# Patient Record
Sex: FEMALE | Race: BLACK OR AFRICAN AMERICAN | NOT HISPANIC OR LATINO | Employment: OTHER | URBAN - METROPOLITAN AREA
[De-identification: names, ages, dates, MRNs, and addresses within clinical notes are randomized per-mention and may not be internally consistent; named-entity substitution may affect disease eponyms.]

---

## 2017-02-07 ENCOUNTER — APPOINTMENT (EMERGENCY)
Dept: RADIOLOGY | Facility: HOSPITAL | Age: 22
End: 2017-02-07
Payer: COMMERCIAL

## 2017-02-07 ENCOUNTER — HOSPITAL ENCOUNTER (EMERGENCY)
Facility: HOSPITAL | Age: 22
Discharge: HOME | End: 2017-02-07
Admitting: EMERGENCY MEDICINE
Payer: COMMERCIAL

## 2017-02-07 VITALS
OXYGEN SATURATION: 98 % | BODY MASS INDEX: 24.11 KG/M2 | HEIGHT: 66 IN | HEART RATE: 96 BPM | WEIGHT: 150 LBS | SYSTOLIC BLOOD PRESSURE: 120 MMHG | RESPIRATION RATE: 18 BRPM | DIASTOLIC BLOOD PRESSURE: 64 MMHG | TEMPERATURE: 98 F

## 2017-02-07 DIAGNOSIS — S63.612A SPRAIN OF RIGHT MIDDLE FINGER, INITIAL ENCOUNTER: ICD-10-CM

## 2017-02-07 DIAGNOSIS — S09.90XA MINOR HEAD INJURY WITHOUT LOSS OF CONSCIOUSNESS, INITIAL ENCOUNTER: ICD-10-CM

## 2017-02-07 DIAGNOSIS — S00.93XA CONTUSION OF HEAD: Primary | ICD-10-CM

## 2017-02-07 LAB — HCG UR QL: NEGATIVE

## 2017-02-07 PROCEDURE — 70486 CT MAXILLOFACIAL W/O DYE: CPT

## 2017-02-07 PROCEDURE — 70450 CT HEAD/BRAIN W/O DYE: CPT

## 2017-02-07 PROCEDURE — 81025 URINE PREGNANCY TEST: CPT | Performed by: PHYSICIAN ASSISTANT

## 2017-02-07 PROCEDURE — 73140 X-RAY EXAM OF FINGER(S): CPT

## 2017-02-07 PROCEDURE — 99284 EMERGENCY DEPT VISIT MOD MDM: CPT

## 2017-02-07 RX ORDER — ACETAMINOPHEN 325 MG/1
325 TABLET ORAL EVERY 6 HOURS PRN
Qty: 30 TABLET | Refills: 0 | Status: SHIPPED | OUTPATIENT
Start: 2017-02-07 | End: 2017-03-09

## 2018-08-10 ENCOUNTER — OFFICE VISIT (OUTPATIENT)
Dept: FAMILY MEDICINE CLINIC | Facility: CLINIC | Age: 23
End: 2018-08-10
Payer: COMMERCIAL

## 2018-08-10 ENCOUNTER — APPOINTMENT (OUTPATIENT)
Dept: LAB | Facility: HOSPITAL | Age: 23
End: 2018-08-10
Payer: COMMERCIAL

## 2018-08-10 ENCOUNTER — TRANSCRIBE ORDERS (OUTPATIENT)
Dept: ADMINISTRATIVE | Facility: HOSPITAL | Age: 23
End: 2018-08-10

## 2018-08-10 VITALS
WEIGHT: 170 LBS | BODY MASS INDEX: 27.32 KG/M2 | HEART RATE: 70 BPM | SYSTOLIC BLOOD PRESSURE: 110 MMHG | RESPIRATION RATE: 16 BRPM | HEIGHT: 66 IN | TEMPERATURE: 98 F | DIASTOLIC BLOOD PRESSURE: 68 MMHG | OXYGEN SATURATION: 99 %

## 2018-08-10 DIAGNOSIS — R55 SYNCOPE AND COLLAPSE: ICD-10-CM

## 2018-08-10 DIAGNOSIS — Z09 ENCOUNTER FOR EXAMINATION FOLLOWING TREATMENT AT HOSPITAL: Primary | ICD-10-CM

## 2018-08-10 LAB
ALBUMIN SERPL BCP-MCNC: 4.4 G/DL (ref 3–5.2)
ALP SERPL-CCNC: 48 U/L (ref 43–122)
ALT SERPL W P-5'-P-CCNC: 20 U/L (ref 9–52)
ANION GAP SERPL CALCULATED.3IONS-SCNC: 11 MMOL/L (ref 5–14)
AST SERPL W P-5'-P-CCNC: 22 U/L (ref 14–36)
BASOPHILS # BLD AUTO: 0 THOUSANDS/ΜL (ref 0–0.1)
BASOPHILS NFR BLD AUTO: 0 % (ref 0–1)
BILIRUB SERPL-MCNC: 0.4 MG/DL
BILIRUB UR QL STRIP: NEGATIVE
BUN SERPL-MCNC: 12 MG/DL (ref 5–25)
CALCIUM SERPL-MCNC: 9.8 MG/DL (ref 8.4–10.2)
CHLORIDE SERPL-SCNC: 104 MMOL/L (ref 97–108)
CLARITY UR: CLEAR
CO2 SERPL-SCNC: 25 MMOL/L (ref 22–30)
COLOR UR: YELLOW
CREAT SERPL-MCNC: 0.76 MG/DL (ref 0.6–1.2)
EOSINOPHIL # BLD AUTO: 0.1 THOUSAND/ΜL (ref 0–0.4)
EOSINOPHIL NFR BLD AUTO: 1 % (ref 0–6)
ERYTHROCYTE [DISTWIDTH] IN BLOOD BY AUTOMATED COUNT: 13.4 %
GFR SERPL CREATININE-BSD FRML MDRD: 126 ML/MIN/1.73SQ M
GLUCOSE P FAST SERPL-MCNC: 78 MG/DL (ref 70–99)
GLUCOSE UR STRIP-MCNC: NEGATIVE MG/DL
HCT VFR BLD AUTO: 41.8 % (ref 36–46)
HGB BLD-MCNC: 13.8 G/DL (ref 12–16)
HGB UR QL STRIP.AUTO: NEGATIVE
KETONES UR STRIP-MCNC: NEGATIVE MG/DL
LEUKOCYTE ESTERASE UR QL STRIP: NEGATIVE
LYMPHOCYTES # BLD AUTO: 1.9 THOUSANDS/ΜL (ref 0.5–4)
LYMPHOCYTES NFR BLD AUTO: 37 % (ref 20–50)
MAGNESIUM SERPL-MCNC: 2.2 MG/DL (ref 1.6–2.3)
MCH RBC QN AUTO: 31 PG (ref 26–34)
MCHC RBC AUTO-ENTMCNC: 33 G/DL (ref 31–36)
MCV RBC AUTO: 94 FL (ref 80–100)
MONOCYTES # BLD AUTO: 0.4 THOUSAND/ΜL (ref 0.2–0.9)
MONOCYTES NFR BLD AUTO: 8 % (ref 1–10)
NEUTROPHILS # BLD AUTO: 2.7 THOUSANDS/ΜL (ref 1.8–7.8)
NEUTS SEG NFR BLD AUTO: 53 % (ref 45–65)
NITRITE UR QL STRIP: NEGATIVE
PH UR STRIP.AUTO: 8 [PH] (ref 4.5–8)
PLATELET # BLD AUTO: 221 THOUSANDS/UL (ref 150–450)
PMV BLD AUTO: 10.2 FL (ref 8.9–12.7)
POTASSIUM SERPL-SCNC: 4 MMOL/L (ref 3.6–5)
PROT SERPL-MCNC: 8.7 G/DL (ref 5.9–8.4)
PROT UR STRIP-MCNC: NEGATIVE MG/DL
RBC # BLD AUTO: 4.45 MILLION/UL (ref 4–5.2)
SL AMB  POCT GLUCOSE, UA: NORMAL
SL AMB LEUKOCYTE ESTERASE,UA: NEGATIVE
SL AMB POCT BILIRUBIN,UA: NEGATIVE
SL AMB POCT BLOOD,UA: NORMAL
SL AMB POCT CLARITY,UA: CLEAR
SL AMB POCT COLOR,UA: YELLOW
SL AMB POCT KETONES,UA: NEGATIVE
SL AMB POCT NITRITE,UA: NEGATIVE
SL AMB POCT PH,UA: 8
SL AMB POCT SPECIFIC GRAVITY,UA: 1
SL AMB POCT URINE PROTEIN: NEGATIVE
SL AMB POCT UROBILINOGEN: NORMAL
SODIUM SERPL-SCNC: 140 MMOL/L (ref 137–147)
SP GR UR STRIP.AUTO: 1.02 (ref 1–1.03)
UROBILINOGEN UR QL STRIP.AUTO: 1 E.U./DL
WBC # BLD AUTO: 5.1 THOUSAND/UL (ref 4.5–11)

## 2018-08-10 PROCEDURE — 3725F SCREEN DEPRESSION PERFORMED: CPT | Performed by: FAMILY MEDICINE

## 2018-08-10 PROCEDURE — 80053 COMPREHEN METABOLIC PANEL: CPT

## 2018-08-10 PROCEDURE — 36415 COLL VENOUS BLD VENIPUNCTURE: CPT

## 2018-08-10 PROCEDURE — 99204 OFFICE O/P NEW MOD 45 MIN: CPT | Performed by: FAMILY MEDICINE

## 2018-08-10 PROCEDURE — 83735 ASSAY OF MAGNESIUM: CPT

## 2018-08-10 PROCEDURE — 93000 ELECTROCARDIOGRAM COMPLETE: CPT | Performed by: FAMILY MEDICINE

## 2018-08-10 PROCEDURE — 81002 URINALYSIS NONAUTO W/O SCOPE: CPT | Performed by: FAMILY MEDICINE

## 2018-08-10 PROCEDURE — 85025 COMPLETE CBC W/AUTO DIFF WBC: CPT

## 2018-08-10 PROCEDURE — 81003 URINALYSIS AUTO W/O SCOPE: CPT | Performed by: STUDENT IN AN ORGANIZED HEALTH CARE EDUCATION/TRAINING PROGRAM

## 2018-08-10 RX ORDER — DESOGESTREL AND ETHINYL ESTRADIOL 0.15-0.03
KIT ORAL EVERY 24 HOURS
COMMUNITY
End: 2019-06-20

## 2018-08-10 NOTE — PATIENT INSTRUCTIONS
You are not allowed to drive at this risk few others  The DMV will be contacted  No using heavy machinery  Syncope   AMBULATORY CARE:   Syncope  is also called fainting or passing out  Syncope is a sudden, temporary loss of consciousness, followed by a fall from a standing or sitting position  Syncope ranges from not serious to a sign of a more serious condition that needs to be treated  You can control some health conditions that cause syncope  Your healthcare providers can help you create a plan to manage syncope and prevent episodes  Signs and symptoms that may occur before syncope include the following:   · Cold, clammy, and sweaty skin     · Fast breathing and a racing, pounding heartbeat     · Feeling more tired than usual     · Nausea, a warm feeling, and sweating    · A headache, or feeling lightheaded or dizzy    · Tingling sensation or numbness     · Spots in front of your eyes, blurred vision, or double vision  Seek care immediately if:   · You are bleeding because you hit your head when you fainted  · You suddenly have double vision, difficulty speaking, numbness, and cannot move your arms or legs  · You have chest pain and trouble breathing  · You vomit blood or material that looks like coffee grounds  · You see blood in your bowel movement  Contact your healthcare provider if:   · You have new or worsening symptoms  · You have another syncope episode  · You have a headache, fast heartbeat, or feel too dizzy to stand up  · You have questions or concerns about your condition or care  Treatment for syncope  depends on the cause of your syncope  To prevent syncope from happening again, you may  need any of the following:  · Medicines  may be needed to treat any medical conditions that are causing your syncope  These may include medicines to help your heart pump strongly and regularly  Your healthcare provider may also make changes to any medicines that are causing syncope  · Tilt training  involves training yourself to stand for 10 to 30 minutes each day against a wall  This helps your body decrease the effects of posture changes and reduces the number of fainting spells  Manage syncope:   · Keep a record of your syncope episodes  Include your symptoms and your activity before and after the episode  The record can help your healthcare provider find the cause of your syncope and help you manage episodes  · Sit or lie down when needed  This includes when you feel dizzy, your throat is getting tight, and your vision changes  Raise your legs above the level of your heart  · Take slow, deep breaths if you start to breathe faster with anxiety or fear  This can help decrease dizziness and the feeling that you might faint  · Check your blood pressure often  This is important if you take medicine to lower your blood pressure  Check your blood pressure when you are lying down and when you are standing  Ask how often to check during the day  Keep a record of your blood pressure numbers  Your healthcare provider may use the record to help plan your treatment  Prevent a syncope episode:   · Move slowly and let yourself get used to one position before you move to another position  This is very important when you change from a lying or sitting position to a standing position  Take some deep breaths before you stand up from a lying position  Stand up slowly  Sudden movements may cause a fainting spell  Sit on the side of the bed or couch for a few minutes before you stand up  If you are on bedrest, try to be upright for about 2 hours each day, or as directed  Do not lock your legs if you are standing for a long period of time  Move your legs and bend your knees to keep blood flowing  · Follow your healthcare provider's recommendations  Your provider may  recommend that you drink more liquids to prevent dehydration   You may also need to have more salt to keep your blood pressure from dropping too low and causing syncope  Your provider will tell you how much liquid and sodium to have each day  · Watch for signs of low blood sugar  These include hunger, nervousness, sweating, and fast or fluttery heartbeats  Talk with your healthcare provider about ways to keep your blood sugar level steady  · Do not strain if you are constipated  You may faint if you strain to have a bowel movement  Walking is the best way to get your bowels moving  Eat foods high in fiber to make it easier to have a bowel movement  Good examples are high-fiber cereals, beans, vegetables, and whole-grain breads  Prune juice may help make bowel movements softer  · Be careful in hot weather  Heat can cause a syncope episode  Limit activity done outside on hot days  Physical activity in hot weather can lead to dehydration  This can cause an episode  Follow up with your healthcare provider as directed:  Write down your questions so you remember to ask them during your visits  © 2017 [facility] Information is for End User's use only and may not be sold, redistributed or otherwise used for commercial purposes  All illustrations and images included in CareNotes® are the copyrighted property of [employer]  or Wilbur [employer]  The above information is an  only  It is not intended as medical advice for individual conditions or treatments  Talk to your doctor, nurse or pharmacist before following any medical regimen to see if it is safe and effective for you

## 2018-08-10 NOTE — PROGRESS NOTES
Assessment/Plan:    Syncope and collapse  Recurrent syncope  Chronic, filled to be worked up in the past   Unknown etiology at this time  Sudden onset, no pre or post prodrome of symptoms  No focal neurological deficit  On physical exam no murmurs appreciated or carotid bruits  Less likely vasovagal as no prodromal symptoms  Could be cardiac or neurologic neurologic in origin  EKG shows normal sinus normal axis with normal intervals there is a nonspecific in at anterior T wave changes could be an incomplete right bundle branch block  Will do echo to evaluate valvular disease  CMP to look at electrolyte  CBC as the patient has history of anemia  Will consider sending patient to Neurology  Discussed with the patient is in significant detail not to operate machinery or drive  Patient realized her understanding and reported she has a good support system and she will be able to get right from family members  Will call DMV to notify  Neurology referral     Encounter for examination following treatment at hospital  Plan as above       Diagnoses and all orders for this visit:    Encounter for examination following treatment at hospital  -     POCT ECG  -     POCT urine dip    Syncope and collapse  -     Echo complete with contrast if indicated; Future  -     CBC and differential; Future  -     Comprehensive metabolic panel; Future  -     Magnesium; Future  -     Ambulatory referral to Neurology; Future    Other orders  -     desogestrel-ethinyl estradiol (APRI) 0 15-30 MG-MCG per tablet; every 24 hours  -     pentosan polysulfate (ELMIRON) 100 mg capsule; 3 (three) times a day  -     HIV 1/2 AG-AB combo; Future          Subjective:      Patient ID: Radhika Terry is a 20 y o  female  Syncope  Patient complains of witnessed loss of consciousness  Onset was 8 years ago  Last episode was August 5, 2018 in [place]  Symptoms have gradually worsened since that time   Patient describes the episode as a sudden loss of consciousness without warning  Associated symptoms: none  The patient denies focal neurologic symptoms  Medications putting patient at risk for syncope: none  No certain or activity positions that are associated with syncope  No pre post syncopal prodrome  No his postictal state  No shaking, losing control bowel or urine, no tongue biting  Patient lost consciousness, and slid down and placed on the floor with someone support,  but did not hit her head  Patient was in [facility] and was in the ER but was not admitted  Patient reports she has never really worked up for syncope although she went to the hospital several times in the past several years  She denied ever having echo done  She was referred to a neurologist for years going [facility] but the day of the appointment she gave birth to a son  Works as customer service over the telephone,  she reports being on phone with customer and but does not remember the conversation, when asked manager to listen back to the records phone call, she is noted to have a normal conversation but she does not remember conversation  The only thing she felt different in the morning when she had a migraine headache of that day  Patient reports having daytime headaches for the past 3 months, increasing frequency, 3 time a week,  or she is unable to do her daily activities  The following portions of the patient's history were reviewed and updated as appropriate: She  has a past medical history of Anemia and Heart murmur  Patient Active Problem List    Diagnosis Date Noted    Syncope and collapse 08/10/2018    Encounter for examination following treatment at hospital 08/10/2018     She  has no past surgical history on file  Her family history is not on file  She  reports that she has never smoked  She has never used smokeless tobacco  She reports that she drinks alcohol  She reports that she does not use drugs    Current Outpatient Prescriptions   Medication Sig Dispense Refill    desogestrel-ethinyl estradiol (APRI) 0 15-30 MG-MCG per tablet every 24 hours      pentosan polysulfate (ELMIRON) 100 mg capsule 3 (three) times a day       No current facility-administered medications for this visit  No current outpatient prescriptions on file prior to visit  No current facility-administered medications on file prior to visit  She is allergic to eggs or egg-derived products; peanut oil; baking soda-fluoride [sodium fluoride]; benzocaine; and lidocaine       Review of Systems   Constitutional: Negative for chills, diaphoresis, fatigue and fever  HENT: Negative for ear pain, facial swelling, hearing loss, mouth sores, rhinorrhea, sinus pain and sinus pressure  Eyes: Negative for photophobia, pain and visual disturbance  Respiratory: Negative for cough, choking and chest tightness  Cardiovascular: Negative for chest pain and leg swelling  Gastrointestinal: Negative for blood in stool, constipation, diarrhea and nausea  Endocrine: Negative for cold intolerance and heat intolerance  Genitourinary: Negative for difficulty urinating, dysuria, enuresis and frequency  Neurological: Positive for headaches  Negative for dizziness, seizures, weakness and light-headedness  Psychiatric/Behavioral: Negative for agitation and confusion  Objective:      /68 (BP Location: Left arm, Patient Position: Sitting, Cuff Size: Adult)   Pulse 70   Temp 98 °F (36 7 °C) (Tympanic)   Resp 16   Ht 5' 6" (1 676 m)   Wt 77 1 kg (170 lb)   LMP  (Within Weeks) Comment: around 7/7/18  SpO2 99%   Breastfeeding? No   BMI 27 44 kg/m²          Physical Exam   Constitutional: She is oriented to person, place, and time  She appears well-developed and well-nourished  No distress  HENT:   Head: Normocephalic and atraumatic  Mouth/Throat: No oropharyngeal exudate     Eyes: Conjunctivae are normal  Pupils are equal, round, and reactive to light  Right eye exhibits no discharge  Left eye exhibits no discharge  No scleral icterus  Neck: Normal range of motion  No JVD present  No tracheal deviation present  No thyromegaly present  Cardiovascular: Normal rate, regular rhythm, normal heart sounds and intact distal pulses  Exam reveals no gallop and no friction rub  No murmur heard  Pulmonary/Chest: Effort normal  No stridor  No respiratory distress  She has no wheezes  She exhibits no tenderness  Abdominal: She exhibits no distension  There is no tenderness  There is no rebound and no guarding  Musculoskeletal: Normal range of motion  She exhibits no edema, tenderness or deformity  Neurological: She is alert and oriented to person, place, and time  She has normal reflexes  She displays no atrophy, no tremor and normal reflexes  No cranial nerve deficit or sensory deficit  She exhibits normal muscle tone  She displays no seizure activity  Coordination and gait normal  GCS eye subscore is 4  GCS verbal subscore is 5  GCS motor subscore is 6  She displays no Babinski's sign on the right side  She displays no Babinski's sign on the left side  Skin: Skin is warm  No rash noted  She is not diaphoretic  No erythema  No pallor  Psychiatric: She has a normal mood and affect   Her behavior is normal  Judgment and thought content normal

## 2018-08-10 NOTE — ASSESSMENT & PLAN NOTE
Recurrent syncope  Chronic, filled to be worked up in the past   Unknown etiology at this time  Sudden onset, no pre or post prodrome of symptoms  No focal neurological deficit  On physical exam no murmurs appreciated or carotid bruits  Less likely vasovagal as no prodromal symptoms  Could be cardiac or neurologic neurologic in origin  EKG shows normal sinus normal axis with normal intervals there is a nonspecific in at anterior T wave changes could be an incomplete right bundle branch block  Will do echo to evaluate valvular disease  CMP to look at electrolyte  CBC as the patient has history of anemia  Will consider sending patient to Neurology  Discussed with the patient is in significant detail not to operate machinery or drive  Patient realized her understanding and reported she has a good support system and she will be able to get right from family members  Will call DMV to notify     Neurology referral

## 2018-08-11 ENCOUNTER — TELEPHONE (OUTPATIENT)
Dept: FAMILY MEDICINE CLINIC | Facility: CLINIC | Age: 23
End: 2018-08-11

## 2018-08-11 NOTE — TELEPHONE ENCOUNTER
Called patient and discussed with her normal lab values  Patient received a phone call from the referring service and is waiting for an appointment for Neurology and Cardiology should her back by Monday  Patient has not gone echo done as she has not called I encouraged her to call office and get an appointment for echocardiogram if she does not hear from them by Monday  Patient otherwise feels well no current complaints at this time  Discussed again the plan and what to expect

## 2018-08-14 ENCOUNTER — HOSPITAL ENCOUNTER (EMERGENCY)
Facility: HOSPITAL | Age: 23
Discharge: HOME | End: 2018-08-14
Attending: EMERGENCY MEDICINE | Admitting: EMERGENCY MEDICINE
Payer: COMMERCIAL

## 2018-08-14 VITALS
OXYGEN SATURATION: 100 % | DIASTOLIC BLOOD PRESSURE: 58 MMHG | WEIGHT: 170 LBS | RESPIRATION RATE: 18 BRPM | BODY MASS INDEX: 27.44 KG/M2 | HEART RATE: 86 BPM | SYSTOLIC BLOOD PRESSURE: 113 MMHG | TEMPERATURE: 98.2 F

## 2018-08-14 DIAGNOSIS — L02.91 ABSCESS: Primary | ICD-10-CM

## 2018-08-14 PROCEDURE — 99283 EMERGENCY DEPT VISIT LOW MDM: CPT

## 2018-08-14 RX ORDER — SULFAMETHOXAZOLE AND TRIMETHOPRIM 800; 160 MG/1; MG/1
1 TABLET ORAL ONCE
Status: COMPLETED | OUTPATIENT
Start: 2018-08-14 | End: 2018-08-14

## 2018-08-14 RX ORDER — LORAZEPAM 0.5 MG/1
0.5 TABLET ORAL ONCE
Status: COMPLETED | OUTPATIENT
Start: 2018-08-14 | End: 2018-08-14

## 2018-08-14 RX ORDER — CEPHALEXIN 250 MG/1
500 CAPSULE ORAL ONCE
Status: COMPLETED | OUTPATIENT
Start: 2018-08-14 | End: 2018-08-14

## 2018-08-14 RX ORDER — OXYCODONE HYDROCHLORIDE AND ACETAMINOPHEN 5; 325 MG/1; MG/1
1 TABLET ORAL EVERY 4 HOURS PRN
Qty: 3 TABLET | Refills: 0 | Status: SHIPPED | OUTPATIENT
Start: 2018-08-14 | End: 2018-08-24

## 2018-08-14 RX ORDER — OXYCODONE HYDROCHLORIDE AND ACETAMINOPHEN 5; 325 MG/1; MG/1
1 TABLET ORAL ONCE
Status: COMPLETED | OUTPATIENT
Start: 2018-08-14 | End: 2018-08-14

## 2018-08-14 RX ORDER — SULFAMETHOXAZOLE AND TRIMETHOPRIM 800; 160 MG/1; MG/1
1 TABLET ORAL 2 TIMES DAILY
Qty: 14 TABLET | Refills: 0 | Status: SHIPPED | OUTPATIENT
Start: 2018-08-14 | End: 2018-08-21

## 2018-08-14 RX ORDER — CEPHALEXIN 250 MG/1
500 CAPSULE ORAL EVERY 8 HOURS SCHEDULED
Qty: 42 CAPSULE | Refills: 0 | Status: SHIPPED | OUTPATIENT
Start: 2018-08-14 | End: 2018-08-21

## 2018-08-14 RX ORDER — LIDOCAINE HYDROCHLORIDE AND EPINEPHRINE BITARTRATE 20; .01 MG/ML; MG/ML
10 INJECTION, SOLUTION SUBCUTANEOUS ONCE
Status: COMPLETED | OUTPATIENT
Start: 2018-08-14 | End: 2018-08-14

## 2018-08-14 RX ADMIN — OXYCODONE AND ACETAMINOPHEN 1 TABLET: 5; 325 TABLET ORAL at 20:37

## 2018-08-14 RX ADMIN — CEPHALEXIN 500 MG: 250 CAPSULE ORAL at 21:58

## 2018-08-14 RX ADMIN — LIDOCAINE HYDROCHLORIDE,EPINEPHRINE BITARTRATE 10 ML: 20; .01 INJECTION, SOLUTION INFILTRATION; PERINEURAL at 20:23

## 2018-08-14 RX ADMIN — LORAZEPAM 0.5 MG: 0.5 TABLET ORAL at 20:23

## 2018-08-14 RX ADMIN — SULFAMETHOXAZOLE AND TRIMETHOPRIM 1 TABLET: 800; 160 TABLET ORAL at 21:58

## 2018-08-15 ENCOUNTER — TELEPHONE (OUTPATIENT)
Dept: FAMILY MEDICINE CLINIC | Facility: CLINIC | Age: 23
End: 2018-08-15

## 2018-08-15 DIAGNOSIS — R55 SYNCOPE, UNSPECIFIED SYNCOPE TYPE: Primary | ICD-10-CM

## 2018-08-15 NOTE — ED PROVIDER NOTES
History  Chief Complaint   Patient presents with    Abscess     large abscess on RLQ abdomen for a few days, area opened in parking lot and draining pus at this time, chills at home , no reported fever     23-year-old female with previous medical history of syncope presenting with a right lower quadrant abscess  Patient noted that she had an ingrown hair approximately 2 weeks ago on her right lower quadrant  Patient plucked hair  Patient noticed induration and pain at the site approximately 1 week later  The site has became increasingly tender and enlarged over the course of the last week  slightly before arrival tonight the patient noted that the abscess popped and a small amount of purulent drainage was expressed from the abscess  Patient denies fevers, chills, nausea, vomiting, chest pain, palpitations, dizziness, weakness, numbness, tingling  Patient notes that she has been in contact with another individual in her house has had a abscess similar to this  Abscess   Location:  Torso  Torso abscess location:  Abd RLQ  Size:  3 cm  Abscess quality: draining, induration and painful    Red streaking: no    Progression:  Worsening  Pain details:     Quality:  Sharp  Associated symptoms: no fever, no nausea and no vomiting        Prior to Admission Medications   Prescriptions Last Dose Informant Patient Reported? Taking?   desogestrel-ethinyl estradiol (APRI) 0 15-30 MG-MCG per tablet   Yes No   Sig: every 24 hours   pentosan polysulfate (ELMIRON) 100 mg capsule   Yes No   Sig: 3 (three) times a day      Facility-Administered Medications: None       Past Medical History:   Diagnosis Date    Anemia     Heart murmur        History reviewed  No pertinent surgical history  History reviewed  No pertinent family history  I have reviewed and agree with the history as documented      Social History   Substance Use Topics    Smoking status: Never Smoker    Smokeless tobacco: Never Used    Alcohol use Yes Comment: rarely; about 3x a year        Review of Systems   Constitutional: Negative for chills and fever  HENT: Negative for ear pain, rhinorrhea and sore throat  Eyes: Negative for photophobia and pain  Respiratory: Negative for cough, chest tightness and shortness of breath  Cardiovascular: Negative for chest pain, palpitations and leg swelling  Gastrointestinal: Negative for abdominal pain, blood in stool, constipation, diarrhea, nausea and vomiting  Endocrine: Negative for polyuria  Genitourinary: Negative for dysuria, frequency, hematuria and urgency  Musculoskeletal: Negative for arthralgias  Skin: Negative for rash  Neurological: Negative for dizziness, weakness and numbness  Psychiatric/Behavioral: The patient is not nervous/anxious  All other systems reviewed and are negative  Physical Exam  ED Triage Vitals [08/14/18 1917]   Temperature Pulse Respirations Blood Pressure SpO2   98 2 °F (36 8 °C) 86 18 113/58 100 %      Temp Source Heart Rate Source Patient Position - Orthostatic VS BP Location FiO2 (%)   Tympanic Monitor Sitting Left arm --      Pain Score       9           Orthostatic Vital Signs  Vitals:    08/14/18 1917   BP: 113/58   Pulse: 86   Patient Position - Orthostatic VS: Sitting       Physical Exam   Constitutional: She appears well-developed and well-nourished  No distress  HENT:   Head: Normocephalic and atraumatic  Right Ear: External ear normal    Left Ear: External ear normal    Eyes: Conjunctivae and EOM are normal    Neck: No JVD present  No tracheal deviation present  Cardiovascular: Normal rate, regular rhythm, normal heart sounds and intact distal pulses  No murmur heard  Pulmonary/Chest: Breath sounds normal  No stridor  No respiratory distress  She has no wheezes  She has no rales  Abdominal: Soft  She exhibits no distension and no mass  There is tenderness  There is no rebound and no guarding     Approximately 2 cm wide indurated area with purulent drainage drainage at a punctate site on the right lower quadrant  Genitourinary:   Genitourinary Comments: Deferred   Musculoskeletal: She exhibits no edema, tenderness or deformity  Neurological: She exhibits normal muscle tone  Coordination normal    Skin: Skin is warm and dry  Capillary refill takes less than 2 seconds  No rash noted  She is not diaphoretic  No erythema  No pallor  Psychiatric: She has a normal mood and affect  Her behavior is normal  Judgment and thought content normal              ED Medications  Medications   LORazepam (ATIVAN) tablet 0 5 mg (0 5 mg Oral Given 8/14/18 2023)   lidocaine-epinephrine (XYLOCAINE/EPINEPHRINE) 2 %-1:100,000 injection 10 mL (10 mL Infiltration Given 8/14/18 2023)   oxyCODONE-acetaminophen (PERCOCET) 5-325 mg per tablet 1 tablet (1 tablet Oral Given 8/14/18 2037)   cephalexin (KEFLEX) capsule 500 mg (500 mg Oral Given 8/14/18 2158)   sulfamethoxazole-trimethoprim (BACTRIM DS) 800-160 mg per tablet 1 tablet (1 tablet Oral Given 8/14/18 2158)       Diagnostic Studies  Results Reviewed     None                 No orders to display         Procedures  Incision/Drainage  Date/Time: 8/14/2018 10:00 PM  Performed by: Tej Molina by: Jovan LISA     Consent:     Consent obtained:  Verbal    Consent given by:  Patient    Risks discussed:  Incomplete drainage and pain    Alternatives discussed:  No treatment  Universal protocol:     Procedure explained and questions answered to patient or proxy's satisfaction: yes      Relevant documents present and verified: yes      Test results available and properly labeled: no      Radiology Images displayed and confirmed    If images not available, report reviewed: no      Required blood products, implants, devices, and special equipment available: no      Site/side marked: yes      Immediately prior to procedure a time out was called: yes      Patient identity confirmed:  Verbally with patient  Location:     Type:  Abscess    Size:  3 cm    Location:  Trunk    Trunk location:  Abdomen  Pre-procedure details:     Skin preparation:  Chloraprep  Sedation:     Sedation type: Anxiolysis  Anesthesia (see MAR for exact dosages): Anesthesia method:  Local infiltration    Local anesthetic:  Lidocaine 2% WITH epi  Procedure details:     Complexity:  Simple    Needle aspiration: no      Incision types:  Stab incision    Scalpel blade:  11    Approach:  Open    Incision depth:  Subcutaneous    Wound management:  Probed and deloculated    Drainage:  Purulent    Drainage amount:  Copious    Wound treatment:  Wound left open  Post-procedure details:     Patient tolerance of procedure: Tolerated well, no immediate complications          Phone Consults  ED Phone Contact    ED Course                               MDM  Number of Diagnoses or Management Options  Abscess: new and does not require workup  Diagnosis management comments: Patient with a right lower quadrant abdominal abscess for the last week  Patient has not been febrile  I performed incision and drainage of the right lower quadrant abscess  Abscess was first visualized with ultrasound  The abscess extended approximately 1 cm deep  Incision was done after the area was anesthetized with 2% lidocaine with epinephrine  Significant purulent drainage was expressed from the incised abscess which was then deloculated  The abscess was left open  Patient was told to follow up with primary care provider in 3 days to make sure that the abscess is resolving  Patient will be discharged on 7 days of cephalexin and Bactrim  Patient also given 3 doses of Percocet for the pain in her right lower quadrant  Patient come back to the ED in case of fever         Amount and/or Complexity of Data Reviewed  Decide to obtain previous medical records or to obtain history from someone other than the patient: yes  Review and summarize past medical records: yes  Independent visualization of images, tracings, or specimens: yes    Risk of Complications, Morbidity, and/or Mortality  Presenting problems: low  Diagnostic procedures: low  Management options: low    Patient Progress  Patient progress: stable    CritCare Time    Disposition  Final diagnoses:   Abscess     Time reflects when diagnosis was documented in both MDM as applicable and the Disposition within this note     Time User Action Codes Description Comment    8/14/2018  9:28 PM Betsy Tran Add [L02 91] Abscess       ED Disposition     ED Disposition Condition Comment    Discharge  Maria M Davila discharge to home/self care  Condition at discharge: Good        Follow-up Information     Follow up With Specialties Details Why [address] Aaron Murphy, MD Family Medicine In 3 days  [address] [facility]  [phone]            Discharge Medication List as of 8/14/2018  9:35 PM      START taking these medications    Details   cephalexin (KEFLEX) 250 mg capsule Take 2 capsules (500 mg total) by mouth every 8 (eight) hours for 7 days, Starting Tue 8/14/2018, Until Tue 8/21/2018, Print      sulfamethoxazole-trimethoprim (BACTRIM DS) 800-160 mg per tablet Take 1 tablet by mouth 2 (two) times a day for 7 days smx-tmp DS (BACTRIM) 800-160 mg tabs (1tab q12 D10), Starting Tue 8/14/2018, Until Tue 8/21/2018, Print         CONTINUE these medications which have NOT CHANGED    Details   desogestrel-ethinyl estradiol (APRI) 0 15-30 MG-MCG per tablet every 24 hours, Historical Med      pentosan polysulfate (ELMIRON) 100 mg capsule 3 (three) times a day, Historical Med           No discharge procedures on file  ED Provider  Attending physically available and evaluated Anafshan Davila  MARIAM managed the patient along with the ED Attending      Electronically Signed by         Cherelle Lomas DO  08/14/18 4250

## 2018-08-15 NOTE — ED ATTENDING ATTESTATION
Juan Babb DO, saw and evaluated the patient  I have discussed the patient with the resident/non-physician practitioner and agree with the resident's/non-physician practitioner's findings, Plan of Care, and MDM as documented in the resident's/non-physician practitioner's note, except where noted  All available labs and Radiology studies were reviewed  At this point I agree with the current assessment done in the Emergency Department  I have conducted an independent evaluation of this patient including a focused history and a physical exam     ED Note - Cristian Horne 20 y o  female MRN: [id number]  Unit/Bed#: [facility] Encounter: [id number]    History of Present Illness   HPI  Cristian Horne is a 20 y o  female who presents for evaluation of an abdominal abscess localized to the right lower quadrant  Patient has had pain at site for approximately 1 week and while entering the emergency department tonight, the abscess began to drain purulent fluid  No fever or chills  No other complaints  REVIEW OF SYSTEMS  See HPI for further details  12 systems reviewed and otherwise negative except as noted  Historical Information     PAST MEDICAL HISTORY  Past Medical History:   Diagnosis Date    Anemia     Heart murmur        FAMILY HISTORY  History reviewed  No pertinent family history  SOCIAL HISTORY  Social History     Social History    Marital status: Not Applicable     Spouse name: N/A    Number of children: N/A    Years of education: N/A     Social History Main Topics    Smoking status: Never Smoker    Smokeless tobacco: Never Used    Alcohol use Yes      Comment: rarely; about 3x a year    Drug use: No    Sexual activity: Not Asked     Other Topics Concern    None     Social History Narrative    None       SURGICAL HISTORY  History reviewed  No pertinent surgical history  Meds/Allergies     CURRENT MEDICATIONS  No current facility-administered medications for this encounter  Current Outpatient Prescriptions:     desogestrel-ethinyl estradiol (APRI) 0 15-30 MG-MCG per tablet, every 24 hours, Disp: , Rfl:     pentosan polysulfate (ELMIRON) 100 mg capsule, 3 (three) times a day, Disp: , Rfl:     (Not in a hospital admission)    ALLERGIES  Allergies   Allergen Reactions    Eggs Or Egg-Derived Products Anaphylaxis    Peanut Oil Anaphylaxis    Baking Soda-Fluoride [Sodium Fluoride] Swelling    Benzocaine Swelling     Objective     PHYSICAL EXAM    VITAL SIGNS: Blood pressure 113/58, pulse 86, temperature 98 2 °F (36 8 °C), temperature source Tympanic, resp  rate 18, weight 77 1 kg (170 lb), SpO2 100 %, not currently breastfeeding  Constitutional:  Appears well developed and well nourished, no acute distress, non-toxic appearance   GI:  Large approximately 3 cm indurated tender fluctuance noted to the right lower quadrant with surrounding erythema, Soft, non-distended, normal bowel sounds, no organomegaly, no mass, no rebound, no guarding         ED COURSE and MDM:    Assessment/Plan   Assessment:  Zoë Burnett is a 20 y o  female presents for evaluation of RLQ abscess    Plan:   symptom management, ID, antibiotics, ED or PCP follow-up in 2 days  CRITICAL CARE TIME: 0 minutes      Portions of the record may have been created with voice recognition software  Occasional wrong word or "sound a like" substitutions may have occurred due to the inherent limitations of voice recognition software       ED Provider  Electronically Signed by

## 2018-08-15 NOTE — DISCHARGE INSTRUCTIONS
Follow up with your primary care doctor in three days to insure the abscess is getting smaller  Come back to the ED if you start to have fevers  Abscess   WHAT YOU NEED TO KNOW:   A warm compress may help your abscess drain  Your healthcare provider may make a cut in the abscess so it can drain  You may need surgery to remove an abscess that is on your hands or buttocks  DISCHARGE INSTRUCTIONS:   Return to the emergency department if:   · The area around your abscess becomes very painful, warm, or has red streaks  · You have a fever and chills  · Your heart is beating faster than usual      · You feel faint or confused  Contact your healthcare provider if:   · Your abscess gets bigger or does not get better  · Your abscess returns  · You have questions or concerns about your condition or care  Medicines: You may  need any of the following:  · Antibiotics  help treat a bacterial infection  · Acetaminophen  decreases pain and fever  It is available without a doctor's order  Ask how much to take and how often to take it  Follow directions  Acetaminophen can cause liver damage if not taken correctly  · NSAIDs , such as ibuprofen, help decrease swelling, pain, and fever  This medicine is available with or without a doctor's order  NSAIDs can cause stomach bleeding or kidney problems in certain people  If you take blood thinner medicine, always ask your healthcare provider if NSAIDs are safe for you  Always read the medicine label and follow directions  · Take your medicine as directed  Contact your healthcare provider if you think your medicine is not helping or if you have side effects  Tell him or her if you are allergic to any medicine  Keep a list of the medicines, vitamins, and herbs you take  Include the amounts, and when and why you take them  Bring the list or the pill bottles to follow-up visits  Carry your medicine list with you in case of an emergency    Self-care:   · Apply a warm compress to your abscess  This will help it open and drain  Wet a washcloth in warm, but not hot, water  Apply the compress for 10 minutes  Repeat this 4 times each day  Do not  press on an abscess or try to open it with a needle  You may push the bacteria deeper or into your blood  · Do not share your clothes, towels, or sheets with anyone  This can spread the infection to others  · Wash your hands often  This can help prevent the spread of germs  Use soap and water or an alcohol-based hand rub  Care for your wound after it is drained:   · Care for your wound as directed  If your healthcare provider says it is okay, carefully remove the bandage and gauze packing  You may need to soak the gauze to get it out of your wound  Clean your wound and the area around it as directed  Dry the area and put on new, clean bandages  Change your bandages when they get wet or dirty  · Ask your healthcare provider how to change the gauze in your wound  Keep track of how many pieces of gauze are placed inside the wound  Do not put too much packing in the wound  Do not pack the gauze too tightly in your wound  Follow up with your healthcare provider in 1 to 3 days: You may need to have your packing removed or your bandage changed  Write down your questions so you remember to ask them during your visits  © 2017 [employer] Information is for End User's use only and may not be sold, redistributed or otherwise used for commercial purposes  All illustrations and images included in CareNotes® are the copyrighted property of [employer]  or [employer]  The above information is an  only  It is not intended as medical advice for individual conditions or treatments  Talk to your doctor, nurse or pharmacist before following any medical regimen to see if it is safe and effective for you

## 2018-08-17 ENCOUNTER — OFFICE VISIT (OUTPATIENT)
Dept: FAMILY MEDICINE CLINIC | Facility: CLINIC | Age: 23
End: 2018-08-17
Payer: COMMERCIAL

## 2018-08-17 VITALS
WEIGHT: 169 LBS | SYSTOLIC BLOOD PRESSURE: 118 MMHG | OXYGEN SATURATION: 99 % | RESPIRATION RATE: 16 BRPM | HEIGHT: 66 IN | HEART RATE: 75 BPM | TEMPERATURE: 97.2 F | BODY MASS INDEX: 27.16 KG/M2 | DIASTOLIC BLOOD PRESSURE: 70 MMHG

## 2018-08-17 DIAGNOSIS — L02.211 ABDOMINAL WALL ABSCESS: Primary | ICD-10-CM

## 2018-08-17 PROBLEM — R53.83 FATIGUE: Status: ACTIVE | Noted: 2018-02-27

## 2018-08-17 PROBLEM — D50.9 IRON DEFICIENCY ANEMIA: Status: ACTIVE | Noted: 2018-02-27

## 2018-08-17 PROCEDURE — 99213 OFFICE O/P EST LOW 20 MIN: CPT | Performed by: FAMILY MEDICINE

## 2018-08-17 PROCEDURE — 87070 CULTURE OTHR SPECIMN AEROBIC: CPT | Performed by: FAMILY MEDICINE

## 2018-08-17 PROCEDURE — 87147 CULTURE TYPE IMMUNOLOGIC: CPT | Performed by: FAMILY MEDICINE

## 2018-08-17 PROCEDURE — 87186 SC STD MICRODIL/AGAR DIL: CPT | Performed by: FAMILY MEDICINE

## 2018-08-17 PROCEDURE — 87205 SMEAR GRAM STAIN: CPT | Performed by: FAMILY MEDICINE

## 2018-08-17 RX ORDER — IBUPROFEN 600 MG/1
600 TABLET ORAL EVERY 6 HOURS PRN
Qty: 60 TABLET | Refills: 0 | Status: SHIPPED | OUTPATIENT
Start: 2018-08-17 | End: 2018-10-18

## 2018-08-17 NOTE — ASSESSMENT & PLAN NOTE
Status post I and D at the emergency department about to be days ago   Patient is on Keflex and Bactrim course for 7 days, advised the patient to continue the course until completed   Will start the patient on Motrin 600 mg Q 6 hours p r n   For pain and inflammation  Will sent cultures of the area  Advised the patient to keep the area clean and change dressing as needed based on the amount of discharge  Provided the patient with 4x4s  Keep the appointment for next week with PCP

## 2018-08-17 NOTE — PROGRESS NOTES
Assessment/Plan:    Abdominal wall abscess  Status post I and D at the emergency department about to be days ago   Patient is on Keflex and Bactrim course for 7 days, advised the patient to continue the course until completed   Will start the patient on Motrin 600 mg Q 6 hours p r n  For pain and inflammation  Will sent cultures of the area  Advised the patient to keep the area clean and change dressing as needed based on the amount of discharge  Provided the patient with 4x4s  Keep the appointment for next week with PCP     Diagnoses and all orders for this visit:    Abdominal wall abscess  -     ibuprofen (MOTRIN) 600 mg tablet; Take 1 tablet (600 mg total) by mouth every 6 (six) hours as needed for mild pain  -     Culture, Aerobic and Anaerobic w/Gram Stain        Subjective:     Liz Conway is a 20 y o  female who is otherwise healthy who presented to the office today for an emergency department follow-up after a small abscess I&D  HPI   patient mentioned that about 4-5 days ago she started noticing a small bump on the right lower quadrant of her abdomen  Has gotten progressively worse which prompted her visit to the emergency department  At the emergency department there was an I and D that was performed to drain the area  No cultures were sent but the patient was started on Keflex 500 mg q 8 hours for 7 days and Bactrim DS b i d  For 7 days as well  Patient has been taking her antibiotics since then  She has not noticed any fever, increasing  Of size of the area or worsening of discharge  She did report that her discharge and swelling has significantly improved since emergency department visit  Review of Systems   Constitutional: Negative for appetite change, chills, diaphoresis, fatigue and fever  Cardiovascular: Negative for chest pain  Gastrointestinal: Positive for abdominal pain  Negative for abdominal distention, constipation, nausea and vomiting          Abdominal wall pain   Skin: Positive for color change, rash and wound  Negative for pallor  Objective:    /70 (BP Location: Left arm, Patient Position: Sitting, Cuff Size: Adult)   Pulse 75   Temp (!) 97 2 °F (36 2 °C) (Temporal)   Resp 16   Ht 5' 6" (1 676 m)   Wt 76 7 kg (169 lb)   SpO2 99%   BMI 27 28 kg/m²      Physical Exam   Constitutional: She is oriented to person, place, and time  She appears well-developed and well-nourished  No distress  HENT:   Head: Normocephalic and atraumatic  Eyes: Conjunctivae are normal  Pupils are equal, round, and reactive to light  Neck: Normal range of motion  Neck supple  Cardiovascular: Normal rate, regular rhythm and normal heart sounds  Exam reveals no gallop and no friction rub  No murmur heard  Pulmonary/Chest: Effort normal and breath sounds normal  No respiratory distress  She has no wheezes  She has no rales  Abdominal: Soft  Bowel sounds are normal  She exhibits no distension  There is tenderness  There is no rebound  Abdominal wall tenderness on the in the right lower quadrant above the abdominal wall abscess area  A small incision measuring about 1 cm in diameter, no noted discharge or surrounding erythema but tender to touch  A small nodular area measuring about 2 cm right below the incisional site the seems to be nonfluctuant   Musculoskeletal: Normal range of motion  Neurological: She is alert and oriented to person, place, and time  Skin: Skin is warm and dry  She is not diaphoretic         Delmy Castellon MD  08/17/18  9:22 AM

## 2018-08-21 LAB
BACTERIA WND AEROBE CULT: ABNORMAL
GRAM STN SPEC: ABNORMAL

## 2018-08-24 ENCOUNTER — OFFICE VISIT (OUTPATIENT)
Dept: FAMILY MEDICINE CLINIC | Facility: CLINIC | Age: 23
End: 2018-08-24
Payer: COMMERCIAL

## 2018-08-24 VITALS
TEMPERATURE: 97.3 F | HEART RATE: 90 BPM | HEIGHT: 66 IN | RESPIRATION RATE: 16 BRPM | SYSTOLIC BLOOD PRESSURE: 118 MMHG | WEIGHT: 170 LBS | OXYGEN SATURATION: 97 % | BODY MASS INDEX: 27.32 KG/M2 | DIASTOLIC BLOOD PRESSURE: 66 MMHG

## 2018-08-24 DIAGNOSIS — R55 SYNCOPE AND COLLAPSE: ICD-10-CM

## 2018-08-24 DIAGNOSIS — Z09 FOLLOW UP: Primary | ICD-10-CM

## 2018-08-24 DIAGNOSIS — L02.211 ABDOMINAL WALL ABSCESS: ICD-10-CM

## 2018-08-24 DIAGNOSIS — Z09 ENCOUNTER FOR EXAMINATION FOLLOWING TREATMENT AT HOSPITAL: ICD-10-CM

## 2018-08-24 PROBLEM — E66.3 OVERWEIGHT (BMI 25.0-29.9): Status: ACTIVE | Noted: 2018-08-24

## 2018-08-24 PROCEDURE — 99214 OFFICE O/P EST MOD 30 MIN: CPT | Performed by: FAMILY MEDICINE

## 2018-08-24 NOTE — PATIENT INSTRUCTIONS
Seizures   AMBULATORY CARE:   A nonepileptic seizure (LISANDRO)  is a short period of changes in how you move, think, or feel  It is sometimes called a nonepileptic event or episode  A LISANDRO looks like an epileptic seizure, but there are no electrical changes in the brain  Epilepsy medicine will not stop or prevent a LISANDRO  A LISANDRO is a serious condition  Early diagnosis and treatment are needed to prevent more problems  Common signs and symptoms include the following:   · Twitching in your arms or legs that lasts more than 2 minutes    · Crying, screaming, or weeping    · Head, neck, and spine bent backwards    · Side to side head movements    · Strong or powerful pushing of the hips    · Thrashing or violent movements, such as striking at walls or breaking pieces of furniture    · Tongue biting  Call 911 for any of the following:   · You have chest pain, tightness, or pressure that may spread to your shoulders, arms, jaw, neck, or back  · You are having breathing problems and your lips, fingernails, or face turn blue  · You had a seizure that continued for more than 5 minutes  Seek care immediately if:   · You feel like fainting or are lightheaded or too dizzy to stand up  · You were injured during or after a seizure  · You think about hurting or killing yourself or someone else  Contact your healthcare provider if:   · You are depressed and feel you cannot cope with your illness  · You are confused or cannot think clearly  · You have new symptoms that you did not have at your last healthcare provider visit  · You have questions or concerns about your condition or care  Treatment  will depend on the cause of your symptoms:  · Treatment  may be needed for physical causes of LISANDRO  For example, medicines may be given for blood sugar or blood pressure problems  Treatment of the cause will prevent a LISANDRO from happening      · Cognitive behavioral therapy  helps you learn to face a feared object or situation slowly and carefully  You will also learn to control your mental and physical reactions of fear  · Psychotherapy  is therapy that includes your family or people who are close to you  You will be able to talk about LISANDRO and anything that may have caused you to develop the condition  · Anxiety medicine  helps keep you calm and relaxed  · Antidepressants  help decrease the symptoms of depression  What you can do to manage LISANDRO:   · Ask what safety precautions you should take  Talk with your healthcare provider about driving  You may not be able to drive until you are seizure-free for a period of time  You will need to check the law where you live  Also talk to your healthcare provider about swimming and bathing  You may drown or develop life-threatening heart or lung damage if you have a seizure in water  · Tell your friends, family members, and coworkers that you have had a seizure  Give them written instructions to follow if you have another seizure  Your healthcare provider can help you create a list that is specific to your signs and symptoms  · Keep a seizure diary  This can help you find your triggers and avoid them  Write down the dates of your seizures, where you were, and what you were doing  Include how you felt before and after  Possible triggers include illness, lack of sleep, hormonal changes, alcohol, drugs, lights, or stress  What you can do to prevent a LISANDRO:  You may not be able to prevent every seizure  The following can help you manage triggers that may make a seizure start:  · Set a regular sleep schedule  Try to go to sleep and wake up at the same times each day  Sleep problems can trigger a LISANDRO  Talk to your healthcare provider if you have trouble sleeping  · Exercise as often as possible  Exercise can relieve stress and help you sleep better  You may feel better with 30 minutes of exercise most days of the week   Your healthcare provider can help you create a safe exercise plan  · Limit or do not drink alcohol  Alcohol can trigger a LISANDRO  Ask your healthcare provider how much alcohol is safe for you to drink  A drink of alcohol is 12 ounces of beer, 1½ ounces of liquor, or 5 ounces of wine  · Do not use illegal drugs  Drugs can trigger a LISANDRO  Talk to your healthcare provider if you use illegal drugs and need help to quit  · Manage stress  Breathe deeply and slowly when you feel stressed or anxious  Relax each part of your body one at a time  Try activities that you find relaxing, such as yoga or a short walk  Music can help you relax  You may also want to join a support group so you can talk with others who have LISANDRO  Talk to someone you trust about your feelings  · Do not smoke  Nicotine and other chemicals in cigarettes and cigars can make stress and anxiety worse  Ask your healthcare provider for information if you currently smoke and need help to quit  E-cigarettes or smokeless tobacco still contain nicotine  Talk to your healthcare provider before you use these products  Follow up with your healthcare provider as directed: Your healthcare provider may refer you to a therapist or psychologist  Write down your questions so you remember to ask them during your visits  © 2017 [employer] Information is for End User's use only and may not be sold, redistributed or otherwise used for commercial purposes  All illustrations and images included in CareNotes® are the copyrighted property of [employer]  or [employer]  The above information is an  only  It is not intended as medical advice for individual conditions or treatments  Talk to your doctor, nurse or pharmacist before following any medical regimen to see if it is safe and effective for you

## 2018-08-24 NOTE — PROGRESS NOTES
Assessment/Plan:    Syncope and collapse  Scheduled for September 13 echo, September 18th Cardiology  Will be seen by Neurology after Cardiology assessment per patient and scheduling Center  Patient is keeping a diary of events when she was having seizure she will continue to do keep a log  Last seizure was August 11, 2018 in [place]  Patient is not currently driving she is not using heavy equipment  She is being driven by her son's father, patient has good support system  Patient agreed not to drive we verbalized her understanding  DMV was contacted, document was filled and faxed over to [facility]  Overweight (BMI 25 0-29  9)  Counseled patient on the importance of working to achieve weight reduction goal   Discussed benefits of weight loss including prevention or control of comorbidities  Discussed role that balanced diet and daily activity play in weight reduction  Set up small attainable weight loss goals  Involve family, friends, and co-workers for support  Encounter for examination following treatment at hospital  For syncope and collapse versus seizure  Plan as above    Abdominal wall abscess  Status post I and D  No sign of infection at this time  Incision clean and intact       Diagnoses and all orders for this visit:    Follow up    Syncope and collapse    Encounter for examination following treatment at hospital    Abdominal wall abscess          Subjective:      Patient ID: Zoë Burnett is a 20 y o  female  This is a 57-year-old female significant history of syncope and collapse patient here for follow-up on her syncope  Last time patient was here he recently has syncopal episode [place] outside in the park when her son found her  Syncopal episode/memory loss with no clear etiology as the patient would perform daily activities speak but not remember certain time periods  No pre post seizure prodrome   Since last seen 3 weeks ago she has not had an episode that she noticed  Patient was referred to Neurology and Cardiology to be evaluated  Patient does suffer from migraines frequently but has not had one in  Sometime  To know patient was recently in the ED after she had lower abdominal wall superficial skin abscess, s/p I and D  no systemic symptoms  Patient denies any fevers chills nausea vomiting diarrhea chest pain palpitations current headaches shortness of breath numbness or weakness or any focal neurological deficit  The following portions of the patient's history were reviewed and updated as appropriate: She  has a past medical history of Anemia and Heart murmur  Patient Active Problem List    Diagnosis Date Noted    Overweight (BMI 25 0-29 9) 08/24/2018    Abdominal wall abscess 08/17/2018    Syncope and collapse 08/10/2018    Encounter for examination following treatment at hospital 08/10/2018    Fatigue 02/27/2018    Iron deficiency anemia 02/27/2018     She  has no past surgical history on file  Her family history is not on file  She  reports that she has never smoked  She has never used smokeless tobacco  She reports that she drinks alcohol  She reports that she does not use drugs  Current Outpatient Prescriptions   Medication Sig Dispense Refill    desogestrel-ethinyl estradiol (APRI) 0 15-30 MG-MCG per tablet every 24 hours      ibuprofen (MOTRIN) 600 mg tablet Take 1 tablet (600 mg total) by mouth every 6 (six) hours as needed for mild pain 60 tablet 0    oxyCODONE-acetaminophen (PERCOCET) 5-325 mg per tablet Take 1 tablet by mouth every 4 (four) hours as needed for moderate pain for up to 10 days Max Daily Amount: 3 tablets 3 tablet 0    pentosan polysulfate (ELMIRON) 100 mg capsule 3 (three) times a day       No current facility-administered medications for this visit        Current Outpatient Prescriptions on File Prior to Visit   Medication Sig    desogestrel-ethinyl estradiol (APRI) 0 15-30 MG-MCG per tablet every 24 hours    ibuprofen (MOTRIN) 600 mg tablet Take 1 tablet (600 mg total) by mouth every 6 (six) hours as needed for mild pain    oxyCODONE-acetaminophen (PERCOCET) 5-325 mg per tablet Take 1 tablet by mouth every 4 (four) hours as needed for moderate pain for up to 10 days Max Daily Amount: 3 tablets    pentosan polysulfate (ELMIRON) 100 mg capsule 3 (three) times a day     No current facility-administered medications on file prior to visit  She is allergic to eggs or egg-derived products; peanut oil; baking soda-fluoride [sodium fluoride]; and benzocaine       Review of Systems   Constitutional: Negative for chills, diaphoresis, fatigue and fever  HENT: Negative for drooling, mouth sores, postnasal drip, rhinorrhea, sinus pain and sore throat  Respiratory: Negative for cough, choking, shortness of breath, wheezing and stridor  Cardiovascular: Negative for chest pain, palpitations and leg swelling  Gastrointestinal: Negative for abdominal distention, abdominal pain, anal bleeding, blood in stool, constipation, diarrhea, nausea and vomiting  Endocrine: Negative for cold intolerance and heat intolerance  Genitourinary: Negative for dysuria, flank pain, frequency, genital sores, hematuria, pelvic pain and urgency  Musculoskeletal: Negative for back pain, gait problem, joint swelling, neck pain and neck stiffness  Skin: Negative for pallor and rash  Neurological: Negative for seizures, light-headedness, numbness and headaches  Psychiatric/Behavioral: Negative for agitation, confusion, hallucinations, sleep disturbance and suicidal ideas           Objective:      /66 (BP Location: Left arm, Patient Position: Sitting, Cuff Size: Adult)   Pulse 90   Temp (!) 97 3 °F (36 3 °C) (Tympanic)   Resp 16   Ht 5' 6" (1 676 m)   Wt 77 1 kg (170 lb)   LMP  (Within Weeks) Comment: sometime in July 2018  SpO2 97%   BMI 27 44 kg/m²          Physical Exam   Constitutional: She is oriented to person, place, and time  She appears well-developed and well-nourished  No distress  HENT:   Head: Normocephalic and atraumatic  Right Ear: External ear normal    Left Ear: External ear normal    Mouth/Throat: No oropharyngeal exudate  Eyes: Conjunctivae and EOM are normal  Pupils are equal, round, and reactive to light  Right eye exhibits no discharge  Left eye exhibits no discharge  No scleral icterus  Neck: Normal range of motion  No JVD present  No tracheal deviation present  No thyromegaly present  Cardiovascular: Normal rate, regular rhythm, normal heart sounds and intact distal pulses  Exam reveals no gallop and no friction rub  No murmur heard  Pulmonary/Chest: Effort normal  No stridor  No respiratory distress  She has no wheezes  She exhibits no tenderness  Abdominal: She exhibits no distension  There is no tenderness  There is no rebound and no guarding  Musculoskeletal: Normal range of motion  She exhibits no edema, tenderness or deformity  Lymphadenopathy:     She has no cervical adenopathy  Neurological: She is alert and oriented to person, place, and time  She displays no atrophy and no tremor  No cranial nerve deficit or sensory deficit  She exhibits normal muscle tone  Coordination and gait normal  GCS eye subscore is 4  GCS verbal subscore is 5  GCS motor subscore is 6  Reflex Scores:       Patellar reflexes are 1+ on the right side and 1+ on the left side  Skin: Skin is warm  Lesion noted  No abrasion, no bruising, no purpura and no rash noted  Rash is not nodular, not pustular and not urticarial  She is not diaphoretic  No erythema  No pallor  Psychiatric: She has a normal mood and affect   Her behavior is normal  Judgment and thought content normal

## 2018-08-24 NOTE — ASSESSMENT & PLAN NOTE
Scheduled for September 13 echo, September 18th Cardiology  Will be seen by Neurology after Cardiology assessment per patient and scheduling Center  Patient is keeping a diary of events when she was having seizure she will continue to do keep a log  Last seizure was August 11, 2018 in [place]  Patient is not currently driving she is not using heavy equipment  She is being driven by her son's father, patient has good support system  Patient agreed not to drive we verbalized her understanding  DMV was contacted, document was filled and faxed over to [facility]

## 2018-09-13 ENCOUNTER — HOSPITAL ENCOUNTER (OUTPATIENT)
Dept: NON INVASIVE DIAGNOSTICS | Facility: CLINIC | Age: 23
Discharge: HOME | End: 2018-09-13
Payer: COMMERCIAL

## 2018-09-13 DIAGNOSIS — R55 SYNCOPE AND COLLAPSE: ICD-10-CM

## 2018-09-13 PROCEDURE — 93306 TTE W/DOPPLER COMPLETE: CPT

## 2018-09-14 PROCEDURE — 93306 TTE W/DOPPLER COMPLETE: CPT | Performed by: INTERNAL MEDICINE

## 2018-09-18 ENCOUNTER — OFFICE VISIT (OUTPATIENT)
Dept: CARDIOLOGY CLINIC | Facility: CLINIC | Age: 23
End: 2018-09-18
Payer: COMMERCIAL

## 2018-09-18 VITALS
DIASTOLIC BLOOD PRESSURE: 62 MMHG | SYSTOLIC BLOOD PRESSURE: 110 MMHG | BODY MASS INDEX: 26.39 KG/M2 | OXYGEN SATURATION: 100 % | WEIGHT: 164.2 LBS | HEART RATE: 75 BPM | HEIGHT: 66 IN

## 2018-09-18 DIAGNOSIS — R55 NEUROCARDIOGENIC SYNCOPE: ICD-10-CM

## 2018-09-18 DIAGNOSIS — R55 SYNCOPE, UNSPECIFIED SYNCOPE TYPE: Primary | ICD-10-CM

## 2018-09-18 PROCEDURE — 93000 ELECTROCARDIOGRAM COMPLETE: CPT | Performed by: INTERNAL MEDICINE

## 2018-09-18 PROCEDURE — 99215 OFFICE O/P EST HI 40 MIN: CPT | Performed by: INTERNAL MEDICINE

## 2018-09-18 NOTE — ASSESSMENT & PLAN NOTE
Ms Larisa Solorzano description of symptoms are consistent with vasovagal syncope  She has no signs and symptoms that are suggestive of high risk syncope  She gives no history concerning for seizure disorder though she does have a family history  Her  Episodes are very infrequent  She has had no symptoms suggestive of migraine with aura though she does get occasional headaches  Her physical examination is unremarkable from cardiac perspective  Her ECG is overall unremarkable except for mild sinus arrhythmia  Recent echocardiogram shows normal left ventricular function and no significant valvular heart disease  At this time I recommend no further workup  We are recommending that she should stay well hydrated at all times and she should avoid prolonged standing without moving  I have also educated her about  Physical counter pressure maneuvers  She can perform if she is feeling any aura  I do not see any indication to restrict her 's license at this time  However if she has any recurrence of this episodes or any other concerning symptoms then we will proceed with further workup

## 2018-09-18 NOTE — PROGRESS NOTES
[address] [address]    [address], [address]   [address]  [address]  Phone#  [phone]  Fax#  [phone]  *-*-*-*-*-*-*-*-*-*-*-*-*-*-*-*-*-*-*-*-*-*-*-*-*-*-*-*-*-*-*-*-*-*-*-*-*-*-*-*-*-*-*-*-*-*-*-*-*-*-*-*-*-*    Donovan Deal DATE: 09/18/18 5:09 PM    PATIENT NAME: Yudith Mackay   1995    [id number]  Age: 20 y o  Sex: female    AUTHOR: Monique Mcneal MD  PRIMARYCARE PHYSICIAN: Lindsey Packer MD    No specialty comments available  CURRENT ECG:  Results for orders placed or performed in visit on 09/18/18   POCT ECG    Narrative    Sinus rhythm with sinus arrhythmia  HR 75 bpm, normal axis and intervals, no significant ST T wave abnormalities  CARDIOLOGY ASSESSMENT & PLAN:  Neurocardiogenic syncope    Ms Alcantara description of symptoms are consistent with vasovagal syncope  She has no signs and symptoms that are suggestive of high risk syncope  She gives no history concerning for seizure disorder though she does have a family history  Her  Episodes are very infrequent  She has had no symptoms suggestive of migraine with aura though she does get occasional headaches  Her physical examination is unremarkable from cardiac perspective  Her ECG is overall unremarkable except for mild sinus arrhythmia  Recent echocardiogram shows normal left ventricular function and no significant valvular heart disease  At this time I recommend no further workup  We are recommending that she should stay well hydrated at all times and she should avoid prolonged standing without moving  I have also educated her about  Physical counter pressure maneuvers  She can perform if she is feeling any aura  I do not see any indication to restrict her 's license at this time  However if she has any recurrence of this episodes or any other concerning symptoms then we will proceed with further workup        HISTORY OF PRESENT ILLNESS:  Yudith Mackay   Has been referred to us for evaluation of syncopal episode which occurred in August 2018  She has no significant past medical history except for rare occurrences of syncopal episodes and recent evaluation in the emergency room for suspected abdominal wall abscess  In addition she is on medication for bladder irritation and in test isolated  she reports that she had a syncopal event in August 2018  Apparently she was outdoors with her  Son who was at a  [employer]  Apparently she felt a hot feeling going through her face and head and lost consciousness  She did not fall down as she was held by her family who took her to emergency room  This was in [facility]  She regained consciousness immediately after the episode and had no significant symptoms  She she denies having experienced any palpitation, vision change, seizure-like activity, nausea or vomiting with the event  She reports that she had a similar event approximately 2 and half years back and another event several years before that  Previous events usually occurred when she was pregnant  All events tend to occur when she has been standing for a long time and she is usually out does  She denies other cardiac specific symptoms such as exertional chest pain, shortness of breath palpitations, orthopnea, PND or pedal edema  She is dizzy and physically very active without any significant symptoms  She has had no recent  Decline in her exercise tolerance  She does report occasional anxiety attacks  her family history significant for her mother having seizure disorder  There is no family history of premature CAD or sudden cardiac death  She has no history of smoking and drinks rarely  She has no history of illicit drug use  She currently works at [employer]  REVIEW OF SYMPTOMS:    Positive for:    Syncopal event in August 2018  Negative for: All remaining as reviewed below and in HPI      SYSTEM SYMPTOMS REVIEWED:  General--weight change, fever, night sweats  Respirato  Cardiovascular--chest pain, syncope, dyspnea on exertion, edema, decline in exercise tolerance, claudication   Gastrointestinal--persistent vomiting, diarrhea, abdominal distention, blood in stool   Muscular or skeletal--joint pain or swelling   Neurologic--headaches, syncope, abnormal movement  Hematologic--history of easy bruising and bleeding   Endocrine--thyroid enlargement, heat or cold intolerance, polyuria   Psychiatric--anxiety, depression     *-*-*-*-*-*-*-*-*-*-*-*-*-*-*-*-*-*-*-*-*-*-*-*-*-*-*-*-*-*-*-*-*-*-*-*-*-*-*-*-*-*-*-*-*-*-*-*-*-*-*-*-*-*-  VITAL SIGNS:  Vitals:    09/18/18 1542   BP: 110/62   BP Location: Left arm   Patient Position: Sitting   Cuff Size: Adult   Pulse: 75   SpO2: 100%   Weight: 74 5 kg (164 lb 3 2 oz)   Height: 5' 6" (1 676 m)       *-*-*-*-*-*-*-*-*-*-*-*-*-*-*-*-*-*-*-*-*-*-*-*-*-*-*-*-*-*-*-*-*-*-*-*-*-*-*-*-*-*-*-*-*-*-*-*-*-*-*-*-*-*-  PHYSICAL EXAM:  General Appearance:    Alert, cooperative, no distress, appears stated age, NORMAL BUILT   Head, Eyes, ENT:    No obvious abnormality, moist mucous mebranes  Neck:   Supple, no carotid bruit or JVD   Back:     Symmetric, no curvature  Lungs:     Respirations unlabored  Clear to auscultation bilaterally,    Chest wall:    No tenderness or deformity   Heart:    Regular rate and rhythm, S1 and S2 normal, no murmur, rub  or gallop  Abdomen:     Soft, non-tender,  Right paraumbilical region has a healing abscess with discoloration of the skin and some thickening without any obvious discharge  Extremities:   Extremities normal, no cyanosis or edema     Skin:   Skin color, texture, turgor normal, no rashes or lesions     *-*-*-*-*-*-*-*-*-*-*-*-*-*-*-*-*-*-*-*-*-*-*-*-*-*-*-*-*-*-*-*-*-*-*-*-*-*-*-*-*-*-*-*-*-*-*-*-*-*-*-*-*-*-  CURRENT MEDICATION LIST:    Current Outpatient Prescriptions:     desogestrel-ethinyl estradiol (APRI) 0 15-30 MG-MCG per tablet, every 24 hours, Disp: , Rfl:     pentosan polysulfate (ELMIRON) 100 mg capsule, 3 (three) times a day, Disp: , Rfl:     ibuprofen (MOTRIN) 600 mg tablet, Take 1 tablet (600 mg total) by mouth every 6 (six) hours as needed for mild pain (Patient not taking: Reported on 9/18/2018 ), Disp: 60 tablet, Rfl: 0    ALLERGIES:  Allergies   Allergen Reactions    Eggs Or Egg-Derived Products Anaphylaxis    Peanut Oil Anaphylaxis    Baking Soda-Fluoride [Sodium Fluoride] Swelling    Benzocaine Swelling       *-*-*-*-*-*-*-*-*-*-*-*-*-*-*-*-*-*-*-*-*-*-*-*-*-*-*-*-*-*-*-*-*-*-*-*-*-*-*-*-*-*-*-*-*-*-*-*-*-*-*-*-*-*-    LABORATORY DATA:  I have personally reviewed pertinent labs  Sodium   Date Value Ref Range Status   08/10/2018 140 137 - 147 mmol/L Final     Potassium   Date Value Ref Range Status   08/10/2018 4 0 3 6 - 5 0 mmol/L Final     Chloride   Date Value Ref Range Status   08/10/2018 104 97 - 108 mmol/L Final     CO2   Date Value Ref Range Status   08/10/2018 25 22 - 30 mmol/L Final     BUN   Date Value Ref Range Status   08/10/2018 12 5 - 25 mg/dL Final     Creatinine   Date Value Ref Range Status   08/10/2018 0 76 0 60 - 1 20 mg/dL Final     Comment:     Standardized to IDMS reference method     eGFR   Date Value Ref Range Status   08/10/2018 126 >60 ml/min/1 73sq m Final     Calcium   Date Value Ref Range Status   08/10/2018 9 8 8 4 - 10 2 mg/dL Final     AST   Date Value Ref Range Status   08/10/2018 22 14 - 36 U/L Final     Comment:       Specimen collection should occur prior to Sulfasalazine administration due to the potential for falsely depressed results  ALT   Date Value Ref Range Status   08/10/2018 20 9 - 52 U/L Final     Comment:       Specimen collection should occur prior to Sulfasalazine administration due to the potential for falsely depressed results        Alkaline Phosphatase   Date Value Ref Range Status   08/10/2018 48 43 - 122 U/L Final     Magnesium   Date Value Ref Range Status   08/10/2018 2 2 1 6 - 2 3 mg/dL Final     WBC Date Value Ref Range Status   08/10/2018 5 10 4 50 - 11 00 Thousand/uL Final     Hemoglobin   Date Value Ref Range Status   08/10/2018 13 8 12 0 - 16 0 g/dL Final     Platelets   Date Value Ref Range Status   08/10/2018 221 150 - 450 Thousands/uL Final     No results found for: PT, PTT, INR  No results found for: CKMB, BNP, DIGOXIN  No results found for: TSH, T4, T3  No results found for: CHOL, HDL, CHOLHDLRAT, LDL, TRIG   No results found for: HGBA1C  Gram Stain Result   Date Value Ref Range Status   2018 No Polys or Bacteria seen  Final     Wound Culture   Date Value Ref Range Status   2018 3 colonies Staphylococcus aureus (A)  Final       *-*-*-*-*-*-*-*-*-*-*-*-*-*-*-*-*-*-*-*-*-*-*-*-*-*-*-*-*-*-*-*-*-*-*-*-*-*-*-*-*-*-*-*-*-*-*-*-*-*-*-*-*-*-  PREVIOUS CARDIOLOGY & RADIOLOGY RESULTS:  Results for orders placed during the hospital encounter of 18   Echo complete with contrast if indicated    [address]  [address] [facility], [address]  [phone]    Transthoracic Echocardiogram  2D, M-mode, Doppler, and Color Doppler    Study date:  13-Sep-2018    Patient: Michelle Banks  MR number: [id number]  Account number: [de-identified]  : 1995  Age: 20 years  Gender: Female  Status: Outpatient  Location: [facility]  Height: 66 in  Weight: 170 lb  BP: 118/ 66 mmHg    Indications: Syncope    Diagnoses: R55  - Syncope and collapse    Sonographer:  KATELYNN Lopez  Primary Physician:  Laura Alejo MD  Referring Physician:  Laura Alejo MD  Group:  [id number]  Interpreting Physician:  Adarsh Middleton MD    SUMMARY    LEFT VENTRICLE:  Systolic function was normal  Ejection fraction was estimated to be 60 %  There were no regional wall motion abnormalities  MITRAL VALVE:  There was trace regurgitation  TRICUSPID VALVE:  There was mild regurgitation      PULMONIC VALVE:  There was mild to moderate regurgitation  HISTORY: PRIOR HISTORY: Seizures    PROCEDURE: The study was performed in the [address] [place] [address]  This was a routine study  The transthoracic approach was used  The study included complete 2D imaging, M-mode, complete spectral Doppler, and color Doppler  The  heart rate was 89 bpm, at the start of the study  Images were obtained from the parasternal, apical, subcostal, and suprasternal notch acoustic windows  Image quality was adequate  LEFT VENTRICLE: Size was normal  Systolic function was normal  Ejection fraction was estimated to be 60 %  There were no regional wall motion abnormalities  Wall thickness was normal  DOPPLER: The transmitral flow pattern was normal     RIGHT VENTRICLE: The size was normal  Systolic function was normal  Wall thickness was normal     LEFT ATRIUM: Size was normal     RIGHT ATRIUM: Size was normal     MITRAL VALVE: Valve structure was normal  There was normal leaflet separation  DOPPLER: The transmitral velocity was within the normal range  There was no evidence for stenosis  There was trace regurgitation  AORTIC VALVE: The valve was trileaflet  Leaflets exhibited normal thickness and normal cuspal separation  DOPPLER: Transaortic velocity was within the normal range  There was no evidence for stenosis  There was no regurgitation  TRICUSPID VALVE: The valve structure was normal  There was normal leaflet separation  DOPPLER: The transtricuspid velocity was within the normal range  There was no evidence for stenosis  There was mild regurgitation  Pulmonary artery  systolic pressure was within the normal range  Estimated peak PA pressure was 25 mmHg  PULMONIC VALVE: Leaflets exhibited normal thickness, no calcification, and normal cuspal separation  DOPPLER: The transpulmonic velocity was within the normal range  There was mild to moderate regurgitation  PERICARDIUM: There was no pericardial effusion   The pericardium was normal in appearance  AORTA: The root exhibited normal size  SYSTEMIC VEINS: IVC: The inferior vena cava was not well visualized  SYSTEM MEASUREMENT TABLES    2D  Ao Diam: 2 7 cm  IVSd: 0 8 cm  LA Diam: 3 1 cm  LVIDd: 5 cm  LVIDs: 3 5 cm  LVPWd: 0 8 cm    CW  TR Vmax: 2 3 m/s  TR maxP 4 mmHg    PW  E': 0 1 m/s  E/E': 9 1  MV A Erick: 0 7 m/s  MV Dec Hemphill: 6 9 m/s2  MV DecT: 179 3 ms  MV E Erick: 1 2 m/s  MV E/A Ratio: 1 8    [facility]    Prepared and electronically signed by    Lorena Mazariegos MD  Signed 14-Sep-2018 10:55:23       No results found for this or any previous visit  No results found for this or any previous visit  No results found for this or any previous visit  No results found  *-*-*-*-*-*-*-*-*-*-*-*-*-*-*-*-*-*-*-*-*-*-*-*-*-*-*-*-*-*-*-*-*-*-*-*-*-*-*-*-*-*-*-*-*-*-*-*-*-*-*-*-*-*-  SIGNATURES:   @BDP@   Monique Mcneal MD     CC:   MD Kyle Mirza MD   *-*-*-*-*-*-*-*-*-*-*-*-*-*-*-*-*-*-*-*-*-*-*-*-*-*-*-*-*-*-*-*-*-*-*-*-*-*-*-*-*-*-*-*-*-*-*-*-*-*-*-*-*-*-    Social History     Social History    Marital status: Not Applicable     Spouse name: N/A    Number of children: N/A    Years of education: N/A     Occupational History    Not on file  Social History Main Topics    Smoking status: Never Smoker    Smokeless tobacco: Never Used    Alcohol use Yes      Comment: rarely; about 3x a year    Drug use: No    Sexual activity: Not on file     Other Topics Concern    Not on file     Social History Narrative    No narrative on file      No family history on file  No past surgical history on file

## 2018-09-18 NOTE — PATIENT INSTRUCTIONS
CARDIOLOGY ASSESSMENT & PLAN:  Neurocardiogenic syncope    Ms Alcantara description of symptoms are consistent with vasovagal syncope  She has no signs and symptoms that are suggestive of high risk syncope  She gives no history concerning for seizure disorder though she does have a family history  Her  Episodes are very infrequent  She has had no symptoms suggestive of migraine with aura though she does get occasional headaches  Her physical examination is unremarkable from cardiac perspective  Her ECG is overall unremarkable except for mild sinus arrhythmia  Recent echocardiogram shows normal left ventricular function and no significant valvular heart disease  At this time I recommend no further workup  We are recommending that she should stay well hydrated at all times and she should avoid prolonged standing without moving  I have also educated her about  Physical counter pressure maneuvers  She can perform if she is feeling any aura  I do not see any indication to restrict her 's license at this time  However if she has any recurrence of this episodes or any other concerning symptoms then we will proceed with further workup

## 2018-09-18 NOTE — LETTER
September 18, 2018     Jeremy Horn MD  [facility]  [address]    Patient: Rina Newman   YOB: 1995   Date of Visit: 9/18/2018       Dear Dr Loreta Robison: Thank you for referring Rina Newman to me for evaluation  Below are my notes for this consultation  If you have questions, please do not hesitate to call me  I look forward to following your patient along with you  Sincerely,        Monique Mcneal MD        CC: No Recipients  Monique Mcneal MD  9/18/2018  5:09 PM  Incomplete   [facility]   [address], [address]   49  [address]  Phone#  [phone]  Fax#  [phone]  *-*-*-*-*-*-*-*-*-*-*-*-*-*-*-*-*-*-*-*-*-*-*-*-*-*-*-*-*-*-*-*-*-*-*-*-*-*-*-*-*-*-*-*-*-*-*-*-*-*-*-*-*-*    Anna Jewell DATE: 09/18/18 5:09 PM    PATIENT NAME: Rina Newman   1995    [id number]  Age: 20 y o  Sex: female    AUTHOR: Monique Mcneal MD  PRIMARYCARE PHYSICIAN: Jeremy Horn MD    No specialty comments available  CURRENT ECG:  Results for orders placed or performed in visit on 09/18/18   POCT ECG    Narrative    Sinus rhythm with sinus arrhythmia  HR 75 bpm, normal axis and intervals, no significant ST T wave abnormalities  CARDIOLOGY ASSESSMENT & PLAN:  Neurocardiogenic syncope    Ms Alcantara description of symptoms are consistent with vasovagal syncope  She has no signs and symptoms that are suggestive of high risk syncope  She gives no history concerning for seizure disorder though she does have a family history  Her  Episodes are very infrequent  She has had no symptoms suggestive of migraine with aura though she does get occasional headaches  Her physical examination is unremarkable from cardiac perspective  Her ECG is overall unremarkable except for mild sinus arrhythmia  Recent echocardiogram shows normal left ventricular function and no significant valvular heart disease        At this time I recommend no further workup  We are recommending that she should stay well hydrated at all times and she should avoid prolonged standing without moving  I have also educated her about  Physical counter pressure maneuvers  She can perform if she is feeling any aura  I do not see any indication to restrict her 's license at this time  However if she has any recurrence of this episodes or any other concerning symptoms then we will proceed with further workup  HISTORY OF PRESENT ILLNESS:  Ramez Chavez   Has been referred to us for evaluation of syncopal episode which occurred in August 2018  She has no significant past medical history except for rare occurrences of syncopal episodes and recent evaluation in the emergency room for suspected abdominal wall abscess  In addition she is on medication for bladder irritation and in test isolated  she reports that she had a syncopal event in August 2018  Apparently she was outdoors with her  Son who was at a  [employer]  Apparently she felt a hot feeling going through her face and head and lost consciousness  She did not fall down as she was held by her family who took her to emergency room  This was in [facility]  She regained consciousness immediately after the episode and had no significant symptoms  She she denies having experienced any palpitation, vision change, seizure-like activity, nausea or vomiting with the event  She reports that she had a similar event approximately 2 and half years back and another event several years before that  Previous events usually occurred when she was pregnant  All events tend to occur when she has been standing for a long time and she is usually out does  She denies other cardiac specific symptoms such as exertional chest pain, shortness of breath palpitations, orthopnea, PND or pedal edema  She is dizzy and physically very active without any significant symptoms    She has had no recent  Decline in her exercise tolerance  her family history significant for her mother having seizure disorder  There is no family history of premature CAD or sudden cardiac death  She has no history of smoking and drinks rarely  She has no history of illicit drug use  She currently works at [employer]  REVIEW OF SYMPTOMS:    Positive for:    Syncopal event in August 2018  Negative for: All remaining as reviewed below and in HPI  SYSTEM SYMPTOMS REVIEWED:  Generalweight change, fever, night sweats  Respirato  Cardiovascularchest pain, syncope, dyspnea on exertion, edema, decline in exercise tolerance, claudication   Gastrointestinalpersistent vomiting, diarrhea, abdominal distention, blood in stool   Muscular or skeletaljoint pain or swelling   Neurologicheadaches, syncope, abnormal movement  Hematologichistory of easy bruising and bleeding   Endocrinethyroid enlargement, heat or cold intolerance, polyuria   Psychiatricanxiety, depression     *-*-*-*-*-*-*-*-*-*-*-*-*-*-*-*-*-*-*-*-*-*-*-*-*-*-*-*-*-*-*-*-*-*-*-*-*-*-*-*-*-*-*-*-*-*-*-*-*-*-*-*-*-*-  VITAL SIGNS:  Vitals:    09/18/18 1542   BP: 110/62   BP Location: Left arm   Patient Position: Sitting   Cuff Size: Adult   Pulse: 75   SpO2: 100%   Weight: 74 5 kg (164 lb 3 2 oz)   Height: 5' 6" (1 676 m)       *-*-*-*-*-*-*-*-*-*-*-*-*-*-*-*-*-*-*-*-*-*-*-*-*-*-*-*-*-*-*-*-*-*-*-*-*-*-*-*-*-*-*-*-*-*-*-*-*-*-*-*-*-*-  PHYSICAL EXAM:  General Appearance:    Alert, cooperative, no distress, appears stated age, NORMAL BUILT   Head, Eyes, ENT:    No obvious abnormality, moist mucous mebranes  Neck:   Supple, no carotid bruit or JVD   Back:     Symmetric, no curvature  Lungs:     Respirations unlabored  Clear to auscultation bilaterally,    Chest wall:    No tenderness or deformity   Heart:    Regular rate and rhythm, S1 and S2 normal, no murmur, rub  or gallop     Abdomen:     Soft, non-tender,  Right paraumbilical region has a healing abscess with discoloration of the skin and some thickening without any obvious discharge  Extremities:   Extremities normal, no cyanosis or edema  Skin:   Skin color, texture, turgor normal, no rashes or lesions     *-*-*-*-*-*-*-*-*-*-*-*-*-*-*-*-*-*-*-*-*-*-*-*-*-*-*-*-*-*-*-*-*-*-*-*-*-*-*-*-*-*-*-*-*-*-*-*-*-*-*-*-*-*-  CURRENT MEDICATION LIST:    Current Outpatient Prescriptions:     desogestrel-ethinyl estradiol (APRI) 0 15-30 MG-MCG per tablet, every 24 hours, Disp: , Rfl:     pentosan polysulfate (ELMIRON) 100 mg capsule, 3 (three) times a day, Disp: , Rfl:     ibuprofen (MOTRIN) 600 mg tablet, Take 1 tablet (600 mg total) by mouth every 6 (six) hours as needed for mild pain (Patient not taking: Reported on 9/18/2018 ), Disp: 60 tablet, Rfl: 0    ALLERGIES:  Allergies   Allergen Reactions    Eggs Or Egg-Derived Products Anaphylaxis    Peanut Oil Anaphylaxis    Baking Soda-Fluoride [Sodium Fluoride] Swelling    Benzocaine Swelling       *-*-*-*-*-*-*-*-*-*-*-*-*-*-*-*-*-*-*-*-*-*-*-*-*-*-*-*-*-*-*-*-*-*-*-*-*-*-*-*-*-*-*-*-*-*-*-*-*-*-*-*-*-*-    LABORATORY DATA:  I have personally reviewed pertinent labs      Sodium   Date Value Ref Range Status   08/10/2018 140 137 - 147 mmol/L Final     Potassium   Date Value Ref Range Status   08/10/2018 4 0 3 6 - 5 0 mmol/L Final     Chloride   Date Value Ref Range Status   08/10/2018 104 97 - 108 mmol/L Final     CO2   Date Value Ref Range Status   08/10/2018 25 22 - 30 mmol/L Final     BUN   Date Value Ref Range Status   08/10/2018 12 5 - 25 mg/dL Final     Creatinine   Date Value Ref Range Status   08/10/2018 0 76 0 60 - 1 20 mg/dL Final     Comment:     Standardized to IDMS reference method     eGFR   Date Value Ref Range Status   08/10/2018 126 >60 ml/min/1 73sq m Final     Calcium   Date Value Ref Range Status   08/10/2018 9 8 8 4 - 10 2 mg/dL Final     AST   Date Value Ref Range Status   08/10/2018 22 14 - 36 U/L Final     Comment:       Specimen collection should occur prior to Sulfasalazine administration due to the potential for falsely depressed results  ALT   Date Value Ref Range Status   08/10/2018 20 9 - 52 U/L Final     Comment:       Specimen collection should occur prior to Sulfasalazine administration due to the potential for falsely depressed results  Alkaline Phosphatase   Date Value Ref Range Status   08/10/2018 48 43 - 122 U/L Final     Magnesium   Date Value Ref Range Status   08/10/2018 2 2 1 6 - 2 3 mg/dL Final     WBC   Date Value Ref Range Status   08/10/2018 5 10 4 50 - 11 00 Thousand/uL Final     Hemoglobin   Date Value Ref Range Status   08/10/2018 13 8 12 0 - 16 0 g/dL Final     Platelets   Date Value Ref Range Status   08/10/2018 221 150 - 450 Thousands/uL Final     No results found for: PT, PTT, INR  No results found for: CKMB, BNP, DIGOXIN  No results found for: TSH, T4, T3  No results found for: CHOL, HDL, CHOLHDLRAT, LDL, TRIG   No results found for: HGBA1C  Gram Stain Result   Date Value Ref Range Status   2018 No Polys or Bacteria seen  Final     Wound Culture   Date Value Ref Range Status   2018 3 colonies Staphylococcus aureus (A)  Final       *-*-*-*-*-*-*-*-*-*-*-*-*-*-*-*-*-*-*-*-*-*-*-*-*-*-*-*-*-*-*-*-*-*-*-*-*-*-*-*-*-*-*-*-*-*-*-*-*-*-*-*-*-*-  PREVIOUS CARDIOLOGY & RADIOLOGY RESULTS:  Results for orders placed during the hospital encounter of 18   Echo complete with contrast if indicated    Narrative [address] [place], [place] [address], [address]  [phone]    Transthoracic Echocardiogram  2D, M-mode, Doppler, and Color Doppler    Study date:  13-Sep-2018    Patient: Rosy Garcia  MR number: [id number]  Account number: [de-identified]  : 1995  Age: 20 years  Gender: Female  Status: Outpatient  Location: [facility]  Height: 66 in  Weight: 170 lb  BP: 118/ 66 mmHg    Indications: Syncope    Diagnoses: R55  - Syncope and collapse    Sonographer:  Nelly Feng Kenya Arellano  Primary Physician:  Gemma Pate MD  Referring Physician:  Gemma Pate MD  Group:  [facility]  Interpreting Physician:  Valeria Montalvo MD    SUMMARY    LEFT VENTRICLE:  Systolic function was normal  Ejection fraction was estimated to be 60 %  There were no regional wall motion abnormalities  MITRAL VALVE:  There was trace regurgitation  TRICUSPID VALVE:  There was mild regurgitation  PULMONIC VALVE:  There was mild to moderate regurgitation  HISTORY: PRIOR HISTORY: Seizures    PROCEDURE: The study was performed in the [address] [place] [address]  This was a routine study  The transthoracic approach was used  The study included complete 2D imaging, M-mode, complete spectral Doppler, and color Doppler  The  heart rate was 89 bpm, at the start of the study  Images were obtained from the parasternal, apical, subcostal, and suprasternal notch acoustic windows  Image quality was adequate  LEFT VENTRICLE: Size was normal  Systolic function was normal  Ejection fraction was estimated to be 60 %  There were no regional wall motion abnormalities  Wall thickness was normal  DOPPLER: The transmitral flow pattern was normal     RIGHT VENTRICLE: The size was normal  Systolic function was normal  Wall thickness was normal     LEFT ATRIUM: Size was normal     RIGHT ATRIUM: Size was normal     MITRAL VALVE: Valve structure was normal  There was normal leaflet separation  DOPPLER: The transmitral velocity was within the normal range  There was no evidence for stenosis  There was trace regurgitation  AORTIC VALVE: The valve was trileaflet  Leaflets exhibited normal thickness and normal cuspal separation  DOPPLER: Transaortic velocity was within the normal range  There was no evidence for stenosis  There was no regurgitation  TRICUSPID VALVE: The valve structure was normal  There was normal leaflet separation   DOPPLER: The transtricuspid velocity was within the normal range  There was no evidence for stenosis  There was mild regurgitation  Pulmonary artery  systolic pressure was within the normal range  Estimated peak PA pressure was 25 mmHg  PULMONIC VALVE: Leaflets exhibited normal thickness, no calcification, and normal cuspal separation  DOPPLER: The transpulmonic velocity was within the normal range  There was mild to moderate regurgitation  PERICARDIUM: There was no pericardial effusion  The pericardium was normal in appearance  AORTA: The root exhibited normal size  SYSTEMIC VEINS: IVC: The inferior vena cava was not well visualized  SYSTEM MEASUREMENT TABLES    2D  Ao Diam: 2 7 cm  IVSd: 0 8 cm  LA Diam: 3 1 cm  LVIDd: 5 cm  LVIDs: 3 5 cm  LVPWd: 0 8 cm    CW  TR Vmax: 2 3 m/s  TR maxP 4 mmHg    PW  E': 0 1 m/s  E/E': 9 1  MV A Erick: 0 7 m/s  MV Dec Childress: 6 9 m/s2  MV DecT: 179 3 ms  MV E Erick: 1 2 m/s  MV E/A Ratio: 1 8    [facility]    Prepared and electronically signed by    Davonte Bates MD  Signed 14-Sep-2018 10:55:23       No results found for this or any previous visit  No results found for this or any previous visit  No results found for this or any previous visit  No results found  *-*-*-*-*-*-*-*-*-*-*-*-*-*-*-*-*-*-*-*-*-*-*-*-*-*-*-*-*-*-*-*-*-*-*-*-*-*-*-*-*-*-*-*-*-*-*-*-*-*-*-*-*-*-  SIGNATURES:   @LFZ@   Monique Mcneal MD     CC:   MD Nohemy Kidd MD   *-*-*-*-*-*-*-*-*-*-*-*-*-*-*-*-*-*-*-*-*-*-*-*-*-*-*-*-*-*-*-*-*-*-*-*-*-*-*-*-*-*-*-*-*-*-*-*-*-*-*-*-*-*-    Social History     Social History    Marital status: Not Applicable     Spouse name: N/A    Number of children: N/A    Years of education: N/A     Occupational History    Not on file       Social History Main Topics    Smoking status: Never Smoker    Smokeless tobacco: Never Used    Alcohol use Yes      Comment: rarely; about 3x a year    Drug use: No    Sexual activity: Not on file     Other Topics Concern    Not on file     Social History Narrative    No narrative on file      No family history on file  No past surgical history on file

## 2018-09-27 ENCOUNTER — OFFICE VISIT (OUTPATIENT)
Dept: FAMILY MEDICINE CLINIC | Facility: CLINIC | Age: 23
End: 2018-09-27
Payer: COMMERCIAL

## 2018-09-27 VITALS
RESPIRATION RATE: 16 BRPM | TEMPERATURE: 96.8 F | OXYGEN SATURATION: 99 % | BODY MASS INDEX: 27.12 KG/M2 | DIASTOLIC BLOOD PRESSURE: 80 MMHG | WEIGHT: 168 LBS | HEART RATE: 68 BPM | SYSTOLIC BLOOD PRESSURE: 120 MMHG

## 2018-09-27 DIAGNOSIS — B95.62 INFECTION OF SKIN DUE TO METHICILLIN RESISTANT STAPHYLOCOCCUS AUREUS (MRSA): Primary | ICD-10-CM

## 2018-09-27 DIAGNOSIS — L08.9 INFECTION OF SKIN DUE TO METHICILLIN RESISTANT STAPHYLOCOCCUS AUREUS (MRSA): Primary | ICD-10-CM

## 2018-09-27 PROCEDURE — 99213 OFFICE O/P EST LOW 20 MIN: CPT | Performed by: PHYSICIAN ASSISTANT

## 2018-09-27 RX ORDER — CHLORHEXIDINE GLUCONATE 4 G/100ML
1 SOLUTION TOPICAL DAILY PRN
Qty: 118 ML | Refills: 0 | Status: SHIPPED | OUTPATIENT
Start: 2018-09-27 | End: 2018-10-18

## 2018-09-27 RX ORDER — SULFAMETHOXAZOLE AND TRIMETHOPRIM 800; 160 MG/1; MG/1
1 TABLET ORAL EVERY 12 HOURS SCHEDULED
Qty: 28 TABLET | Refills: 0 | Status: SHIPPED | OUTPATIENT
Start: 2018-09-27 | End: 2018-10-11

## 2018-09-27 NOTE — PROGRESS NOTES
Assessment/Plan:    Infection of skin due to methicillin resistant Staphylococcus aureus (MRSA)  - patient has multiple abscesses on her groin   - culture showed the patient's abdominal lesion last month had clusters of staph aureus, but did not specify whether it was MRSA   - will treat patient with a 2 week course of bactrim, as well as prescribe nasal mupirocin  - also recommended to the patient to bathe using chlorhexidine rinse; I ordered the patient a bottle of this and did give her permission to allow her significant other to use this as well-- advised that they bathe with this for 2 days   - if lesions start to harden or get larger, patient is instructed to come to the clinic for I&D to help relieve the pus in the wounds        Diagnoses and all orders for this visit:    Infection of skin due to methicillin resistant Staphylococcus aureus (MRSA)  -     sulfamethoxazole-trimethoprim (BACTRIM DS) 800-160 mg per tablet; Take 1 tablet by mouth every 12 (twelve) hours for 14 days  -     mupirocin (BACTROBAN) 2 % nasal ointment; into each nostril daily  -     chlorhexidine (HIBICLENS) 4 % external liquid; Apply 1 application topically daily as needed for wound care          Subjective: Patient comes in because she has lesions on her groin area and buttocks draining pus  Patient states that she developed these lesions about a month ago  Prior to the appearance of these lesions on her groin, she had been treated for a stomach abscess, for which she was given a combination of keflex and bactrim, as well as underwent an I&D procedure with culture that showed she was positive for clusters of staph aureus  Patient states that the lesions she has now do hurt her and drain purulent material on a regular basis  She has made sure to wash her linens, towels, and sheets thoroughly, as well as changed her body wash to all antibacterial soaps   She states her significant other has been having the same problems with abscesses, but has not been to a doctor to be treated for them  Patient has not shaved groin area in 2 years  Patient ID: Maria M Davila is a 20 y o  female  HPI    The following portions of the patient's history were reviewed and updated as appropriate: She  has a past medical history of Anemia and Heart murmur  Patient Active Problem List    Diagnosis Date Noted    Infection of skin due to methicillin resistant Staphylococcus aureus (MRSA) 09/27/2018    Overweight (BMI 25 0-29 9) 08/24/2018    Abdominal wall abscess 08/17/2018    Neurocardiogenic syncope 08/10/2018    Encounter for examination following treatment at hospital 08/10/2018    Fatigue 02/27/2018    Iron deficiency anemia 02/27/2018     She  has no past surgical history on file  Her family history is not on file  She  reports that she has never smoked  She has never used smokeless tobacco  She reports that she drinks alcohol  She reports that she does not use drugs  Current Outpatient Prescriptions   Medication Sig Dispense Refill    chlorhexidine (HIBICLENS) 4 % external liquid Apply 1 application topically daily as needed for wound care 118 mL 0    desogestrel-ethinyl estradiol (APRI) 0 15-30 MG-MCG per tablet every 24 hours      ibuprofen (MOTRIN) 600 mg tablet Take 1 tablet (600 mg total) by mouth every 6 (six) hours as needed for mild pain (Patient not taking: Reported on 9/18/2018 ) 60 tablet 0    mupirocin (BACTROBAN) 2 % nasal ointment into each nostril daily 10 g 0    pentosan polysulfate (ELMIRON) 100 mg capsule 3 (three) times a day      sulfamethoxazole-trimethoprim (BACTRIM DS) 800-160 mg per tablet Take 1 tablet by mouth every 12 (twelve) hours for 14 days 28 tablet 0     No current facility-administered medications for this visit        Current Outpatient Prescriptions on File Prior to Visit   Medication Sig    desogestrel-ethinyl estradiol (APRI) 0 15-30 MG-MCG per tablet every 24 hours    ibuprofen (MOTRIN) 600 mg tablet Take 1 tablet (600 mg total) by mouth every 6 (six) hours as needed for mild pain (Patient not taking: Reported on 9/18/2018 )    pentosan polysulfate (ELMIRON) 100 mg capsule 3 (three) times a day     No current facility-administered medications on file prior to visit  She is allergic to eggs or egg-derived products; peanut oil; baking soda-fluoride [sodium fluoride]; and benzocaine       Review of Systems   Constitutional: Negative  Respiratory: Negative for chest tightness, shortness of breath and wheezing  Cardiovascular: Negative for chest pain and palpitations  Gastrointestinal: Negative for constipation, diarrhea, nausea and vomiting  Genitourinary: Negative for genital sores  Lesions are on the groin and suprapubic area    Skin: Positive for rash and wound  Negative for color change and pallor  Hematological: Negative for adenopathy  Does not bruise/bleed easily  Objective:      /80 (BP Location: Left arm, Patient Position: Sitting, Cuff Size: Adult)   Pulse 68   Temp (!) 96 8 °F (36 °C) (Temporal)   Resp 16   Wt 76 2 kg (168 lb)   SpO2 99%   BMI 27 12 kg/m²          Physical Exam   Constitutional: She is oriented to person, place, and time  She appears well-developed and well-nourished  No distress  HENT:   Head: Normocephalic and atraumatic  Cardiovascular: Normal rate and regular rhythm  Pulmonary/Chest: Effort normal    Musculoskeletal: Normal range of motion  Neurological: She is alert and oriented to person, place, and time  Skin: Skin is warm and dry  She is not diaphoretic     Patient has three lesions in her groin area, as well as one on her right buttock; none are actively oozing purulent or sanguinous material on examination; 1 5 cm x 1 5 cm lesion on her right suprapubic region seems to have hardened pocket of pus beneath lesions, but none of the other lesions have the same character or consistency; there are no lesions anywhere else on patient's body

## 2018-09-27 NOTE — ASSESSMENT & PLAN NOTE
- patient has multiple abscesses on her groin   - culture showed the patient's abdominal lesion last month had clusters of staph aureus, but did not specify whether it was MRSA   - will treat patient with a 2 week course of bactrim, as well as prescribe nasal mupirocin  - also recommended to the patient to bathe using chlorhexidine rinse; I ordered the patient a bottle of this and did give her permission to allow her significant other to use this as well-- advised that they bathe with this for 2 days   - if lesions start to harden or get larger, patient is instructed to come to the clinic for I&D to help relieve the pus in the wounds

## 2018-10-18 ENCOUNTER — OFFICE VISIT (OUTPATIENT)
Dept: URGENT CARE | Age: 23
End: 2018-10-18
Payer: COMMERCIAL

## 2018-10-18 VITALS
SYSTOLIC BLOOD PRESSURE: 115 MMHG | OXYGEN SATURATION: 100 % | WEIGHT: 164 LBS | RESPIRATION RATE: 18 BRPM | HEART RATE: 95 BPM | TEMPERATURE: 97.6 F | BODY MASS INDEX: 25.74 KG/M2 | HEIGHT: 67 IN | DIASTOLIC BLOOD PRESSURE: 67 MMHG

## 2018-10-18 DIAGNOSIS — J45.901 ASTHMA WITH ACUTE EXACERBATION, UNSPECIFIED ASTHMA SEVERITY, UNSPECIFIED WHETHER PERSISTENT: ICD-10-CM

## 2018-10-18 DIAGNOSIS — J06.9 ACUTE URI: Primary | ICD-10-CM

## 2018-10-18 PROCEDURE — 99283 EMERGENCY DEPT VISIT LOW MDM: CPT | Performed by: PHYSICIAN ASSISTANT

## 2018-10-18 PROCEDURE — G0382 LEV 3 HOSP TYPE B ED VISIT: HCPCS | Performed by: PHYSICIAN ASSISTANT

## 2018-10-18 RX ORDER — BROMPHENIRAMINE MALEATE, PSEUDOEPHEDRINE HYDROCHLORIDE, AND DEXTROMETHORPHAN HYDROBROMIDE 2; 30; 10 MG/5ML; MG/5ML; MG/5ML
5 SYRUP ORAL 4 TIMES DAILY PRN
Qty: 120 ML | Refills: 0 | Status: SHIPPED | OUTPATIENT
Start: 2018-10-18 | End: 2018-11-15 | Stop reason: ALTCHOICE

## 2018-10-18 RX ORDER — ALBUTEROL SULFATE 90 UG/1
2 AEROSOL, METERED RESPIRATORY (INHALATION) EVERY 4 HOURS PRN
Qty: 1 INHALER | Refills: 0 | Status: SHIPPED | OUTPATIENT
Start: 2018-10-18 | End: 2019-04-26

## 2018-10-18 RX ORDER — PREDNISONE 20 MG/1
20 TABLET ORAL DAILY
Qty: 7 TABLET | Refills: 0 | Status: SHIPPED | OUTPATIENT
Start: 2018-10-18 | End: 2018-10-25

## 2018-10-18 NOTE — LETTER
October 18, 2018     Patient: Kunal Decker   YOB: 1995   Date of Visit: 10/18/2018       To Whom It May Concern: It is my medical opinion that Kunal Decker may return to work on 10/18/2018  If you have any questions or concerns, please don't hesitate to call           Sincerely,        Jacquelyn Nathan PA-C    CC: No Recipients

## 2018-10-18 NOTE — PROGRESS NOTES
[facility]        NAME: Leonard Bridges is a 20 y o  female  : 1995    MRN: [id number]  DATE: 2018  TIME: 9:46 AM    Assessment and Plan   Acute URI [J06 9]  1  Acute URI  brompheniramine-pseudoephedrine-DM 30-2-10 MG/5ML syrup   2  Asthma with acute exacerbation, unspecified asthma severity, unspecified whether persistent  albuterol (VENTOLIN HFA) 90 mcg/act inhaler    predniSONE 20 mg tablet         Patient Instructions     Take Bromfed DM, prednisone and albuterol inhaler as prescribed  Fluids and rest (Warm water with honey and lemon)  Tylenol/Ibuprofen for pain fever  Follow up with PCP in 3-5 days  Proceed to  ER if symptoms worsen  Chief Complaint     Chief Complaint   Patient presents with    Cough     Pt c/o dry cough, chest tightness when coughing, swollen glands since last Friday  Denies fever  History of Present Illness       Patient states she took 1 PCN from previous infection with relief  Cough   This is a new problem  The current episode started in the past 7 days  The problem has been gradually worsening  The problem occurs every few minutes  The cough is non-productive  Associated symptoms include a sore throat and wheezing  Pertinent negatives include no chest pain, chills, ear congestion, ear pain, fever, headaches, heartburn, hemoptysis, myalgias, nasal congestion, postnasal drip, rash, rhinorrhea, shortness of breath, sweats or weight loss  She has tried nothing for the symptoms  Her past medical history is significant for asthma  There is no history of bronchitis or pneumonia  Review of Systems   Review of Systems   Constitutional: Negative for activity change, appetite change, chills, fatigue, fever and weight loss  HENT: Positive for sore throat  Negative for congestion, ear discharge, ear pain, postnasal drip, rhinorrhea, sinus pain, sinus pressure, sneezing and trouble swallowing      Respiratory: Positive for cough, chest tightness and wheezing  Negative for hemoptysis and shortness of breath  Cardiovascular: Negative for chest pain  Gastrointestinal: Negative for abdominal pain, diarrhea, heartburn, nausea and vomiting  Musculoskeletal: Negative for myalgias  Skin: Negative for rash  Neurological: Negative for light-headedness and headaches  Current Medications       Current Outpatient Prescriptions:     albuterol (VENTOLIN HFA) 90 mcg/act inhaler, Inhale 2 puffs every 4 (four) hours as needed for wheezing, Disp: 1 Inhaler, Rfl: 0    brompheniramine-pseudoephedrine-DM 30-2-10 MG/5ML syrup, Take 5 mL by mouth 4 (four) times a day as needed for cough, Disp: 120 mL, Rfl: 0    desogestrel-ethinyl estradiol (APRI) 0 15-30 MG-MCG per tablet, every 24 hours, Disp: , Rfl:     pentosan polysulfate (ELMIRON) 100 mg capsule, 3 (three) times a day, Disp: , Rfl:     predniSONE 20 mg tablet, Take 1 tablet (20 mg total) by mouth daily for 7 days, Disp: 7 tablet, Rfl: 0    Current Allergies     Allergies as of 10/18/2018 - Reviewed 10/18/2018   Allergen Reaction Noted    Eggs or egg-derived products Anaphylaxis 06/06/2018    Peanut oil Anaphylaxis 06/06/2018    Baking soda-fluoride [sodium fluoride] Swelling 08/10/2018    Benzocaine Swelling 06/06/2018            The following portions of the patient's history were reviewed and updated as appropriate: allergies, current medications, past family history, past medical history, past social history, past surgical history and problem list      Past Medical History:   Diagnosis Date    Anemia     Heart murmur        History reviewed  No pertinent surgical history  History reviewed  No pertinent family history  Medications have been verified          Objective   /67   Pulse 95   Temp 97 6 °F (36 4 °C)   Resp 18   Ht 5' 6 5" (1 689 m)   Wt 74 4 kg (164 lb)   SpO2 100%   BMI 26 07 kg/m²        Physical Exam     Physical Exam   Constitutional: She is oriented to person, place, and time  She appears well-developed and well-nourished  No distress  HENT:   Head: Normocephalic  Right Ear: External ear normal    Left Ear: External ear normal    Nose: Nose normal    Mouth/Throat: Oropharynx is clear and moist  No oropharyngeal exudate  Eyes: Conjunctivae are normal    Cardiovascular: Normal rate, regular rhythm, normal heart sounds and intact distal pulses  Exam reveals no gallop and no friction rub  No murmur heard  Pulmonary/Chest: Effort normal and breath sounds normal  No respiratory distress  She has no wheezes  She has no rales  She exhibits no tenderness  Lymphadenopathy:     She has no cervical adenopathy  Neurological: She is alert and oriented to person, place, and time  Skin: Skin is warm  She is not diaphoretic  Psychiatric: She has a normal mood and affect  Her behavior is normal  Judgment and thought content normal    Vitals reviewed

## 2018-10-18 NOTE — PATIENT INSTRUCTIONS
Take Bromfed DM, prednisone and albuterol inhaler as prescribed  Fluids and rest (Warm water with honey and lemon)  Tylenol/Ibuprofen for pain fever  Follow up with PCP in 3-5 days  Proceed to  ER if symptoms worsen  Bronchospasm   WHAT YOU NEED TO KNOW:   Bronchospasm is a narrowing of the airway that usually comes and goes  You may be at risk for bronchospasm if you have a chest cold or allergies  You may also be at risk if you are bothered by air pollution, certain medicines, cold, dry air, smoke, or strong odors  Exercise may worsen your symptoms  Bronchospasms may make it hard for you to breathe  DISCHARGE INSTRUCTIONS:   Medicines: You may need any of the following:  · Bronchodilators  help expand your airway for easier breathing  Some of these medicines may help prevent future spasms  · Inhaled steroids  help reduce swelling in your airway and soothe your breathing  These are used for long-term control  · Anticholinergics  help relax and open your airway  · Take your medicine as directed  Contact your healthcare provider if you think your medicine is not helping or if you have side effects  Tell him of her if you are allergic to any medicine  Keep a list of the medicines, vitamins, and herbs you take  Include the amounts, and when and why you take them  Bring the list or the pill bottles to follow-up visits  Carry your medicine list with you in case of an emergency  Follow up with your healthcare provider as directed: You may need more tests to find the cause of your condition  Write down your questions so you remember to ask them during your visits  Self-care:   · Avoid triggers  · Warm up before you exercise  Ask your healthcare provider about the best exercise plan for you  · Try to avoid people who are sick  Ask your healthcare provider if you need a flu or pneumonia vaccine  · Breathe through your nose when you are in cold, dry air or weather   This may help reduce lung irritation by warming the air before it reaches your lungs  Contact your healthcare provider if:   · You have a fever  · You have a cough that will not go away  · Your wheezing worsens  · You have questions or concerns about your condition or care  Return to the emergency department if:   · You cough or spit up blood  · You have trouble breathing  · You have blue fingernails or toenails  · You have chest pain  · You have a fast or uneven heartbeat  © 2017 [facility] Information is for End User's use only and may not be sold, redistributed or otherwise used for commercial purposes  All illustrations and images included in CareNotes® are the copyrighted property of [employer]  or Wilbur Benson  The above information is an  only  It is not intended as medical advice for individual conditions or treatments  Talk to your doctor, nurse or pharmacist before following any medical regimen to see if it is safe and effective for you

## 2018-11-05 ENCOUNTER — APPOINTMENT (EMERGENCY)
Dept: RADIOLOGY | Facility: HOSPITAL | Age: 23
End: 2018-11-05
Payer: COMMERCIAL

## 2018-11-05 ENCOUNTER — HOSPITAL ENCOUNTER (EMERGENCY)
Facility: HOSPITAL | Age: 23
Discharge: HOME | End: 2018-11-05
Attending: EMERGENCY MEDICINE | Admitting: EMERGENCY MEDICINE
Payer: COMMERCIAL

## 2018-11-05 VITALS
HEIGHT: 66 IN | OXYGEN SATURATION: 98 % | HEART RATE: 120 BPM | WEIGHT: 166.31 LBS | TEMPERATURE: 101.1 F | SYSTOLIC BLOOD PRESSURE: 115 MMHG | BODY MASS INDEX: 26.73 KG/M2 | RESPIRATION RATE: 16 BRPM | DIASTOLIC BLOOD PRESSURE: 68 MMHG

## 2018-11-05 DIAGNOSIS — J18.9 PNEUMONIA OF RIGHT LOWER LOBE DUE TO INFECTIOUS ORGANISM: Primary | ICD-10-CM

## 2018-11-05 LAB
EXT PREG TEST URINE: NEGATIVE
FLUAV + FLUBV RNA ISLT NAA+PROBE: NOT DETECTED
FLUAV + FLUBV RNA ISLT NAA+PROBE: NOT DETECTED

## 2018-11-05 PROCEDURE — 71046 X-RAY EXAM CHEST 2 VIEWS: CPT

## 2018-11-05 PROCEDURE — 99283 EMERGENCY DEPT VISIT LOW MDM: CPT

## 2018-11-05 PROCEDURE — 81025 URINE PREGNANCY TEST: CPT | Performed by: EMERGENCY MEDICINE

## 2018-11-05 PROCEDURE — 87502 INFLUENZA DNA AMP PROBE: CPT | Performed by: EMERGENCY MEDICINE

## 2018-11-05 RX ORDER — AMOXICILLIN 500 MG/1
1000 CAPSULE ORAL EVERY 8 HOURS SCHEDULED
Qty: 42 CAPSULE | Refills: 0 | Status: SHIPPED | OUTPATIENT
Start: 2018-11-05 | End: 2018-11-12

## 2018-11-05 RX ORDER — AZITHROMYCIN 250 MG/1
TABLET, FILM COATED ORAL
Qty: 6 TABLET | Refills: 0 | Status: SHIPPED | OUTPATIENT
Start: 2018-11-05 | End: 2018-11-09

## 2018-11-05 RX ORDER — ACETAMINOPHEN 325 MG/1
975 TABLET ORAL ONCE
Status: COMPLETED | OUTPATIENT
Start: 2018-11-05 | End: 2018-11-05

## 2018-11-05 RX ADMIN — ACETAMINOPHEN 975 MG: 325 TABLET ORAL at 11:52

## 2018-11-05 NOTE — DISCHARGE INSTRUCTIONS
Community Acquired Pneumonia   WHAT YOU NEED TO KNOW:   Community-acquired pneumonia (CAP) is a lung infection that you get outside of a hospital or nursing home setting  Your lungs become inflamed and cannot work well  CAP may be caused by bacteria, viruses, or fungi  DISCHARGE INSTRUCTIONS:   Return to the emergency department if:   · You are confused and cannot think clearly  · You have increased trouble breathing  · Your lips or fingernails turn gray or blue  Contact your healthcare provider if:   · Your symptoms do not get better, or they get worse  · You are urinating less, or not at all  · You have questions or concerns about your condition or care  Medicines:   · Medicines  may be given to treat a bacterial, viral, or fungal infection  You may also be given medicines to dilate your bronchial tubes to help you breathe more easily  · Take your medicine as directed  Contact your healthcare provider if you think your medicine is not helping or if you have side effects  Tell him or her if you are allergic to any medicine  Keep a list of the medicines, vitamins, and herbs you take  Include the amounts, and when and why you take them  Bring the list or the pill bottles to follow-up visits  Carry your medicine list with you in case of an emergency  Follow up with your healthcare provider within 3 days or as directed: You may need another x-ray  Write down your questions so you remember to ask them during your visits  Deep breathing and coughing:  Deep breathing helps open the air passages in your lungs  Coughing helps bring up mucus from your lungs  Take a deep breath and hold the breath as long as you can  Then push the air out of your lungs with a deep, strong cough  Spit out any mucus you have coughed up  Take 10 deep breaths in a row every hour that you are awake  Remember to follow each deep breath with a cough     Do not smoke or allow others to smoke around you:  Nicotine and other chemicals in cigarettes and cigars can cause lung damage  Ask your healthcare provider for information if you currently smoke and need help to quit  E-cigarettes or smokeless tobacco still contain nicotine  Talk to your healthcare provider before you use these products  Manage CAP at home:   · Breathe warm, moist air  This helps loosen mucus  Loosely place a warm, wet washcloth over your nose and mouth  A room humidifier may also help make the air moist     · Drink liquids as directed  Ask your healthcare provider how much liquid to drink each day and which liquids to drink  Liquids help make mucus thin and easier to get out of your body  · Gently tap your chest   This helps loosen mucus so it is easier to cough  Lie with your head lower than your chest several times a day and tap your chest      · Get plenty of rest   Rest helps your body heal   Prevent CAP:   · Wash your hands often with soap and water  Carry germ-killing hand gel with you  You can use the gel to clean your hands when soap and water are not available  Do not touch your eyes, nose, or mouth unless you have washed your hands first      · Clean surfaces often  Clean doorknobs, countertops, cell phones, and other surfaces that are touched often  · Always cover your mouth when you cough  Cough into a tissue or your shirtsleeve so you do not spread germs from your hands  · Try to avoid people who have a cold or the flu  If you are sick, stay away from others as much as possible  · Ask about vaccines  You may need a vaccine to help prevent pneumonia  Get an influenza (flu) vaccine every year as soon as it becomes available  © 2017 [address] [employer] Information is for End User's use only and may not be sold, redistributed or otherwise used for commercial purposes  All illustrations and images included in CareNotes® are the copyrighted property of [employer]  or Wilbur Benson    The above information is an  only  It is not intended as medical advice for individual conditions or treatments  Talk to your doctor, nurse or pharmacist before following any medical regimen to see if it is safe and effective for you

## 2018-11-05 NOTE — ED PROVIDER NOTES
History  Chief Complaint   Patient presents with    Flu Symptoms     pt states she has had generalized body aches, fevers, nausea, and difficulty breathing for over a week now  Pt states she went to Urgent care 2 weeks ago and left with asthma pump and prednisone  Pt took with no relief  Pt has not taken anything today for symptoms  18-year-old female with a history of asthma presents with 1 day of fever, chills, body aches  She reports she has had a cough for about 3 weeks which has been predominantly dry  All the other symptoms started yesterday  She has fatigue, nausea but no vomiting  Has taken Tylenol with some relief of symptoms  Did not take any medication today  Has not seen her primary care doctor for this  No urinary symptoms, flank pain, diarrhea  She has mild dyspnea on exertion but not at rest   There is chest pain when she coughs but not at rest         History provided by:  Patient  Flu Symptoms   Presenting symptoms: cough, fatigue, fever, myalgias, nausea and shortness of breath    Presenting symptoms: no diarrhea, no rhinorrhea, no sore throat and no vomiting    Severity:  Mild  Onset quality:  Gradual  Duration:  1 day  Progression:  Worsening  Chronicity:  New  Relieved by:  OTC medications  Worsened by:  Nothing  Associated symptoms: chills, decreased appetite and nasal congestion    Associated symptoms: no neck stiffness and no syncope    Risk factors: not elderly        Prior to Admission Medications   Prescriptions Last Dose Informant Patient Reported? Taking?    albuterol (VENTOLIN HFA) 90 mcg/act inhaler   No No   Sig: Inhale 2 puffs every 4 (four) hours as needed for wheezing   brompheniramine-pseudoephedrine-DM 30-2-10 MG/5ML syrup   No No   Sig: Take 5 mL by mouth 4 (four) times a day as needed for cough   desogestrel-ethinyl estradiol (APRI) 0 15-30 MG-MCG per tablet  Self Yes No   Sig: every 24 hours   pentosan polysulfate (ELMIRON) 100 mg capsule  Self Yes No   Sig: 3 (three) times a day      Facility-Administered Medications: None       Past Medical History:   Diagnosis Date    Anemia     Asthma     Heart murmur        History reviewed  No pertinent surgical history  History reviewed  No pertinent family history  I have reviewed and agree with the history as documented  Social History   Substance Use Topics    Smoking status: Never Smoker    Smokeless tobacco: Never Used    Alcohol use Yes      Comment: rarely; about 3x a year        Review of Systems   Constitutional: Positive for chills, decreased appetite, fatigue and fever  HENT: Positive for congestion  Negative for rhinorrhea and sore throat  Eyes: Negative for redness  Respiratory: Positive for cough and shortness of breath  Cardiovascular: Negative for chest pain  Gastrointestinal: Positive for nausea  Negative for abdominal pain, diarrhea and vomiting  Genitourinary: Negative for dysuria, frequency, hematuria and urgency  Musculoskeletal: Positive for myalgias  Negative for back pain, neck pain and neck stiffness  Skin: Negative for rash  Neurological: Negative for dizziness, weakness and light-headedness  Psychiatric/Behavioral: Negative for suicidal ideas  All other systems reviewed and are negative  Physical Exam  Physical Exam   Constitutional: She is oriented to person, place, and time  She appears well-developed and well-nourished  No distress  HENT:   Head: Normocephalic and atraumatic  Right Ear: External ear normal    Left Ear: External ear normal    Nose: Nose normal    Mouth/Throat: Oropharynx is clear and moist    Eyes: Pupils are equal, round, and reactive to light  Conjunctivae are normal    Neck: Normal range of motion  Neck supple  Cardiovascular: Regular rhythm  Murmur heard  Tachycardic   Pulmonary/Chest: Effort normal and breath sounds normal  No respiratory distress  She has no wheezes  She exhibits no tenderness  Abdominal: Soft   Bowel sounds are normal  She exhibits no distension  There is no tenderness  Musculoskeletal: Normal range of motion  She exhibits no edema or tenderness  Neurological: She is alert and oriented to person, place, and time  Skin: Skin is warm and dry  Capillary refill takes less than 2 seconds  No rash noted  Psychiatric: She has a normal mood and affect  Nursing note and vitals reviewed  Vital Signs  ED Triage Vitals   Temperature Pulse Respirations Blood Pressure SpO2   11/05/18 1053 11/05/18 1053 11/05/18 1053 11/05/18 1053 11/05/18 1053   (!) 101 1 °F (38 4 °C) (!) 120 16 115/68 98 %      Temp Source Heart Rate Source Patient Position - Orthostatic VS BP Location FiO2 (%)   11/05/18 1053 11/05/18 1053 11/05/18 1053 11/05/18 1053 --   Tympanic Monitor Sitting Left arm       Pain Score       11/05/18 1152       8           Vitals:    11/05/18 1053   BP: 115/68   Pulse: (!) 120   Patient Position - Orthostatic VS: Sitting       Visual Acuity      ED Medications  Medications   acetaminophen (TYLENOL) tablet 975 mg (975 mg Oral Given 11/5/18 1152)       Diagnostic Studies  Results Reviewed     Procedure Component Value Units Date/Time    Rapid Flu-Viral RNA amplification [facility] HEART ONLY) [[id number]]  (Normal) Collected:  11/05/18 1156    Lab Status:  Final result Specimen:  Nares from Nose Updated:  11/05/18 1233     INFLUENZA A AMPLIFIED RNA Not Detected     INFLUENZA B AMPLIFIED Not Detected    POCT pregnancy, urine [[id number]]  (Normal) Resulted:  11/05/18 1209    Lab Status:  Final result Updated:  11/05/18 1210     EXT PREG TEST UR (Ref: Negative) negative                 XR chest 2 views    (Results Pending)              Procedures  Procedures       Phone Contacts  ED Phone Contact    ED Course  ED Course as of Nov 05 1323   Mon Nov 05, 2018   1234 Flu neg  Await CXR  36 Paulina anus in the right lower lobe and possibly the left lower lobe consistent with infiltrate/pneumonia    We will place the patient on antibiotics upon discharge  MDM  Number of Diagnoses or Management Options     Amount and/or Complexity of Data Reviewed  Clinical lab tests: ordered and reviewed  Tests in the radiology section of CPT®: ordered and reviewed  Decide to obtain previous medical records or to obtain history from someone other than the patient: yes  Review and summarize past medical records: yes  Independent visualization of images, tracings, or specimens: yes    Risk of Complications, Morbidity, and/or Mortality  Presenting problems: moderate  Management options: low      CritCare Time    Disposition  Final diagnoses:   Pneumonia of right lower lobe due to infectious organism [facility])     Time reflects when diagnosis was documented in both MDM as applicable and the Disposition within this note     Time User Action Codes Description Comment    11/5/2018  1:22 PM Aristeo Montaño Add [J18 1] Pneumonia of right lower lobe due to infectious organism [facility])       ED Disposition     ED Disposition Condition Comment    Discharge  Selestino Halsted discharge to home/self care  Condition at discharge: Good        Follow-up Information     Follow up With Specialties Details Why Contact Info    Your primary doctor  Schedule an appointment as soon as possible for a visit            Patient's Medications   Discharge Prescriptions    AMOXICILLIN (AMOXIL) 500 MG CAPSULE    Take 2 capsules (1,000 mg total) by mouth every 8 (eight) hours for 7 days       Start Date: 11/5/2018 End Date: 11/12/2018       Order Dose: 1,000 mg       Quantity: 42 capsule    Refills: 0    AZITHROMYCIN (ZITHROMAX) 250 MG TABLET    Take 2 tablets today then 1 tablet daily x 4 days       Start Date: 11/5/2018 End Date: 11/9/2018       Order Dose: --       Quantity: 6 tablet    Refills: 0     No discharge procedures on file      ED Provider  Electronically Signed by           Fred Disla MD  11/05/18 9866

## 2018-11-10 ENCOUNTER — HOSPITAL ENCOUNTER (EMERGENCY)
Facility: HOSPITAL | Age: 23
Discharge: HOME | End: 2018-11-10
Attending: EMERGENCY MEDICINE
Payer: COMMERCIAL

## 2018-11-10 VITALS
RESPIRATION RATE: 18 BRPM | BODY MASS INDEX: 25.87 KG/M2 | WEIGHT: 160.27 LBS | TEMPERATURE: 97.6 F | HEART RATE: 110 BPM | DIASTOLIC BLOOD PRESSURE: 75 MMHG | SYSTOLIC BLOOD PRESSURE: 126 MMHG | OXYGEN SATURATION: 95 %

## 2018-11-10 DIAGNOSIS — K05.10 GINGIVOSTOMATITIS: Primary | ICD-10-CM

## 2018-11-10 LAB
ALBUMIN SERPL BCP-MCNC: 4.4 G/DL (ref 3–5.2)
ALP SERPL-CCNC: 46 U/L (ref 43–122)
ALT SERPL W P-5'-P-CCNC: 22 U/L (ref 9–52)
ANION GAP SERPL CALCULATED.3IONS-SCNC: 12 MMOL/L (ref 5–14)
AST SERPL W P-5'-P-CCNC: 31 U/L (ref 14–36)
BILIRUB SERPL-MCNC: 0.6 MG/DL
BUN SERPL-MCNC: 9 MG/DL (ref 5–25)
CALCIUM SERPL-MCNC: 9.6 MG/DL (ref 8.4–10.2)
CHLORIDE SERPL-SCNC: 103 MMOL/L (ref 97–108)
CO2 SERPL-SCNC: 23 MMOL/L (ref 22–30)
CREAT SERPL-MCNC: 0.73 MG/DL (ref 0.6–1.2)
ERYTHROCYTE [DISTWIDTH] IN BLOOD BY AUTOMATED COUNT: 12.7 %
GFR SERPL CREATININE-BSD FRML MDRD: 132 ML/MIN/1.73SQ M
GIANT PLATELETS BLD QL SMEAR: PRESENT
GLUCOSE SERPL-MCNC: 82 MG/DL (ref 70–99)
HCT VFR BLD AUTO: 41.2 % (ref 36–46)
HGB BLD-MCNC: 13.7 G/DL (ref 12–16)
LYMPHOCYTES # BLD AUTO: 3.02 THOUSAND/UL (ref 0.5–4)
LYMPHOCYTES # BLD AUTO: 54 % (ref 20–50)
MCH RBC QN AUTO: 30.1 PG (ref 26–34)
MCHC RBC AUTO-ENTMCNC: 33.3 G/DL (ref 31–36)
MCV RBC AUTO: 90 FL (ref 80–100)
MONOCYTES # BLD AUTO: 0.56 THOUSAND/UL (ref 0.2–0.9)
MONOCYTES NFR BLD AUTO: 10 % (ref 1–10)
NEUTS BAND NFR BLD MANUAL: 1 % (ref 0–8)
NEUTS SEG # BLD: 1.23 THOUSAND/UL (ref 1.8–7.8)
NEUTS SEG NFR BLD AUTO: 21 %
PLATELET # BLD AUTO: 245 THOUSANDS/UL (ref 150–450)
PLATELET BLD QL SMEAR: ADEQUATE
PMV BLD AUTO: 9.7 FL (ref 8.9–12.7)
POTASSIUM SERPL-SCNC: 4 MMOL/L (ref 3.6–5)
PROT SERPL-MCNC: 8.6 G/DL (ref 5.9–8.4)
RBC # BLD AUTO: 4.56 MILLION/UL (ref 4–5.2)
RBC MORPH BLD: NORMAL
S PYO AG THROAT QL: NEGATIVE
SODIUM SERPL-SCNC: 138 MMOL/L (ref 137–147)
TOTAL CELLS COUNTED SPEC: 100
VARIANT LYMPHS # BLD AUTO: 14 % (ref 0–0)
WBC # BLD AUTO: 5.6 THOUSAND/UL (ref 4.5–11)

## 2018-11-10 PROCEDURE — 96361 HYDRATE IV INFUSION ADD-ON: CPT

## 2018-11-10 PROCEDURE — 36415 COLL VENOUS BLD VENIPUNCTURE: CPT | Performed by: PHYSICIAN ASSISTANT

## 2018-11-10 PROCEDURE — 99283 EMERGENCY DEPT VISIT LOW MDM: CPT

## 2018-11-10 PROCEDURE — 96374 THER/PROPH/DIAG INJ IV PUSH: CPT

## 2018-11-10 PROCEDURE — 85027 COMPLETE CBC AUTOMATED: CPT | Performed by: PHYSICIAN ASSISTANT

## 2018-11-10 PROCEDURE — 86308 HETEROPHILE ANTIBODY SCREEN: CPT | Performed by: PHYSICIAN ASSISTANT

## 2018-11-10 PROCEDURE — 87070 CULTURE OTHR SPECIMN AEROBIC: CPT | Performed by: PHYSICIAN ASSISTANT

## 2018-11-10 PROCEDURE — 80053 COMPREHEN METABOLIC PANEL: CPT | Performed by: PHYSICIAN ASSISTANT

## 2018-11-10 PROCEDURE — 87430 STREP A AG IA: CPT | Performed by: PHYSICIAN ASSISTANT

## 2018-11-10 PROCEDURE — 85007 BL SMEAR W/DIFF WBC COUNT: CPT | Performed by: PHYSICIAN ASSISTANT

## 2018-11-10 RX ORDER — VALACYCLOVIR HYDROCHLORIDE 1 G/1
1000 TABLET, FILM COATED ORAL 3 TIMES DAILY
Qty: 21 TABLET | Refills: 0 | Status: SHIPPED | OUTPATIENT
Start: 2018-11-10 | End: 2019-01-23 | Stop reason: ALTCHOICE

## 2018-11-10 RX ORDER — IBUPROFEN 600 MG/1
600 TABLET ORAL EVERY 6 HOURS PRN
Qty: 30 TABLET | Refills: 0 | Status: SHIPPED | OUTPATIENT
Start: 2018-11-10 | End: 2018-12-05 | Stop reason: ALTCHOICE

## 2018-11-10 RX ORDER — SODIUM CHLORIDE 9 MG/ML
250 INJECTION, SOLUTION INTRAVENOUS CONTINUOUS
Status: DISCONTINUED | OUTPATIENT
Start: 2018-11-10 | End: 2018-11-10 | Stop reason: HOSPADM

## 2018-11-10 RX ORDER — KETOROLAC TROMETHAMINE 30 MG/ML
30 INJECTION, SOLUTION INTRAMUSCULAR; INTRAVENOUS ONCE
Status: COMPLETED | OUTPATIENT
Start: 2018-11-10 | End: 2018-11-10

## 2018-11-10 RX ORDER — METRONIDAZOLE 500 MG/1
500 TABLET ORAL EVERY 12 HOURS SCHEDULED
Qty: 14 TABLET | Refills: 0 | Status: SHIPPED | OUTPATIENT
Start: 2018-11-10 | End: 2018-11-15 | Stop reason: HOSPADM

## 2018-11-10 RX ADMIN — SODIUM CHLORIDE 250 ML/HR: 9 INJECTION, SOLUTION INTRAVENOUS at 18:54

## 2018-11-10 RX ADMIN — KETOROLAC TROMETHAMINE 30 MG: 30 INJECTION, SOLUTION INTRAMUSCULAR; INTRAVENOUS at 20:24

## 2018-11-10 NOTE — ED PROVIDER NOTES
History  Chief Complaint   Patient presents with    Mouth Lesions     Patient used toothpaste that she is allergic to saw dentist and wash given mouth wash  Patient has swollen open lip and mouth sores  History provided by:  Patient   used: No    Medical Problem   Location:  Pt with sore throat swollen lips and gums  pt seen here 5 days ago for pneumonia  seen by dentist 4 days ago   Severity:  Moderate  Onset quality:  Gradual  Duration:  5 days  Timing:  Constant  Progression:  Unchanged  Chronicity:  New  Context:  Pt is on amoxil at this time   Associated symptoms: no abdominal pain, no chest pain, no congestion, no cough, no diarrhea, no ear pain, no fatigue, no fever, no headaches, no loss of consciousness, no myalgias, no nausea, no rash, no rhinorrhea, no shortness of breath, no sore throat, no vomiting and no wheezing        Prior to Admission Medications   Prescriptions Last Dose Informant Patient Reported? Taking? albuterol (VENTOLIN HFA) 90 mcg/act inhaler   No No   Sig: Inhale 2 puffs every 4 (four) hours as needed for wheezing   amoxicillin (AMOXIL) 500 mg capsule   No No   Sig: Take 2 capsules (1,000 mg total) by mouth every 8 (eight) hours for 7 days   azithromycin (ZITHROMAX) 250 mg tablet   No No   Sig: Take 2 tablets today then 1 tablet daily x 4 days   brompheniramine-pseudoephedrine-DM 30-2-10 MG/5ML syrup   No No   Sig: Take 5 mL by mouth 4 (four) times a day as needed for cough   desogestrel-ethinyl estradiol (APRI) 0 15-30 MG-MCG per tablet  Self Yes No   Sig: every 24 hours   pentosan polysulfate (ELMIRON) 100 mg capsule  Self Yes No   Sig: 3 (three) times a day      Facility-Administered Medications: None       Past Medical History:   Diagnosis Date    Anemia     Asthma     Heart murmur        History reviewed  No pertinent surgical history  History reviewed  No pertinent family history    I have reviewed and agree with the history as documented  Social History   Substance Use Topics    Smoking status: Never Smoker    Smokeless tobacco: Never Used    Alcohol use No      Comment: rarely; about 3x a year        Review of Systems   Constitutional: Negative  Negative for fatigue and fever  HENT: Negative  Negative for congestion, ear pain, rhinorrhea and sore throat  Eyes: Negative  Respiratory: Negative  Negative for cough, shortness of breath and wheezing  Cardiovascular: Negative  Negative for chest pain  Gastrointestinal: Negative  Negative for abdominal pain, diarrhea, nausea and vomiting  Endocrine: Negative  Genitourinary: Negative  Musculoskeletal: Negative  Negative for myalgias  Skin: Negative  Negative for rash  Allergic/Immunologic: Negative  Neurological: Negative  Negative for loss of consciousness and headaches  Hematological: Negative  Psychiatric/Behavioral: Negative  All other systems reviewed and are negative  Physical Exam  Physical Exam   Constitutional: She is oriented to person, place, and time  She appears well-developed and well-nourished  HENT:   Head: Normocephalic  Right Ear: External ear normal    Exudative pharyngitis   Lip swelling  Gum erythema and swelling   Viral sores to tongue and bucchal mucosa    Eyes: Pupils are equal, round, and reactive to light  Conjunctivae and EOM are normal    Neck: Normal range of motion  Neck supple  Cardiovascular: Normal rate, regular rhythm and normal heart sounds  Pulmonary/Chest: Effort normal and breath sounds normal    Abdominal: Soft  Bowel sounds are normal    Musculoskeletal: Normal range of motion  Neurological: She is alert and oriented to person, place, and time  Skin: Skin is warm  Psychiatric: She has a normal mood and affect  Her behavior is normal    Nursing note and vitals reviewed        Vital Signs  ED Triage Vitals [11/10/18 1817]   Temperature Pulse Respirations Blood Pressure SpO2   97 6 °F (36 4 °C) (!) 110 18 126/75 95 %      Temp Source Heart Rate Source Patient Position - Orthostatic VS BP Location FiO2 (%)   Tympanic Monitor Sitting Right arm --      Pain Score       Worst Possible Pain           Vitals:    11/10/18 1817   BP: 126/75   Pulse: (!) 110   Patient Position - Orthostatic VS: Sitting       Visual Acuity      ED Medications  Medications   ketorolac (TORADOL) injection 30 mg (30 mg Intravenous Given 11/10/18 2024)       Diagnostic Studies  Results Reviewed     Procedure Component Value Units Date/Time    Rapid Strep A Screen Throat with Reflex to Culture, Pediatrics and Compromised Adults [[id number]]  (Normal) Collected:  11/10/18 1844    Lab Status:  Final result Specimen:  Throat from Throat Updated:  11/10/18 1914     Rapid Strep A Screen Negative    Throat culture [[id number]] Collected:  11/10/18 1844    Lab Status: In process Specimen:  Throat from Throat Updated:  11/10/18 1914    Comprehensive metabolic panel [[id number]]  (Abnormal) Collected:  11/10/18 1844    Lab Status:  Final result Specimen:  Blood from Arm, Left Updated:  11/10/18 1912     Sodium 138 mmol/L      Potassium 4 0 mmol/L      Chloride 103 mmol/L      CO2 23 mmol/L      ANION GAP 12 mmol/L      BUN 9 mg/dL      Creatinine 0 73 mg/dL      Glucose 82 mg/dL      Calcium 9 6 mg/dL      AST 31 U/L      ALT 22 U/L      Alkaline Phosphatase 46 U/L      Total Protein 8 6 (H) g/dL      Albumin 4 4 g/dL      Total Bilirubin 0 60 mg/dL      eGFR 132 ml/min/1 73sq m     Narrative:       Hemolysis  National Kidney Disease Education Program recommendations are as follows:  GFR calculation is accurate only with a steady state creatinine  Chronic Kidney disease less than 60 ml/min/1 73 sq  meters  Kidney failure less than 15 ml/min/1 73 sq  meters      CBC and differential [[id number]]  (Normal) Collected:  11/10/18 1844    Lab Status:  Final result Specimen:  Blood from Arm, Left Updated:  11/10/18 1904     WBC 5 60 Thousand/uL      RBC 4 56 Million/uL      Hemoglobin 13 7 g/dL      Hematocrit 41 2 %      MCV 90 fL      MCH 30 1 pg      MCHC 33 3 g/dL      RDW 12 7 %      MPV 9 7 fL      Platelets 916 Thousands/uL     Mononucleosis screen [[id number]] Collected:  11/10/18 1844    Lab Status: In process Specimen:  Blood from Arm, Left Updated:  11/10/18 1858                 No orders to display              Procedures  Procedures       Phone Contacts  ED Phone Contact    ED Course                               MDM  CritCare Time    Disposition  Final diagnoses:   Gingivostomatitis     Time reflects when diagnosis was documented in both MDM as applicable and the Disposition within this note     Time User Action Codes Description Comment    11/10/2018  8:10 PM Gia Wing Add [A22 59] Gingivostomatitis       ED Disposition     ED Disposition Condition Comment    Discharge  [facility] discharge to home/self care      Condition at discharge: Good        Follow-up Information     Follow up With Specialties Details Why [address]  Schedule an appointment as soon as possible for a visit  [address] [place] [address]  [phone]          Discharge Medication List as of 11/10/2018  8:12 PM      CONTINUE these medications which have NOT CHANGED    Details   albuterol (VENTOLIN HFA) 90 mcg/act inhaler Inhale 2 puffs every 4 (four) hours as needed for wheezing, Starting u 10/18/2018, Normal      amoxicillin (AMOXIL) 500 mg capsule Take 2 capsules (1,000 mg total) by mouth every 8 (eight) hours for 7 days, Starting Mon 11/5/2018, Until Mon 11/12/2018, Print      brompheniramine-pseudoephedrine-DM 30-2-10 MG/5ML syrup Take 5 mL by mouth 4 (four) times a day as needed for cough, Starting Thu 10/18/2018, Normal      desogestrel-ethinyl estradiol (APRI) 0 15-30 MG-MCG per tablet every 24 hours, Historical Med      pentosan polysulfate (ELMIRON) 100 mg capsule 3 (three) times a day, Historical Med STOP taking these medications       azithromycin (ZITHROMAX) 250 mg tablet Comments:   Reason for Stopping:             No discharge procedures on file      ED Provider  Electronically Signed by           Maribel Rossi PA-C  11/11/18 5956

## 2018-11-11 NOTE — DISCHARGE INSTRUCTIONS
Gingivostomatitis   AMBULATORY CARE:   Gingivostomatitis (GS)  is a condition that causes painful sores on the lips, tongue, gums, and inside the mouth  GS is caused by the herpes simplex virus  The virus spreads easily from person to person through saliva or shared objects  The sores usually heal within 2 weeks with treatment  Common signs and symptoms of GS:   · Sores, ulcers, or blisters in your mouth    · Irritated gums    · Sore throat    · Fever, headache, fatigue    · Nausea, vomiting, or swollen lymph glands  Seek care immediately if:   · You have severe pain  Contact your healthcare provider if:   · Your fever or other symptoms return after treatment  · You are urinating less than usual     · Your mouth sores are draining pus or blood  · You have questions or concerns about your condition or care  Treatment  may include any of the following:  · Acetaminophen  decreases pain and fever  It is available without a doctor's order  Ask how much to take and how often to take it  Follow directions  Acetaminophen can cause liver damage if not taken correctly  · NSAIDs , such as ibuprofen, help decrease swelling, pain, and fever  This medicine is available with or without a doctor's order  NSAIDs can cause stomach bleeding or kidney problems in certain people  If you take blood thinner medicine, always ask your healthcare provider if NSAIDs are safe for you  Always read the medicine label and follow directions  · Numbing medicine  helps decrease pain from your mouth sores  This medicine is usually a liquid that you swish in your mouth and then spit out  · Antiviral medicine  helps treat a viral infection  Manage your symptoms:   · Brush your teeth at least 2 times each day  Floss at least 1 time each day  If you wear dentures, make sure they fit properly  · Drink liquids as directed to prevent dehydration  It is important to drink liquids even though your mouth is sore   Ask how much liquid to drink each day and which liquids are best for you  · Eat a variety of healthy foods  You may need to eat bland foods until your pain gets better  Healthy foods include fruits, vegetables, whole-grain breads, low-fat dairy products, beans, lean meats, and fish  Do not eat spicy, dry, hard, or acidic foods, such as oranges  · Do not smoke  Nicotine and other chemicals in cigarettes and cigars can cause mouth and lung damage  Ask your healthcare provider for information if you currently smoke and need help to quit  E-cigarettes or smokeless tobacco still contain nicotine  Talk to your healthcare provider before you use these products  Follow up with your healthcare provider as directed:  Write down your questions so you remember to ask them during your visits  © 2017 [employer] [employer] Information is for End User's use only and may not be sold, redistributed or otherwise used for commercial purposes  All illustrations and images included in CareNotes® are the copyrighted property of [employer]  or [employer]  The above information is an  only  It is not intended as medical advice for individual conditions or treatments  Talk to your doctor, nurse or pharmacist before following any medical regimen to see if it is safe and effective for you

## 2018-11-12 LAB — HETEROPH AB SER QL: NEGATIVE

## 2018-11-13 LAB — BACTERIA THROAT CULT: NORMAL

## 2018-11-15 ENCOUNTER — OFFICE VISIT (OUTPATIENT)
Dept: FAMILY MEDICINE CLINIC | Facility: CLINIC | Age: 23
End: 2018-11-15
Payer: COMMERCIAL

## 2018-11-15 VITALS
BODY MASS INDEX: 25.82 KG/M2 | DIASTOLIC BLOOD PRESSURE: 72 MMHG | HEART RATE: 77 BPM | SYSTOLIC BLOOD PRESSURE: 110 MMHG | RESPIRATION RATE: 16 BRPM | TEMPERATURE: 96.8 F | WEIGHT: 160 LBS | OXYGEN SATURATION: 99 %

## 2018-11-15 DIAGNOSIS — K13.70 ORAL MUCOSAL LESION: Primary | ICD-10-CM

## 2018-11-15 PROCEDURE — 99213 OFFICE O/P EST LOW 20 MIN: CPT | Performed by: FAMILY MEDICINE

## 2018-11-15 PROCEDURE — 1036F TOBACCO NON-USER: CPT | Performed by: FAMILY MEDICINE

## 2018-11-15 RX ORDER — DIPHENHYDRAMINE HCL 12.5 MG/5ML
LIQUID ORAL
COMMUNITY
Start: 2018-11-07 | End: 2019-01-23 | Stop reason: ALTCHOICE

## 2018-11-15 NOTE — PROGRESS NOTES
Assessment/Plan:    Oral mucosal lesion  - DDx HSV v/s gingivostomatitis from allergic response to benzocaine  - Improving  - Complete course of Valtrex  - Stop metronidazole for concerns of dysgeusia/ nausea  - Stay well hydrated  - Advance diet as tolerated, avoid spicy food  - RTC if symptoms persist beyond a week or worsen     Diagnoses and all orders for this visit:    Oral mucosal lesion    Other orders  -     BANOPHEN 12 5 MG/5ML oral liquid;           Subjective:      Patient ID: Madhav Andersen is a 20 y o  female  20 y o female presents as acute visit for ER follow up of oral lesions  Onset approx on 11/4/2018  Was seen in ER on 11/5/2018 and prescribed Amox and azithromycin for PNA  Oral sx included numbness at that point  Subsequently developed swelling of lips with painful sores  Has tolerated Amox and Azithromycin in past  Had started using new toothpaste  Seen in dental clinic  Deemed allergic to the toothpaste  Given magic mouthwash for symptom relief  This aggravated her symptoms    Went back to ER on 11/10/2018  Deemed allergic to benzocaine  Also prescribed Valtrex and metronidazole for concerns of infection  No H/O cold sores  No fevers/ chills/ rhinorrhea/ congestion/ headache/ ear pain/ rash/ CP/ joint pain     Decreased appetite + Painful oral lesion + Altered taste sensation +        The following portions of the patient's history were reviewed and updated as appropriate: She  has a past medical history of Anemia; Asthma; and Heart murmur    Patient Active Problem List    Diagnosis Date Noted    Infection of skin due to methicillin resistant Staphylococcus aureus (MRSA) 09/27/2018    Overweight (BMI 25 0-29 9) 08/24/2018    Abdominal wall abscess 08/17/2018    Neurocardiogenic syncope 08/10/2018    Encounter for examination following treatment at hospital 08/10/2018    Fatigue 02/27/2018    Iron deficiency anemia 02/27/2018     She  has no past surgical history on file   Her family history is not on file  She  reports that she has never smoked  She has never used smokeless tobacco  She reports that she does not drink alcohol or use drugs  Current Outpatient Prescriptions   Medication Sig Dispense Refill    albuterol (VENTOLIN HFA) 90 mcg/act inhaler Inhale 2 puffs every 4 (four) hours as needed for wheezing 1 Inhaler 0    BANOPHEN 12 5 MG/5ML oral liquid       desogestrel-ethinyl estradiol (APRI) 0 15-30 MG-MCG per tablet every 24 hours      ibuprofen (MOTRIN) 600 mg tablet Take 1 tablet (600 mg total) by mouth every 6 (six) hours as needed (pain) 30 tablet 0    pentosan polysulfate (ELMIRON) 100 mg capsule 3 (three) times a day      valACYclovir (VALTREX) 1,000 mg tablet Take 1 tablet (1,000 mg total) by mouth 3 (three) times a day for 7 days 21 tablet 0     No current facility-administered medications for this visit  Current Outpatient Prescriptions on File Prior to Visit   Medication Sig    albuterol (VENTOLIN HFA) 90 mcg/act inhaler Inhale 2 puffs every 4 (four) hours as needed for wheezing    desogestrel-ethinyl estradiol (APRI) 0 15-30 MG-MCG per tablet every 24 hours    ibuprofen (MOTRIN) 600 mg tablet Take 1 tablet (600 mg total) by mouth every 6 (six) hours as needed (pain)    pentosan polysulfate (ELMIRON) 100 mg capsule 3 (three) times a day    valACYclovir (VALTREX) 1,000 mg tablet Take 1 tablet (1,000 mg total) by mouth 3 (three) times a day for 7 days    [DISCONTINUED] brompheniramine-pseudoephedrine-DM 30-2-10 MG/5ML syrup Take 5 mL by mouth 4 (four) times a day as needed for cough    [DISCONTINUED] metroNIDAZOLE (FLAGYL) 500 mg tablet Take 1 tablet (500 mg total) by mouth every 12 (twelve) hours for 7 days     No current facility-administered medications on file prior to visit  She is allergic to eggs or egg-derived products; peanut oil; baking soda-fluoride [sodium fluoride]; and benzocaine       Review of Systems   Constitutional: Positive for appetite change and unexpected weight change  Negative for chills and fever  HENT: Positive for dental problem and mouth sores  Negative for congestion, drooling, ear discharge, ear pain, facial swelling, hearing loss, nosebleeds, postnasal drip, rhinorrhea, sinus pain, sinus pressure, sneezing, sore throat, tinnitus, trouble swallowing and voice change  Eyes: Negative for visual disturbance  Respiratory: Negative for cough and shortness of breath  Cardiovascular: Negative for chest pain  Gastrointestinal: Positive for nausea  Negative for abdominal pain, diarrhea and vomiting  Genitourinary: Negative for hematuria  Musculoskeletal: Negative for arthralgias  Skin: Negative for rash  Allergic/Immunologic: Positive for food allergies  Neurological: Negative for headaches  Hematological: Negative for adenopathy  Objective:      /72 (BP Location: Left arm, Patient Position: Sitting, Cuff Size: Adult)   Pulse 77   Temp (!) 96 8 °F (36 °C) (Temporal)   Resp 16   Wt 72 6 kg (160 lb)   LMP 11/05/2018   SpO2 99%   BMI 25 82 kg/m²          Physical Exam   Constitutional: She is oriented to person, place, and time  She appears well-developed and well-nourished  No distress  HENT:   Head: Normocephalic and atraumatic  Right Ear: External ear normal    Left Ear: External ear normal    Nose: Nose normal    Mouth/Throat: Uvula is midline and oropharynx is clear and moist  Mucous membranes are not cyanotic  Oral lesions (On left  buccal mucosa shallow looking ulcer) present  No trismus in the jaw  No uvula swelling  No oropharyngeal exudate  Eyes: Right eye exhibits no discharge  Left eye exhibits no discharge  Neck: Normal range of motion  Cardiovascular: Normal rate, regular rhythm and normal heart sounds  Exam reveals no gallop and no friction rub  No murmur heard  Pulmonary/Chest: Effort normal  No respiratory distress  She has no wheezes   She has no rales    Abdominal: Soft  There is no tenderness  Musculoskeletal: Normal range of motion  Lymphadenopathy:     She has cervical adenopathy (Non tender, anterior cervical nodes, firm , multiple)  Neurological: She is alert and oriented to person, place, and time  Skin: Skin is warm  No rash noted  She is not diaphoretic  Vitals reviewed

## 2018-12-05 ENCOUNTER — OFFICE VISIT (OUTPATIENT)
Dept: CARDIOLOGY CLINIC | Facility: CLINIC | Age: 23
End: 2018-12-05
Payer: COMMERCIAL

## 2018-12-05 VITALS
HEIGHT: 66 IN | WEIGHT: 163.6 LBS | HEART RATE: 77 BPM | SYSTOLIC BLOOD PRESSURE: 90 MMHG | BODY MASS INDEX: 26.29 KG/M2 | DIASTOLIC BLOOD PRESSURE: 60 MMHG | OXYGEN SATURATION: 97 %

## 2018-12-05 DIAGNOSIS — R01.1 HEART MURMUR: Primary | ICD-10-CM

## 2018-12-05 DIAGNOSIS — R55 NEUROCARDIOGENIC SYNCOPE: ICD-10-CM

## 2018-12-05 PROCEDURE — 93000 ELECTROCARDIOGRAM COMPLETE: CPT | Performed by: INTERNAL MEDICINE

## 2018-12-05 PROCEDURE — 99214 OFFICE O/P EST MOD 30 MIN: CPT | Performed by: INTERNAL MEDICINE

## 2018-12-05 NOTE — PATIENT INSTRUCTIONS
DIAGNOSES:  1  History of vasovagal syncope  2  Recent pneumonia  3  Anemia  4  History of abdominal wall abscess, status post drainage   5  Asthma  6  Multiple allergies including to eggs and egg derived Products, peanut, sodium fluoride, benzocaine etc     Echocardiogram September 2018 showed normal left ventricular size and systolic function, normal diastolic function, no significant valvular abnormality, no pulmonary hypertension  CURRENT ECG:  Results for orders placed or performed in visit on 12/05/18   POCT ECG    Narrative    Sinus rhythm, HR 77 beats per min, normal axis and intervals, minimal early repolarization otherwise no significant ST T wave abnormalities  CARDIOLOGY ASSESSMENT & PLAN:  Neurocardiogenic syncope  Has had no recurrence of symptoms  Workup thus far has been suggestive of vasovagal syncope  Blood pressure is borderline low  Her advising the patient to stay well hydrated at all times and follow all instructions as provided at last visit including:  Avoid prolonged standing without moving  I have also educated her about  Physical counter pressure maneuvers  She can perform if she is feeling any aura  I do not see any indication to restrict her 's license at this time  However if she has any recurrence of this episodes or any other concerning symptoms then we will proceed with further workup

## 2018-12-05 NOTE — PROGRESS NOTES
Angela [address]    [address], [address]  [address]  Phone#  [phone]  Fax#  [phone]  *-*-*-*-*-*-*-*-*-*-*-*-*-*-*-*-*-*-*-*-*-*-*-*-*-*-*-*-*-*-*-*-*-*-*-*-*-*-*-*-*-*-*-*-*-*-*-*-*-*-*-*-*-*    Naveed Herb DATE: 12/05/18 9:29 AM    PATIENT NAME: Kunal Dceker   1995    [id number]  Age: 20 y o  Sex: female    AUTHOR: Monique Mcneal MD  PRIMARYCARE PHYSICIAN: Edison Dinero MD    DIAGNOSES:  1  History of vasovagal syncope  2  Recent pneumonia  3  Anemia  4  History of abdominal wall abscess, status post drainage   5  Asthma  6  Multiple allergies including to eggs and egg derived Products, peanut, sodium fluoride, benzocaine etc     Echocardiogram September 2018 showed normal left ventricular size and systolic function, normal diastolic function, no significant valvular abnormality, no pulmonary hypertension  CURRENT ECG:  Results for orders placed or performed in visit on 12/05/18   POCT ECG    Narrative    Sinus rhythm, HR 77 beats per min, normal axis and intervals, minimal early repolarization otherwise no significant ST T wave abnormalities  CARDIOLOGY ASSESSMENT & PLAN:  Neurocardiogenic syncope  Has had no recurrence of symptoms  Workup thus far has been suggestive of vasovagal syncope  Blood pressure is borderline low  Her advising the patient to stay well hydrated at all times and follow all instructions as provided at last visit including:  Avoid prolonged standing without moving  I have also educated her about  Physical counter pressure maneuvers  She can perform if she is feeling any aura  I do not see any indication to restrict her 's license at this time  However if she has any recurrence of this episodes or any other concerning symptoms then we will proceed with further workup        INTERVAL HISTORY & HISTORY OF PRESENT ILLNESS:  Kunal Decker is here for follow-up regarding her cardiac comorbidities which include:  History of recurrent syncope  She reports overall feeling well with no significant complaints currently  She has not yet had her driving license reinstated since her syncopal event  She says that she has been sending the medical clearance form which we had failed last time to the DM we but they continued to say that they have not received it  Meanwhile she has had no recurrence of syncope since last visit  She does report that she had multiple ER visits last month primary due to pneumonia and to allergies to benzocaine  She has some fatigue but has not had presyncope/syncope  REVIEW OF SYMPTOMS:    Positive for:  Fatigue but no presyncope/syncope  Negative for: All remaining as reviewed below and in HPI  SYSTEM SYMPTOMS REVIEWED:  General--weight change, fever, night sweats  Respirato  Cardiovascular--chest pain, syncope, dyspnea on exertion, edema, decline in exercise tolerance, claudication   Gastrointestinal--persistent vomiting, diarrhea, abdominal distention, blood in stool   Muscular or skeletal--joint pain or swelling   Neurologic--headaches, syncope, abnormal movement  Hematologic--history of easy bruising and bleeding   Endocrine--thyroid enlargement, heat or cold intolerance, polyuria   Psychiatric--anxiety, depression     *-*-*-*-*-*-*-*-*-*-*-*-*-*-*-*-*-*-*-*-*-*-*-*-*-*-*-*-*-*-*-*-*-*-*-*-*-*-*-*-*-*-*-*-*-*-*-*-*-*-*-*-*-*-  VITAL SIGNS:  Vitals:    12/05/18 0841   BP: 90/60   Pulse: 77   SpO2: 97%   Weight: 74 2 kg (163 lb 9 6 oz)   Height: 5' 6" (1 676 m)       *-*-*-*-*-*-*-*-*-*-*-*-*-*-*-*-*-*-*-*-*-*-*-*-*-*-*-*-*-*-*-*-*-*-*-*-*-*-*-*-*-*-*-*-*-*-*-*-*-*-*-*-*-*-  PHYSICAL EXAM:  General Appearance:    Alert, cooperative, no distress, appears stated age   Head, Eyes, ENT:    No obvious abnormality, moist mucous mebranes  Neck:   Supple, no carotid bruit or JVD   Back:     Symmetric, no curvature  Lungs:     Respirations unlabored   Clear to auscultation bilaterally,    Chest wall:    No tenderness or deformity   Heart:    Regular rate and rhythm, S1 and S2 normal, no murmur, rub  or gallop  Abdomen:     Soft, non-tender, No obvious masses, or organomegaly   Extremities:   Extremities normal, no cyanosis or edema    Skin:   Skin color, texture, turgor normal, no rashes or lesions     *-*-*-*-*-*-*-*-*-*-*-*-*-*-*-*-*-*-*-*-*-*-*-*-*-*-*-*-*-*-*-*-*-*-*-*-*-*-*-*-*-*-*-*-*-*-*-*-*-*-*-*-*-*-  CURRENT MEDICATION LIST:    Current Outpatient Prescriptions:     albuterol (VENTOLIN HFA) 90 mcg/act inhaler, Inhale 2 puffs every 4 (four) hours as needed for wheezing, Disp: 1 Inhaler, Rfl: 0    BANOPHEN 12 5 MG/5ML oral liquid, , Disp: , Rfl:     desogestrel-ethinyl estradiol (APRI) 0 15-30 MG-MCG per tablet, every 24 hours, Disp: , Rfl:     pentosan polysulfate (ELMIRON) 100 mg capsule, 3 (three) times a day, Disp: , Rfl:     valACYclovir (VALTREX) 1,000 mg tablet, Take 1 tablet (1,000 mg total) by mouth 3 (three) times a day for 7 days, Disp: 21 tablet, Rfl: 0    ALLERGIES:  Allergies   Allergen Reactions    Eggs Or Egg-Derived Products Anaphylaxis    Peanut Oil Anaphylaxis    Baking Soda-Fluoride [Sodium Fluoride] Swelling    Benzocaine Swelling       *-*-*-*-*-*-*-*-*-*-*-*-*-*-*-*-*-*-*-*-*-*-*-*-*-*-*-*-*-*-*-*-*-*-*-*-*-*-*-*-*-*-*-*-*-*-*-*-*-*-*-*-*-*-    LABORATORY DATA:  I have personally reviewed pertinent labs      Potassium   Date Value Ref Range Status   11/10/2018 4 0 3 6 - 5 0 mmol/L Final   08/10/2018 4 0 3 6 - 5 0 mmol/L Final     Chloride   Date Value Ref Range Status   11/10/2018 103 97 - 108 mmol/L Final   08/10/2018 104 97 - 108 mmol/L Final     CO2   Date Value Ref Range Status   11/10/2018 23 22 - 30 mmol/L Final   08/10/2018 25 22 - 30 mmol/L Final     BUN   Date Value Ref Range Status   11/10/2018 9 5 - 25 mg/dL Final   08/10/2018 12 5 - 25 mg/dL Final     Creatinine   Date Value Ref Range Status   11/10/2018 0 73 0 60 - 1 20 mg/dL Final Comment:     Standardized to IDMS reference method   08/10/2018 0 76 0 60 - 1 20 mg/dL Final     Comment:     Standardized to IDMS reference method     eGFR   Date Value Ref Range Status   11/10/2018 132 >60 ml/min/1 73sq m Final   08/10/2018 126 >60 ml/min/1 73sq m Final     Calcium   Date Value Ref Range Status   11/10/2018 9 6 8 4 - 10 2 mg/dL Final   08/10/2018 9 8 8 4 - 10 2 mg/dL Final     AST   Date Value Ref Range Status   11/10/2018 31 14 - 36 U/L Final     Comment:       Specimen collection should occur prior to Sulfasalazine administration due to the potential for falsely depressed results  08/10/2018 22 14 - 36 U/L Final     Comment:       Specimen collection should occur prior to Sulfasalazine administration due to the potential for falsely depressed results  ALT   Date Value Ref Range Status   11/10/2018 22 9 - 52 U/L Final     Comment:       Specimen collection should occur prior to Sulfasalazine administration due to the potential for falsely depressed results  08/10/2018 20 9 - 52 U/L Final     Comment:       Specimen collection should occur prior to Sulfasalazine administration due to the potential for falsely depressed results        Alkaline Phosphatase   Date Value Ref Range Status   11/10/2018 46 43 - 122 U/L Final   08/10/2018 48 43 - 122 U/L Final     Magnesium   Date Value Ref Range Status   08/10/2018 2 2 1 6 - 2 3 mg/dL Final     WBC   Date Value Ref Range Status   11/10/2018 5 60 4 50 - 11 00 Thousand/uL Final   08/10/2018 5 10 4 50 - 11 00 Thousand/uL Final     Hemoglobin   Date Value Ref Range Status   11/10/2018 13 7 12 0 - 16 0 g/dL Final   08/10/2018 13 8 12 0 - 16 0 g/dL Final     Platelets   Date Value Ref Range Status   11/10/2018 245 150 - 450 Thousands/uL Final   08/10/2018 221 150 - 450 Thousands/uL Final     No results found for: PT, PTT, INR  No results found for: CKMB, BNP, DIGOXIN  No results found for: TSH  No results found for: CHOL, HDL, LDL, TRIG   No results found for: HGBA1C  Gram Stain Result   Date Value Ref Range Status   2018 No Polys or Bacteria seen  Final     Wound Culture   Date Value Ref Range Status   2018 3 colonies Staphylococcus aureus (A)  Final       *-*-*-*-*-*-*-*-*-*-*-*-*-*-*-*-*-*-*-*-*-*-*-*-*-*-*-*-*-*-*-*-*-*-*-*-*-*-*-*-*-*-*-*-*-*-*-*-*-*-*-*-*-*-  PREVIOUS CARDIOLOGY & RADIOLOGY RESULTS:  Results for orders placed during the hospital encounter of 18   Echo complete with contrast if indicated    [facility]  [place], [address]  [phone]    Transthoracic Echocardiogram  2D, M-mode, Doppler, and Color Doppler    Study date:  13-Sep-2018    Patient: Taisha Lancaster  MR number: [id number]  Account number: [de-identified]  : 1995  Age: 20 years  Gender: Female  Status: Outpatient  Location: [address] [place] [address]  Height: 66 in  Weight: 170 lb  BP: 118/ 66 mmHg    Indications: Syncope    Diagnoses: R55  - Syncope and collapse    Sonographer:  KATELYNN Lloyd  Primary Physician:  Mahala Moritz, MD  Referring Physician:  Mahala Moritz, MD  Group:  [facility]  Interpreting Physician:  Rossy Rousseau MD    SUMMARY    LEFT VENTRICLE:  Systolic function was normal  Ejection fraction was estimated to be 60 %  There were no regional wall motion abnormalities  MITRAL VALVE:  There was trace regurgitation  TRICUSPID VALVE:  There was mild regurgitation  PULMONIC VALVE:  There was mild to moderate regurgitation  HISTORY: PRIOR HISTORY: Seizures    PROCEDURE: The study was performed in the [address] [place] [address]  This was a routine study  The transthoracic approach was used  The study included complete 2D imaging, M-mode, complete spectral Doppler, and color Doppler  The  heart rate was 89 bpm, at the start of the study   Images were obtained from the parasternal, apical, subcostal, and suprasternal notch acoustic windows  Image quality was adequate  LEFT VENTRICLE: Size was normal  Systolic function was normal  Ejection fraction was estimated to be 60 %  There were no regional wall motion abnormalities  Wall thickness was normal  DOPPLER: The transmitral flow pattern was normal     RIGHT VENTRICLE: The size was normal  Systolic function was normal  Wall thickness was normal     LEFT ATRIUM: Size was normal     RIGHT ATRIUM: Size was normal     MITRAL VALVE: Valve structure was normal  There was normal leaflet separation  DOPPLER: The transmitral velocity was within the normal range  There was no evidence for stenosis  There was trace regurgitation  AORTIC VALVE: The valve was trileaflet  Leaflets exhibited normal thickness and normal cuspal separation  DOPPLER: Transaortic velocity was within the normal range  There was no evidence for stenosis  There was no regurgitation  TRICUSPID VALVE: The valve structure was normal  There was normal leaflet separation  DOPPLER: The transtricuspid velocity was within the normal range  There was no evidence for stenosis  There was mild regurgitation  Pulmonary artery  systolic pressure was within the normal range  Estimated peak PA pressure was 25 mmHg  PULMONIC VALVE: Leaflets exhibited normal thickness, no calcification, and normal cuspal separation  DOPPLER: The transpulmonic velocity was within the normal range  There was mild to moderate regurgitation  PERICARDIUM: There was no pericardial effusion  The pericardium was normal in appearance  AORTA: The root exhibited normal size  SYSTEMIC VEINS: IVC: The inferior vena cava was not well visualized      SYSTEM MEASUREMENT TABLES    2D  Ao Diam: 2 7 cm  IVSd: 0 8 cm  LA Diam: 3 1 cm  LVIDd: 5 cm  LVIDs: 3 5 cm  LVPWd: 0 8 cm    CW  TR Vmax: 2 3 m/s  TR maxP 4 mmHg    PW  E': 0 1 m/s  E/E': 9 1  MV A [place]: 0 7 m/s  MV Dec [place]: 6 9 m/s2  MV DecT: 179 3 ms  MV E [place]: 1 2 m/s  MV E/A Ratio: 1 8    [employer] Accredited Echocardiography Laboratory    Prepared and electronically signed by    Radha Cummins MD  Signed 14-Sep-2018 10:55:23       No results found for this or any previous visit  No results found for this or any previous visit  No results found for this or any previous visit  No results found  *-*-*-*-*-*-*-*-*-*-*-*-*-*-*-*-*-*-*-*-*-*-*-*-*-*-*-*-*-*-*-*-*-*-*-*-*-*-*-*-*-*-*-*-*-*-*-*-*-*-*-*-*-*-  SIGNATURES:   @YWD@   Ruth Dougherty MD     CC:   MD Maria Victoria Alegre MD   *-*-*-*-*-*-*-*-*-*-*-*-*-*-*-*-*-*-*-*-*-*-*-*-*-*-*-*-*-*-*-*-*-*-*-*-*-*-*-*-*-*-*-*-*-*-*-*-*-*-*-*-*-*-    Social History     Social History    Marital status: Single     Spouse name: N/A    Number of children: N/A    Years of education: N/A     Occupational History    Not on file  Social History Main Topics    Smoking status: Never Smoker    Smokeless tobacco: Never Used    Alcohol use No      Comment: rarely; about 3x a year    Drug use: No    Sexual activity: Not on file     Other Topics Concern    Not on file     Social History Narrative    No narrative on file      No family history on file  No past surgical history on file

## 2018-12-05 NOTE — ASSESSMENT & PLAN NOTE
Has had no recurrence of symptoms  Workup thus far has been suggestive of vasovagal syncope  Blood pressure is borderline low  Her advising the patient to stay well hydrated at all times and follow all instructions as provided at last visit including:  Avoid prolonged standing without moving  I have also educated her about  Physical counter pressure maneuvers  She can perform if she is feeling any aura  I do not see any indication to restrict her 's license at this time  However if she has any recurrence of this episodes or any other concerning symptoms then we will proceed with further workup

## 2018-12-14 ENCOUNTER — OFFICE VISIT (OUTPATIENT)
Dept: FAMILY MEDICINE CLINIC | Facility: CLINIC | Age: 23
End: 2018-12-14
Payer: COMMERCIAL

## 2018-12-14 VITALS
DIASTOLIC BLOOD PRESSURE: 60 MMHG | HEART RATE: 78 BPM | SYSTOLIC BLOOD PRESSURE: 140 MMHG | OXYGEN SATURATION: 99 % | RESPIRATION RATE: 16 BRPM | HEIGHT: 66 IN | WEIGHT: 163 LBS | BODY MASS INDEX: 26.2 KG/M2 | TEMPERATURE: 96.2 F

## 2018-12-14 DIAGNOSIS — R55 NEUROCARDIOGENIC SYNCOPE: ICD-10-CM

## 2018-12-14 DIAGNOSIS — Z09 FOLLOW UP: Primary | ICD-10-CM

## 2018-12-14 DIAGNOSIS — Z23 NEED FOR VACCINATION: ICD-10-CM

## 2018-12-14 PROCEDURE — 90715 TDAP VACCINE 7 YRS/> IM: CPT | Performed by: FAMILY MEDICINE

## 2018-12-14 PROCEDURE — 90471 IMMUNIZATION ADMIN: CPT | Performed by: FAMILY MEDICINE

## 2018-12-14 PROCEDURE — 99213 OFFICE O/P EST LOW 20 MIN: CPT | Performed by: FAMILY MEDICINE

## 2018-12-14 NOTE — ASSESSMENT & PLAN NOTE
has had no recurrence of symptoms  Workup thus far has been suggestive of vasovagal syncope  Blood pressure normal,      Advise patient the patient to stay well hydrated at all times  Reeducated patient on avoid prolonged standing without moving and Physical counter pressure maneuvers  Currently her license is restricted her 's license at this time  Cardiology is in the process of reversing the  license restriction  Will need to make an appointment with Neurology for evaluation

## 2019-01-08 ENCOUNTER — APPOINTMENT (EMERGENCY)
Dept: ULTRASOUND IMAGING | Facility: HOSPITAL | Age: 24
End: 2019-01-08
Payer: COMMERCIAL

## 2019-01-08 ENCOUNTER — HOSPITAL ENCOUNTER (EMERGENCY)
Facility: HOSPITAL | Age: 24
Discharge: HOME | End: 2019-01-08
Attending: EMERGENCY MEDICINE | Admitting: EMERGENCY MEDICINE
Payer: COMMERCIAL

## 2019-01-08 VITALS
WEIGHT: 161.13 LBS | DIASTOLIC BLOOD PRESSURE: 71 MMHG | TEMPERATURE: 99.3 F | OXYGEN SATURATION: 99 % | HEART RATE: 76 BPM | BODY MASS INDEX: 26.01 KG/M2 | RESPIRATION RATE: 20 BRPM | SYSTOLIC BLOOD PRESSURE: 119 MMHG

## 2019-01-08 DIAGNOSIS — R10.9 ABDOMINAL PAIN: Primary | ICD-10-CM

## 2019-01-08 LAB
ALBUMIN SERPL BCP-MCNC: 4 G/DL (ref 3–5.2)
ALP SERPL-CCNC: 44 U/L (ref 43–122)
ALT SERPL W P-5'-P-CCNC: 19 U/L (ref 9–52)
ANION GAP SERPL CALCULATED.3IONS-SCNC: 9 MMOL/L (ref 5–14)
AST SERPL W P-5'-P-CCNC: 22 U/L (ref 14–36)
BILIRUB SERPL-MCNC: 0.3 MG/DL
BUN SERPL-MCNC: 9 MG/DL (ref 5–25)
CALCIUM SERPL-MCNC: 9.3 MG/DL (ref 8.4–10.2)
CHLORIDE SERPL-SCNC: 105 MMOL/L (ref 97–108)
CO2 SERPL-SCNC: 22 MMOL/L (ref 22–30)
CREAT SERPL-MCNC: 0.64 MG/DL (ref 0.6–1.2)
ERYTHROCYTE [DISTWIDTH] IN BLOOD BY AUTOMATED COUNT: 14 %
GFR SERPL CREATININE-BSD FRML MDRD: 143 ML/MIN/1.73SQ M
GLUCOSE SERPL-MCNC: 82 MG/DL (ref 70–99)
HCT VFR BLD AUTO: 38.6 % (ref 36–46)
HGB BLD-MCNC: 12.5 G/DL (ref 12–16)
LIPASE SERPL-CCNC: 62 U/L (ref 23–300)
LYMPHOCYTES # BLD AUTO: 0.97 THOUSAND/UL (ref 0.5–4)
LYMPHOCYTES # BLD AUTO: 21 % (ref 25–45)
MCH RBC QN AUTO: 29.7 PG (ref 26–34)
MCHC RBC AUTO-ENTMCNC: 32.2 G/DL (ref 31–36)
MCV RBC AUTO: 92 FL (ref 80–100)
MONOCYTES # BLD AUTO: 0.46 THOUSAND/UL (ref 0.2–0.9)
MONOCYTES NFR BLD AUTO: 10 % (ref 1–10)
NEUTS SEG # BLD: 3.17 THOUSAND/UL (ref 1.8–7.8)
NEUTS SEG NFR BLD AUTO: 69 %
PLATELET # BLD AUTO: 198 THOUSANDS/UL (ref 150–450)
PLATELET BLD QL SMEAR: ADEQUATE
PMV BLD AUTO: 9.8 FL (ref 8.9–12.7)
POTASSIUM SERPL-SCNC: 3.8 MMOL/L (ref 3.6–5)
PROT SERPL-MCNC: 7.4 G/DL (ref 5.9–8.4)
RBC # BLD AUTO: 4.19 MILLION/UL (ref 4–5.2)
RBC MORPH BLD: NORMAL
SODIUM SERPL-SCNC: 136 MMOL/L (ref 137–147)
TOTAL CELLS COUNTED SPEC: 100
WBC # BLD AUTO: 4.6 THOUSAND/UL (ref 4.5–11)

## 2019-01-08 PROCEDURE — 36415 COLL VENOUS BLD VENIPUNCTURE: CPT | Performed by: PHYSICIAN ASSISTANT

## 2019-01-08 PROCEDURE — 76705 ECHO EXAM OF ABDOMEN: CPT

## 2019-01-08 PROCEDURE — 99285 EMERGENCY DEPT VISIT HI MDM: CPT

## 2019-01-08 PROCEDURE — 85007 BL SMEAR W/DIFF WBC COUNT: CPT | Performed by: PHYSICIAN ASSISTANT

## 2019-01-08 PROCEDURE — 96361 HYDRATE IV INFUSION ADD-ON: CPT

## 2019-01-08 PROCEDURE — 85027 COMPLETE CBC AUTOMATED: CPT | Performed by: PHYSICIAN ASSISTANT

## 2019-01-08 PROCEDURE — 96374 THER/PROPH/DIAG INJ IV PUSH: CPT

## 2019-01-08 PROCEDURE — 83690 ASSAY OF LIPASE: CPT | Performed by: PHYSICIAN ASSISTANT

## 2019-01-08 PROCEDURE — 80053 COMPREHEN METABOLIC PANEL: CPT | Performed by: PHYSICIAN ASSISTANT

## 2019-01-08 RX ORDER — ONDANSETRON 2 MG/ML
4 INJECTION INTRAMUSCULAR; INTRAVENOUS ONCE
Status: COMPLETED | OUTPATIENT
Start: 2019-01-08 | End: 2019-01-08

## 2019-01-08 RX ORDER — ONDANSETRON 4 MG/1
4 TABLET, FILM COATED ORAL EVERY 6 HOURS
Qty: 15 TABLET | Refills: 0 | Status: SHIPPED | OUTPATIENT
Start: 2019-01-08 | End: 2019-04-25 | Stop reason: ALTCHOICE

## 2019-01-08 RX ORDER — DICYCLOMINE HCL 20 MG
20 TABLET ORAL 2 TIMES DAILY
Qty: 20 TABLET | Refills: 0 | Status: SHIPPED | OUTPATIENT
Start: 2019-01-08 | End: 2019-04-25 | Stop reason: ALTCHOICE

## 2019-01-08 RX ADMIN — ONDANSETRON HYDROCHLORIDE 4 MG: 2 INJECTION, SOLUTION INTRAMUSCULAR; INTRAVENOUS at 12:35

## 2019-01-08 RX ADMIN — SODIUM CHLORIDE 1000 ML: 0.9 INJECTION, SOLUTION INTRAVENOUS at 12:35

## 2019-01-08 NOTE — ED NOTES
PA aware that pt has not given urine sample yet, pt to be discharged     Carlee Cordon, MATTHEW  01/08/19 2709

## 2019-01-08 NOTE — DISCHARGE INSTRUCTIONS

## 2019-01-08 NOTE — ED PROVIDER NOTES
History  Chief Complaint   Patient presents with    Abdominal Pain     "I have severe left upper quadrant that goes into my chest"  Denies fevers or chills   Chest Pain    Vomiting     4 episodes    Diarrhea     Patient is a previously healthy 61-year-old female who presents today with a chief complaint of left upper quadrant abdominal pain  Patient reports this pain has been present constantly for 2 days and has been associated with anorexia, nausea, vomiting and 1 episode of diarrhea  Patient reports she has been unable to tolerate any oral intake for the past 2 days  Patient reports she tried Tylenol with no relief for the pain  Patient reports she has had this pain in the past over the past year however has been relieved in the past   Patient reports that today the pain is constant and has not been relieved by anything she has tried  Patient reports the pain is aching in nature and does travel to the epigastric area  Patient denies fevers or chills at home  History provided by:  Patient   used: No    Abdominal Pain   Pain location:  LUQ and epigastric  Pain quality: aching    Pain radiates to:  Epigastric region  Pain severity:  Moderate  Onset quality:  Gradual  Duration:  2 days  Timing:  Constant  Progression:  Worsening  Chronicity:  Recurrent  Relieved by:  Nothing  Worsened by:  Eating  Ineffective treatments:  Acetaminophen  Associated symptoms: diarrhea    Associated symptoms: no chest pain, no chills, no constipation, no dysuria, no fatigue, no fever, no hematuria, no nausea, no shortness of breath, no sore throat and no vomiting    Vomiting   Associated symptoms: abdominal pain and diarrhea    Associated symptoms: no chills, no fever and no sore throat    Diarrhea   Associated symptoms: abdominal pain    Associated symptoms: no chills, no fever and no vomiting        Prior to Admission Medications   Prescriptions Last Dose Informant Patient Reported? Taking? BANOPHEN 12 5 MG/5ML oral liquid  Self Yes No   albuterol (VENTOLIN HFA) 90 mcg/act inhaler  Self No No   Sig: Inhale 2 puffs every 4 (four) hours as needed for wheezing   desogestrel-ethinyl estradiol (APRI) 0 15-30 MG-MCG per tablet  Self Yes No   Sig: every 24 hours   pentosan polysulfate (ELMIRON) 100 mg capsule  Self Yes No   Sig: 3 (three) times a day   valACYclovir (VALTREX) 1,000 mg tablet   No No   Sig: Take 1 tablet (1,000 mg total) by mouth 3 (three) times a day for 7 days      Facility-Administered Medications: None       Past Medical History:   Diagnosis Date    Anemia     Asthma     Heart murmur        History reviewed  No pertinent surgical history  History reviewed  No pertinent family history  I have reviewed and agree with the history as documented  Social History   Substance Use Topics    Smoking status: Never Smoker    Smokeless tobacco: Never Used    Alcohol use Yes      Comment: rarely; about 3x a year        Review of Systems   Constitutional: Negative for chills, fatigue and fever  HENT: Negative for congestion, ear pain, rhinorrhea and sore throat  Eyes: Negative for redness  Respiratory: Negative for chest tightness and shortness of breath  Cardiovascular: Negative for chest pain and palpitations  Gastrointestinal: Positive for abdominal pain and diarrhea  Negative for abdominal distention, blood in stool, constipation, nausea and vomiting  Genitourinary: Negative for dysuria and hematuria  Musculoskeletal: Negative  Skin: Negative for rash  Neurological: Negative for dizziness, syncope, light-headedness and numbness  Physical Exam  Physical Exam   Constitutional: She is oriented to person, place, and time  She appears well-developed and well-nourished  HENT:   Head: Normocephalic  Eyes: No scleral icterus  Cardiovascular: Normal rate and regular rhythm  Pulmonary/Chest: Effort normal and breath sounds normal  No stridor  Abdominal: Soft  Normal appearance and bowel sounds are normal  She exhibits no distension  There is no hepatosplenomegaly  There is tenderness  There is no rigidity, no rebound, no guarding, no CVA tenderness, no tenderness at McBurney's point and negative Lyle's sign  Musculoskeletal: Normal range of motion  Neurological: She is alert and oriented to person, place, and time  Skin: Skin is warm and dry  Capillary refill takes less than 2 seconds  Psychiatric: She has a normal mood and affect  Nursing note and vitals reviewed  Vital Signs  ED Triage Vitals [01/08/19 1145]   Temperature Pulse Respirations Blood Pressure SpO2   99 3 °F (37 4 °C) 71 14 109/60 100 %      Temp Source Heart Rate Source Patient Position - Orthostatic VS BP Location FiO2 (%)   Tympanic -- -- -- --      Pain Score       --           Vitals:    01/08/19 1145   BP: 109/60   Pulse: 71       Visual Acuity      ED Medications  Medications   sodium chloride 0 9 % bolus 1,000 mL (1,000 mL Intravenous New Bag 1/8/19 1235)   ondansetron (ZOFRAN) injection 4 mg (4 mg Intravenous Given 1/8/19 1235)       Diagnostic Studies  Results Reviewed     Procedure Component Value Units Date/Time    Comprehensive metabolic panel [[id number]]  (Abnormal) Collected:  01/08/19 1226    Lab Status:  Final result Specimen:  Blood from Arm, Right Updated:  01/08/19 1255     Sodium 136 (L) mmol/L      Potassium 3 8 mmol/L      Chloride 105 mmol/L      CO2 22 mmol/L      ANION GAP 9 mmol/L      BUN 9 mg/dL      Creatinine 0 64 mg/dL      Glucose 82 mg/dL      Calcium 9 3 mg/dL      AST 22 U/L      ALT 19 U/L      Alkaline Phosphatase 44 U/L      Total Protein 7 4 g/dL      Albumin 4 0 g/dL      Total Bilirubin 0 30 mg/dL      eGFR 143 ml/min/1 73sq m     Narrative:         National Kidney Disease Education Program recommendations are as follows:  GFR calculation is accurate only with a steady state creatinine  Chronic Kidney disease less than 60 ml/min/1 73 sq  meters  Kidney failure less than 15 ml/min/1 73 sq  meters  Lipase [[id number]]  (Normal) Collected:  01/08/19 1226    Lab Status:  Final result Specimen:  Blood from Arm, Right Updated:  01/08/19 1254     Lipase 62 u/L     CBC and differential [[id number]]  (Normal) Collected:  01/08/19 1226    Lab Status:  Final result Specimen:  Blood from Arm, Right Updated:  01/08/19 1242     WBC 4 60 Thousand/uL      RBC 4 19 Million/uL      Hemoglobin 12 5 g/dL      Hematocrit 38 6 %      MCV 92 fL      MCH 29 7 pg      MCHC 32 2 g/dL      RDW 14 0 %      MPV 9 8 fL      Platelets 924 Thousands/uL     UA w Reflex to Microscopic w Reflex to Culture [[id number]]     Lab Status:  No result Specimen:  Urine                  US gallbladder   Final Result by Jose Swann MD (01/08 1312)      Normal       Workstation performed: [id number]                    Procedures  Procedures       Phone Contacts  ED Phone Contact    ED Course                               MDM  Number of Diagnoses or Management Options  Diagnosis management comments: Differential diagnosis includes but is not limited to: pancreatitis, choleycystitis, choleydocholithiasis, Gastric ulcer, Peptic Ulcer, Gastroenteritis, UTI  Will check CBC, CMP, Lipase, UA, gallbladder ultrasound, give zofran and IV fluids and re-evaluate  All results are negative at this time will discuss discharge with close follow up and strict ER Return symptoms         Amount and/or Complexity of Data Reviewed  Clinical lab tests: ordered and reviewed  Tests in the radiology section of CPT®: ordered      CritCare Time    Disposition  Final diagnoses:   Abdominal pain     Time reflects when diagnosis was documented in both MDM as applicable and the Disposition within this note     Time User Action Codes Description Comment    1/8/2019  1:30 PM Sophia Emmanuel [R10 9] Abdominal pain       ED Disposition     ED Disposition Condition Comment    Discharge  Sewickley Cecilia discharge to home/self care  Condition at discharge: Good        Follow-up Information     Follow up With Specialties Details Why Contact Info Almeta Pallas, DO Family Medicine Schedule an appointment as soon as possible for a visit  [address] [place] [address]  [phone]            Patient's Medications   Discharge Prescriptions    DICYCLOMINE (BENTYL) 20 MG TABLET    Take 1 tablet (20 mg total) by mouth 2 (two) times a day       Start Date: 1/8/2019  End Date: --       Order Dose: 20 mg       Quantity: 20 tablet    Refills: 0    ONDANSETRON (ZOFRAN) 4 MG TABLET    Take 1 tablet (4 mg total) by mouth every 6 (six) hours       Start Date: 1/8/2019  End Date: --       Order Dose: 4 mg       Quantity: 15 tablet    Refills: 0     No discharge procedures on file      ED Provider  Electronically Signed by           Roshni Banks PA-C  01/08/19 0709

## 2019-01-23 ENCOUNTER — OFFICE VISIT (OUTPATIENT)
Dept: NEUROLOGY | Facility: CLINIC | Age: 24
End: 2019-01-23
Payer: COMMERCIAL

## 2019-01-23 VITALS
BODY MASS INDEX: 25.43 KG/M2 | DIASTOLIC BLOOD PRESSURE: 78 MMHG | RESPIRATION RATE: 18 BRPM | HEART RATE: 103 BPM | HEIGHT: 67 IN | WEIGHT: 162 LBS | SYSTOLIC BLOOD PRESSURE: 102 MMHG

## 2019-01-23 DIAGNOSIS — R55 NEUROCARDIOGENIC SYNCOPE: ICD-10-CM

## 2019-01-23 PROCEDURE — 99243 OFF/OP CNSLTJ NEW/EST LOW 30: CPT | Performed by: PSYCHIATRY & NEUROLOGY

## 2019-01-23 NOTE — PROGRESS NOTES
Patient is here today as a new patient for syncope    Patient ID: Lavonne Shirley is a 20 y o  female  Assessment/Plan:    Neurocardiogenic syncope  History certainly consistent with syncopal (vasovagal) events  Evaluation through Cardiology supporting that diagnosis  However, I would like to undertake some additional to further secure diagnosis  --sleep-deprived EEG   --CT brain  --she will continue to follow those measures as outlined through Cardiology  She will follow up after completion of her additional studies  Subjective:    HPI  Patient, a 20years of age and right-handed, presents for episodes of loss of consciousness  She reports that she began having episodes during her mid teen years and the episodes were more frequent during her pregnancy  Her last episode occurred in August of this past year  The events she describes are fairly stereotyped  She begins with a feeling of being overheated followed by a sense of lightheadedness and subsequent loss of consciousness, although on occasion in the past was still aware of the environment     These apparently have been brief with no associated bowel or bladder control loss and no associated tongue bite with an immediate return to full level of awareness  Observers have not reported any associated adventitious motor activity  She has been evaluated by Cardiology who feel that the episodes are most likely vasovagal in origin (neurocardiogenic syncope if you will)  She has been instructed in several measures to avoid her situation from recurring including maintaining adequate hydration  She denies having had any syncopal or near syncopal episodes since her early August episode  Cardiology has cleared her for driving  Past Medical History:   Diagnosis Date    Anemia     Asthma     Heart murmur      History reviewed  No pertinent surgical history    Social History     Social History    Marital status: Single     Spouse name: N/A  Number of children: N/A    Years of education: N/A     Social History Main Topics    Smoking status: Never Smoker    Smokeless tobacco: Never Used    Alcohol use Yes      Comment: rarely; about 3x a year    Drug use: No    Sexual activity: Not Asked     Other Topics Concern    None     Social History Narrative    None     History reviewed  No pertinent family history  Allergies   Allergen Reactions    Eggs Or Egg-Derived Products Anaphylaxis    Peanut Oil Anaphylaxis    Baking Soda-Fluoride [Sodium Fluoride] Swelling    Benzocaine Swelling       Current Outpatient Prescriptions:     albuterol (VENTOLIN HFA) 90 mcg/act inhaler, Inhale 2 puffs every 4 (four) hours as needed for wheezing, Disp: 1 Inhaler, Rfl: 0    desogestrel-ethinyl estradiol (APRI) 0 15-30 MG-MCG per tablet, every 24 hours, Disp: , Rfl:     pentosan polysulfate (ELMIRON) 100 mg capsule, 3 (three) times a day, Disp: , Rfl:     dicyclomine (BENTYL) 20 mg tablet, Take 1 tablet (20 mg total) by mouth 2 (two) times a day (Patient not taking: Reported on 1/23/2019 ), Disp: 20 tablet, Rfl: 0    ondansetron (ZOFRAN) 4 mg tablet, Take 1 tablet (4 mg total) by mouth every 6 (six) hours (Patient not taking: Reported on 1/23/2019 ), Disp: 15 tablet, Rfl: 0    Objective:    Blood pressure 102/78, pulse 103, resp  rate 18, height 5' 6 5" (1 689 m), weight 73 5 kg (162 lb), last menstrual period 12/10/2018, not currently breastfeeding  Physical Exam  Blood pressure recumbent 110/68 with pulse 72  Blood pressure sitting 102/68 with pulse 80  Blood pressure standing one await over 70 with pulse 97  Head normocephalic  Eyes nonicteric  No audible head or anterior neck bruits  Lungs clear to auscultation  Rhythm regular  GI (abdomen) soft nontender with bowel sounds present  No lower extremity edema  Neurological Exam  Alert  Pleasantly interactive  Fully oriented  Unremarkable spontaneous gait  Able to tandem walk    Romberg maneuver performed unremarkably  Cranial Nerves:   I: Olfactory sense intact bilaterally  II: Visual fields full to confrontation  Pupils equal, round, reactive to light with normal accomodation  Fundus: with bilaterally marginated discs  III,IV,VI: Extraocular muscles EOMI, no nystagmus  V: Masseter and pterygoid strength  Sensation in the V1 through V3 distributions intact to pinprick and light touch bilaterally  VII: Face is symmetric with no weakness noted  VIII: Audition intact to finger rub bilaterally  IX/X: Uvula midline  Soft palate elevation symmetric  XI: Trapezius and SCM strength 5/5 bilaterally  XII: Tongue midline with no atrophy or fasciculations with appropriate movement  Accurate with finger-to-nose and heel-to-shin maneuvers bilaterally  Full symmetrical strength throughout the four extremities with no upper extremity drift  Sensory testing grossly intact to pin, position and vibration throughout  Muscle stretch reflexes bilaterally 1 throughout the upper extremities and at the knees and bilaterally 2 at the ankles  Toe response downgoing bilaterally  ROS:    Review of Systems   Constitutional: Negative  Negative for appetite change and fever  HENT: Positive for tinnitus (right ear)  Negative for hearing loss, trouble swallowing and voice change  Eyes: Negative  Negative for photophobia and pain  Respiratory: Positive for shortness of breath  With walking and if the weather is to cold   Cardiovascular: Negative  Negative for palpitations  Gastrointestinal: Negative  Negative for nausea and vomiting  Endocrine: Negative  Negative for cold intolerance and heat intolerance  Genitourinary: Positive for frequency and urgency  Negative for dysuria  Musculoskeletal: Positive for back pain (whole back)  Negative for myalgias and neck pain  Skin: Negative  Negative for rash  Neurological: Positive for headaches (migraine's)   Negative for dizziness, tremors, seizures, syncope, facial asymmetry, speech difficulty, weakness, light-headedness and numbness  Pulsating pain in head   Hematological: Negative  Does not bruise/bleed easily  Psychiatric/Behavioral: Negative  Negative for confusion, hallucinations and sleep disturbance  I personally reviewed the ROS that was entered by the medical assistant  *Please note this document was created using voice recognition software and may contain sound-alike word errors  *

## 2019-01-23 NOTE — LETTER
January 23, 2019     Anne Dykes MD  [place] [address]  [address] [facility]    Patient: Shmuel Lopez   YOB: 1995   Date of Visit: 1/23/2019       Dear Dr Suzanne Spivey: Thank you for referring Nelson Freeman to me for evaluation  Below are my notes for this consultation  If you have questions, please do not hesitate to call me  I look forward to following your patient along with you  Sincerely,        Valerie Bales MD        CC: No Recipients  Valerie Bales MD  1/23/2019  1:42 PM  Sign at close encounter  Patient is here today as a new patient for syncope    Patient ID: Shmuel Lopez is a 20 y o  female  Assessment/Plan:    Neurocardiogenic syncope  History certainly consistent with syncopal (vasovagal) events  Evaluation through Cardiology supporting that diagnosis  However, I would like to undertake some additional to further secure diagnosis  --sleep-deprived EEG   --CT brain  --she will continue to follow those measures as outlined through Cardiology  She will follow up after completion of her additional studies  Subjective:    HPI  Patient, a 20years of age and right-handed, presents for episodes of loss of consciousness  She reports that she began having episodes during her mid teen years and the episodes were more frequent during her pregnancy  Her last episode occurred in August of this past year  The events she describes are fairly stereotyped  She begins with a feeling of being overheated followed by a sense of lightheadedness and subsequent loss of consciousness, although on occasion in the past was still aware of the environment     These apparently have been brief with no associated bowel or bladder control loss and no associated tongue bite with an immediate return to full level of awareness  Observers have not reported any associated adventitious motor activity      She has been evaluated by Cardiology who feel that the episodes are most likely vasovagal in origin (neurocardiogenic syncope if you will)  She has been instructed in several measures to avoid her situation from recurring including maintaining adequate hydration  She denies having had any syncopal or near syncopal episodes since her early August episode  Cardiology has cleared her for driving  Past Medical History:   Diagnosis Date    Anemia     Asthma     Heart murmur      History reviewed  No pertinent surgical history  Social History     Social History    Marital status: Single     Spouse name: N/A    Number of children: N/A    Years of education: N/A     Social History Main Topics    Smoking status: Never Smoker    Smokeless tobacco: Never Used    Alcohol use Yes      Comment: rarely; about 3x a year    Drug use: No    Sexual activity: Not Asked     Other Topics Concern    None     Social History Narrative    None     History reviewed  No pertinent family history  Allergies   Allergen Reactions    Eggs Or Egg-Derived Products Anaphylaxis    Peanut Oil Anaphylaxis    Baking Soda-Fluoride [Sodium Fluoride] Swelling    Benzocaine Swelling       Current Outpatient Prescriptions:     albuterol (VENTOLIN HFA) 90 mcg/act inhaler, Inhale 2 puffs every 4 (four) hours as needed for wheezing, Disp: 1 Inhaler, Rfl: 0    desogestrel-ethinyl estradiol (APRI) 0 15-30 MG-MCG per tablet, every 24 hours, Disp: , Rfl:     pentosan polysulfate (ELMIRON) 100 mg capsule, 3 (three) times a day, Disp: , Rfl:     dicyclomine (BENTYL) 20 mg tablet, Take 1 tablet (20 mg total) by mouth 2 (two) times a day (Patient not taking: Reported on 1/23/2019 ), Disp: 20 tablet, Rfl: 0    ondansetron (ZOFRAN) 4 mg tablet, Take 1 tablet (4 mg total) by mouth every 6 (six) hours (Patient not taking: Reported on 1/23/2019 ), Disp: 15 tablet, Rfl: 0    Objective:    Blood pressure 102/78, pulse 103, resp   rate 18, height 5' 6 5" (1 689 m), weight 73 5 kg (162 lb), last menstrual period 12/10/2018, not currently breastfeeding  Physical Exam  Blood pressure recumbent 110/68 with pulse 72  Blood pressure sitting 102/68 with pulse 80  Blood pressure standing one await over 70 with pulse 97  Head normocephalic  Eyes nonicteric  No audible head or anterior neck bruits  Lungs clear to auscultation  Rhythm regular  GI (abdomen) soft nontender with bowel sounds present  No lower extremity edema  Neurological Exam  Alert  Pleasantly interactive  Fully oriented  Unremarkable spontaneous gait  Able to tandem walk  Romberg maneuver performed unremarkably  Cranial Nerves:   I: Olfactory sense intact bilaterally  II: Visual fields full to confrontation  Pupils equal, round, reactive to light with normal accomodation  Fundus: with bilaterally marginated discs  III,IV,VI: Extraocular muscles EOMI, no nystagmus  V: Masseter and pterygoid strength  Sensation in the V1 through V3 distributions intact to pinprick and light touch bilaterally  VII: Face is symmetric with no weakness noted  VIII: Audition intact to finger rub bilaterally  IX/X: Uvula midline  Soft palate elevation symmetric  XI: Trapezius and SCM strength 5/5 bilaterally  XII: Tongue midline with no atrophy or fasciculations with appropriate movement  Accurate with finger-to-nose and heel-to-shin maneuvers bilaterally  Full symmetrical strength throughout the four extremities with no upper extremity drift  Sensory testing grossly intact to pin, position and vibration throughout  Muscle stretch reflexes bilaterally 1 throughout the upper extremities and at the knees and bilaterally 2 at the ankles  Toe response downgoing bilaterally  ROS:    Review of Systems   Constitutional: Negative  Negative for appetite change and fever  HENT: Positive for tinnitus (right ear)  Negative for hearing loss, trouble swallowing and voice change  Eyes: Negative  Negative for photophobia and pain     Respiratory: Positive for shortness of breath  With walking and if the weather is to cold   Cardiovascular: Negative  Negative for palpitations  Gastrointestinal: Negative  Negative for nausea and vomiting  Endocrine: Negative  Negative for cold intolerance and heat intolerance  Genitourinary: Positive for frequency and urgency  Negative for dysuria  Musculoskeletal: Positive for back pain (whole back)  Negative for myalgias and neck pain  Skin: Negative  Negative for rash  Neurological: Positive for headaches (migraine's)  Negative for dizziness, tremors, seizures, syncope, facial asymmetry, speech difficulty, weakness, light-headedness and numbness  Pulsating pain in head   Hematological: Negative  Does not bruise/bleed easily  Psychiatric/Behavioral: Negative  Negative for confusion, hallucinations and sleep disturbance  I personally reviewed the ROS that was entered by the medical assistant  *Please note this document was created using voice recognition software and may contain sound-alike word errors  *

## 2019-01-23 NOTE — ASSESSMENT & PLAN NOTE
History certainly consistent with syncopal (vasovagal) events  Evaluation through Cardiology supporting that diagnosis  However, I would like to undertake some additional to further secure diagnosis  --sleep-deprived EEG   --CT brain  --she will continue to follow those measures as outlined through Cardiology

## 2019-01-24 DIAGNOSIS — R55 RECURRENT SYNCOPE: Primary | ICD-10-CM

## 2019-01-29 ENCOUNTER — APPOINTMENT (OUTPATIENT)
Dept: CT IMAGING | Facility: HOSPITAL | Age: 24
End: 2019-01-29
Payer: COMMERCIAL

## 2019-01-29 ENCOUNTER — HOSPITAL ENCOUNTER (OUTPATIENT)
Dept: NEUROLOGY | Facility: HOSPITAL | Age: 24
Discharge: HOME | End: 2019-01-29
Payer: COMMERCIAL

## 2019-01-29 ENCOUNTER — HOSPITAL ENCOUNTER (OUTPATIENT)
Dept: MRI IMAGING | Facility: HOSPITAL | Age: 24
Discharge: HOME | End: 2019-01-29
Payer: COMMERCIAL

## 2019-01-29 DIAGNOSIS — R55 RECURRENT SYNCOPE: ICD-10-CM

## 2019-01-29 DIAGNOSIS — R55 NEUROCARDIOGENIC SYNCOPE: ICD-10-CM

## 2019-01-29 PROCEDURE — 70551 MRI BRAIN STEM W/O DYE: CPT

## 2019-01-29 PROCEDURE — 95812 EEG 41-60 MINUTES: CPT | Performed by: PSYCHIATRY & NEUROLOGY

## 2019-01-29 PROCEDURE — 95819 EEG AWAKE AND ASLEEP: CPT

## 2019-02-13 ENCOUNTER — OFFICE VISIT (OUTPATIENT)
Dept: NEUROLOGY | Facility: CLINIC | Age: 24
End: 2019-02-13
Payer: COMMERCIAL

## 2019-02-13 VITALS
DIASTOLIC BLOOD PRESSURE: 68 MMHG | SYSTOLIC BLOOD PRESSURE: 96 MMHG | WEIGHT: 161.6 LBS | HEART RATE: 101 BPM | BODY MASS INDEX: 25.36 KG/M2 | HEIGHT: 67 IN | RESPIRATION RATE: 18 BRPM

## 2019-02-13 DIAGNOSIS — R55 NEUROCARDIOGENIC SYNCOPE: Primary | ICD-10-CM

## 2019-02-13 PROCEDURE — 99213 OFFICE O/P EST LOW 20 MIN: CPT | Performed by: PSYCHIATRY & NEUROLOGY

## 2019-02-13 NOTE — ASSESSMENT & PLAN NOTE
No further syncopal or near syncopal symptoms  Unremarkable MRI brain and normal awake, drowsing and sleep EEG study  No support for an epileptogenic basis  Appears to represent a vasovagal origin, likely further augmented by the fact that she generally is relatively hypotensive  --will continue measures as outlined in her Cardiology appointments  --will continue her ongoing general medical follow-up with her PCP   --follow-up by Neurology all [place]

## 2019-02-13 NOTE — LETTER
February 13, 2019     MD [address]  [address] [facility]    Patient: Edith Grace   YOB: 1995   Date of Visit: 2/13/2019       Dear Dr Taylor Heart: Thank you for referring Chris Obrien to me for evaluation  Below are my notes for this consultation  If you have questions, please do not hesitate to call me  I look forward to following your patient along with you  Sincerely,        Kale Mustafa MD        CC: No Recipients  Kale Mustafa MD  2/13/2019  9:32 AM  Sign at close encounter  Patient is here today for her syncope    Patient ID: Edith Grace is a 20 y o  female  Assessment/Plan:    Neurocardiogenic syncope  No further syncopal or near syncopal symptoms  Unremarkable MRI brain and normal awake, drowsing and sleep EEG study  No support for an epileptogenic basis  Appears to represent a vasovagal origin, likely further augmented by the fact that she generally is relatively hypotensive  --will continue measures as outlined in her Cardiology appointments  --will continue her ongoing general medical follow-up with her PCP   --follow-up by Neurology all [place] She will follow up on an as-needed basis  Subjective:    HPI  The patient, a 20years of age, returns to review the results of her additional studies in light of her past issues with syncope/near-syncope  Previous evaluation, including evaluation by Cardiology, supportive of a neurocardiogenic basis (vasovagal)  Since last seen here she has had no syncopal episodes or near syncopal episodes  Denies any overt issues with orthostatic symptoms  To complete her evaluation she did have structural study with brain MRI and an EEG study  Brain MRI was unremarkable  EEG was a normal awake, drowsing and sleep study  Past Medical History:   Diagnosis Date    Anemia     Asthma     Heart murmur      History reviewed  No pertinent surgical history    Social History Socioeconomic History    Marital status: Single     Spouse name: None    Number of children: None    Years of education: None    Highest education level: None   Occupational History    None   Social Needs    Financial resource strain: None    Food insecurity:     Worry: None     Inability: None    Transportation needs:     Medical: None     Non-medical: None   Tobacco Use    Smoking status: Never Smoker    Smokeless tobacco: Never Used   Substance and Sexual Activity    Alcohol use: Yes     Comment: rarely; about 3x a year    Drug use: No    Sexual activity: None   Lifestyle    Physical activity:     Days per week: None     Minutes per session: None    Stress: None   Relationships    Social connections:     Talks on phone: None     Gets together: None     Attends [employer] service: None     Active member of club or organization: None     Attends meetings of clubs or organizations: None     Relationship status: None    Intimate partner violence:     Fear of current or ex partner: None     Emotionally abused: None     Physically abused: None     Forced sexual activity: None   Other Topics Concern    None   Social History Narrative    None     History reviewed  No pertinent family history    Allergies   Allergen Reactions    Eggs Or Egg-Derived Products Anaphylaxis    Peanut Oil Anaphylaxis    Baking Soda-Fluoride [Sodium Fluoride] Swelling    Benzocaine Swelling       Current Outpatient Medications:     albuterol (VENTOLIN HFA) 90 mcg/act inhaler, Inhale 2 puffs every 4 (four) hours as needed for wheezing, Disp: 1 Inhaler, Rfl: 0    desogestrel-ethinyl estradiol (APRI) 0 15-30 MG-MCG per tablet, every 24 hours, Disp: , Rfl:     pentosan polysulfate (ELMIRON) 100 mg capsule, 3 (three) times a day, Disp: , Rfl:     dicyclomine (BENTYL) 20 mg tablet, Take 1 tablet (20 mg total) by mouth 2 (two) times a day (Patient not taking: Reported on 1/23/2019 ), Disp: 20 tablet, Rfl: 0    ondansetron (ZOFRAN) 4 mg tablet, Take 1 tablet (4 mg total) by mouth every 6 (six) hours (Patient not taking: Reported on 1/23/2019 ), Disp: 15 tablet, Rfl: 0    Objective:    Blood pressure 96/68, pulse 101, resp  rate 18, height 5' 6 5" (1 689 m), weight 73 3 kg (161 lb 9 6 oz), not currently breastfeeding  Physical Exam  Blood pressure recumbent 104/60 with pulse 88  Blood pressure sitting 110/66 with pulse 100  Blood pressure standing 96/68 with pulse 101 (asymptomatic)  Lungs clear to auscultation  Rhythm regular  No audible head or anterior neck bruits  Neurological Exam  Alert  Pleasantly interactive  Fully oriented  Unremarkable spontaneous gait  Cranial nerves 2-12 tested and grossly intact  Funduscopic examination with bilaterally marginated discs  Accurate with finger-to-nose and heel-to-shin maneuvers bilaterally  Full symmetrical strength throughout the four extremities  No upper extremity drift  Muscle stretch reflexes bilaterally 1 throughout the upper extremities and at the knees and bilaterally 2 at the ankles  Toe response downgoing bilaterally  ROS:    Review of Systems   Constitutional: Positive for appetite change and fatigue  Negative for fever  HENT: Negative  Negative for hearing loss, tinnitus, trouble swallowing and voice change  Eyes: Negative  Negative for photophobia and pain  Respiratory: Positive for shortness of breath  Cardiovascular: Positive for palpitations  Gastrointestinal: Negative  Negative for nausea and vomiting  Endocrine: Negative  Negative for cold intolerance and heat intolerance  Genitourinary: Positive for frequency and urgency  Negative for dysuria  Musculoskeletal: Positive for back pain  Negative for myalgias and neck pain  Skin: Negative  Negative for rash  Allergic/Immunologic: Negative  Neurological: Positive for dizziness and headaches   Negative for tremors, seizures, syncope, facial asymmetry, speech difficulty, weakness, light-headedness and numbness  Hematological: Negative  Does not bruise/bleed easily  Psychiatric/Behavioral: Positive for agitation and sleep disturbance  Negative for confusion and hallucinations  I personally reviewed the ROS that was entered by the medical assistant  *Please note this document was created using voice recognition software and may contain sound-alike word errors  *

## 2019-02-13 NOTE — PROGRESS NOTES
Patient is here today for her syncope    Patient ID: Ivania Powell is a 20 y o  female  Assessment/Plan:    Neurocardiogenic syncope  No further syncopal or near syncopal symptoms  Unremarkable MRI brain and normal awake, drowsing and sleep EEG study  No support for an epileptogenic basis  Appears to represent a vasovagal origin, likely further augmented by the fact that she generally is relatively hypotensive  --will continue measures as outlined in her Cardiology appointments  --will continue her ongoing general medical follow-up with her PCP   --follow-up by Neurology all Jung She will follow up on an as-needed basis  Subjective:    HPI  The patient, a 20years of age, returns to review the results of her additional studies in light of her past issues with syncope/near-syncope  Previous evaluation, including evaluation by Cardiology, supportive of a neurocardiogenic basis (vasovagal)  Since last seen here she has had no syncopal episodes or near syncopal episodes  Denies any overt issues with orthostatic symptoms  To complete her evaluation she did have structural study with brain MRI and an EEG study  Brain MRI was unremarkable  EEG was a normal awake, drowsing and sleep study  Past Medical History:   Diagnosis Date    Anemia     Asthma     Heart murmur      History reviewed  No pertinent surgical history    Social History     Socioeconomic History    Marital status: Single     Spouse name: None    Number of children: None    Years of education: None    Highest education level: None   Occupational History    None   Social Needs    Financial resource strain: None    Food insecurity:     Worry: None     Inability: None    Transportation needs:     Medical: None     Non-medical: None   Tobacco Use    Smoking status: Never Smoker    Smokeless tobacco: Never Used   Substance and Sexual Activity    Alcohol use: Yes     Comment: rarely; about 3x a year    Drug use: No    Sexual activity: None   Lifestyle    Physical activity:     Days per week: None     Minutes per session: None    Stress: None   Relationships    Social connections:     Talks on phone: None     Gets together: None     Attends [employer] service: None     Active member of club or organization: None     Attends meetings of clubs or organizations: None     Relationship status: None    Intimate partner violence:     Fear of current or ex partner: None     Emotionally abused: None     Physically abused: None     Forced sexual activity: None   Other Topics Concern    None   Social History Narrative    None     History reviewed  No pertinent family history  Allergies   Allergen Reactions    Eggs Or Egg-Derived Products Anaphylaxis    Peanut Oil Anaphylaxis    Baking Soda-Fluoride [Sodium Fluoride] Swelling    Benzocaine Swelling       Current Outpatient Medications:     albuterol (VENTOLIN HFA) 90 mcg/act inhaler, Inhale 2 puffs every 4 (four) hours as needed for wheezing, Disp: 1 Inhaler, Rfl: 0    desogestrel-ethinyl estradiol (APRI) 0 15-30 MG-MCG per tablet, every 24 hours, Disp: , Rfl:     pentosan polysulfate (ELMIRON) 100 mg capsule, 3 (three) times a day, Disp: , Rfl:     dicyclomine (BENTYL) 20 mg tablet, Take 1 tablet (20 mg total) by mouth 2 (two) times a day (Patient not taking: Reported on 1/23/2019 ), Disp: 20 tablet, Rfl: 0    ondansetron (ZOFRAN) 4 mg tablet, Take 1 tablet (4 mg total) by mouth every 6 (six) hours (Patient not taking: Reported on 1/23/2019 ), Disp: 15 tablet, Rfl: 0    Objective:    Blood pressure 96/68, pulse 101, resp  rate 18, height 5' 6 5" (1 689 m), weight 73 3 kg (161 lb 9 6 oz), not currently breastfeeding  Physical Exam  Blood pressure recumbent 104/60 with pulse 88  Blood pressure sitting 110/66 with pulse 100  Blood pressure standing 96/68 with pulse 101 (asymptomatic)  Lungs clear to auscultation  Rhythm regular  No audible head or anterior neck bruits  Neurological Exam  Alert  Pleasantly interactive  Fully oriented  Unremarkable spontaneous gait  Cranial nerves 2-12 tested and grossly intact  Funduscopic examination with bilaterally marginated discs  Accurate with finger-to-nose and heel-to-shin maneuvers bilaterally  Full symmetrical strength throughout the four extremities  No upper extremity drift  Muscle stretch reflexes bilaterally 1 throughout the upper extremities and at the knees and bilaterally 2 at the ankles  Toe response downgoing bilaterally  ROS:    Review of Systems   Constitutional: Positive for appetite change and fatigue  Negative for fever  HENT: Negative  Negative for hearing loss, tinnitus, trouble swallowing and voice change  Eyes: Negative  Negative for photophobia and pain  Respiratory: Positive for shortness of breath  Cardiovascular: Positive for palpitations  Gastrointestinal: Negative  Negative for nausea and vomiting  Endocrine: Negative  Negative for cold intolerance and heat intolerance  Genitourinary: Positive for frequency and urgency  Negative for dysuria  Musculoskeletal: Positive for back pain  Negative for myalgias and neck pain  Skin: Negative  Negative for rash  Allergic/Immunologic: Negative  Neurological: Positive for dizziness and headaches  Negative for tremors, seizures, syncope, facial asymmetry, speech difficulty, weakness, light-headedness and numbness  Hematological: Negative  Does not bruise/bleed easily  Psychiatric/Behavioral: Positive for agitation and sleep disturbance  Negative for confusion and hallucinations  I personally reviewed the ROS that was entered by the medical assistant  *Please note this document was created using voice recognition software and may contain sound-alike word errors  *

## 2019-04-26 ENCOUNTER — OFFICE VISIT (OUTPATIENT)
Dept: FAMILY MEDICINE CLINIC | Facility: CLINIC | Age: 24
End: 2019-04-26

## 2019-04-26 VITALS
SYSTOLIC BLOOD PRESSURE: 108 MMHG | BODY MASS INDEX: 26.23 KG/M2 | OXYGEN SATURATION: 99 % | DIASTOLIC BLOOD PRESSURE: 72 MMHG | TEMPERATURE: 97.6 F | HEART RATE: 74 BPM | RESPIRATION RATE: 16 BRPM | WEIGHT: 165 LBS

## 2019-04-26 DIAGNOSIS — R09.82 POSTNASAL DRIP: ICD-10-CM

## 2019-04-26 DIAGNOSIS — J45.40 MODERATE PERSISTENT ASTHMA WITHOUT COMPLICATION: Primary | ICD-10-CM

## 2019-04-26 PROCEDURE — 99213 OFFICE O/P EST LOW 20 MIN: CPT | Performed by: FAMILY MEDICINE

## 2019-04-26 RX ORDER — FLUTICASONE PROPIONATE 50 MCG
1 SPRAY, SUSPENSION (ML) NASAL
Qty: 16 G | Refills: 2 | Status: SHIPPED | OUTPATIENT
Start: 2019-04-26 | End: 2020-10-07 | Stop reason: SDUPTHER

## 2019-04-26 RX ORDER — ALBUTEROL SULFATE 90 UG/1
2 AEROSOL, METERED RESPIRATORY (INHALATION) EVERY 4 HOURS PRN
Qty: 2 INHALER | Refills: 3 | Status: SHIPPED | OUTPATIENT
Start: 2019-04-26 | End: 2020-10-07 | Stop reason: SDUPTHER

## 2019-04-26 RX ORDER — MONTELUKAST SODIUM 10 MG/1
10 TABLET ORAL
Qty: 90 TABLET | Refills: 3 | Status: SHIPPED | OUTPATIENT
Start: 2019-04-26 | End: 2021-12-15 | Stop reason: SDUPTHER

## 2019-06-06 ENCOUNTER — OFFICE VISIT (OUTPATIENT)
Dept: FAMILY MEDICINE CLINIC | Facility: CLINIC | Age: 24
End: 2019-06-06

## 2019-06-06 VITALS
OXYGEN SATURATION: 99 % | BODY MASS INDEX: 26.53 KG/M2 | HEIGHT: 67 IN | SYSTOLIC BLOOD PRESSURE: 110 MMHG | TEMPERATURE: 97.5 F | DIASTOLIC BLOOD PRESSURE: 72 MMHG | WEIGHT: 169 LBS | HEART RATE: 84 BPM | RESPIRATION RATE: 18 BRPM

## 2019-06-06 DIAGNOSIS — J45.40 MODERATE PERSISTENT ASTHMA WITHOUT COMPLICATION: Primary | ICD-10-CM

## 2019-06-06 DIAGNOSIS — J30.2 SEASONAL ALLERGIES: ICD-10-CM

## 2019-06-06 PROCEDURE — 99213 OFFICE O/P EST LOW 20 MIN: CPT | Performed by: FAMILY MEDICINE

## 2019-06-06 RX ORDER — LORATADINE 10 MG/1
10 TABLET ORAL DAILY
Qty: 30 TABLET | Refills: 1 | Status: SHIPPED | OUTPATIENT
Start: 2019-06-06 | End: 2020-10-07 | Stop reason: SDUPTHER

## 2019-06-20 ENCOUNTER — HOSPITAL ENCOUNTER (EMERGENCY)
Facility: HOSPITAL | Age: 24
Discharge: HOME | End: 2019-06-20
Attending: EMERGENCY MEDICINE | Admitting: EMERGENCY MEDICINE
Payer: COMMERCIAL

## 2019-06-20 ENCOUNTER — APPOINTMENT (EMERGENCY)
Dept: RADIOLOGY | Facility: HOSPITAL | Age: 24
End: 2019-06-20
Payer: COMMERCIAL

## 2019-06-20 VITALS
RESPIRATION RATE: 18 BRPM | DIASTOLIC BLOOD PRESSURE: 69 MMHG | OXYGEN SATURATION: 100 % | WEIGHT: 166.23 LBS | SYSTOLIC BLOOD PRESSURE: 121 MMHG | HEART RATE: 74 BPM | TEMPERATURE: 97.9 F | BODY MASS INDEX: 26.43 KG/M2

## 2019-06-20 DIAGNOSIS — M54.2 NECK PAIN: Primary | ICD-10-CM

## 2019-06-20 DIAGNOSIS — M79.602 LEFT ARM PAIN: ICD-10-CM

## 2019-06-20 DIAGNOSIS — S46.819A TRAPEZIUS STRAIN: ICD-10-CM

## 2019-06-20 PROCEDURE — 72125 CT NECK SPINE W/O DYE: CPT

## 2019-06-20 PROCEDURE — 99284 EMERGENCY DEPT VISIT MOD MDM: CPT | Performed by: EMERGENCY MEDICINE

## 2019-06-20 PROCEDURE — 99283 EMERGENCY DEPT VISIT LOW MDM: CPT

## 2019-06-20 RX ORDER — IBUPROFEN 600 MG/1
600 TABLET ORAL ONCE
Status: COMPLETED | OUTPATIENT
Start: 2019-06-20 | End: 2019-06-20

## 2019-06-20 RX ADMIN — IBUPROFEN 600 MG: 600 TABLET ORAL at 14:44

## 2019-06-21 ENCOUNTER — PATIENT OUTREACH (OUTPATIENT)
Dept: FAMILY MEDICINE CLINIC | Facility: CLINIC | Age: 24
End: 2019-06-21

## 2019-06-21 ENCOUNTER — OFFICE VISIT (OUTPATIENT)
Dept: FAMILY MEDICINE CLINIC | Facility: CLINIC | Age: 24
End: 2019-06-21

## 2019-06-21 VITALS
TEMPERATURE: 98.3 F | WEIGHT: 167 LBS | SYSTOLIC BLOOD PRESSURE: 106 MMHG | HEART RATE: 86 BPM | DIASTOLIC BLOOD PRESSURE: 78 MMHG | OXYGEN SATURATION: 98 % | BODY MASS INDEX: 26.55 KG/M2

## 2019-06-21 DIAGNOSIS — T74.91XA DOMESTIC VIOLENCE OF ADULT, INITIAL ENCOUNTER: Primary | ICD-10-CM

## 2019-06-21 DIAGNOSIS — S10.93XA CONTUSION OF NECK, INITIAL ENCOUNTER: ICD-10-CM

## 2019-06-21 DIAGNOSIS — S40.012A CONTUSION OF LEFT SHOULDER, INITIAL ENCOUNTER: ICD-10-CM

## 2019-06-21 PROCEDURE — 99213 OFFICE O/P EST LOW 20 MIN: CPT | Performed by: STUDENT IN AN ORGANIZED HEALTH CARE EDUCATION/TRAINING PROGRAM

## 2019-08-22 ENCOUNTER — OFFICE VISIT (OUTPATIENT)
Dept: URGENT CARE | Age: 24
End: 2019-08-22
Payer: COMMERCIAL

## 2019-08-22 VITALS
SYSTOLIC BLOOD PRESSURE: 131 MMHG | DIASTOLIC BLOOD PRESSURE: 78 MMHG | HEIGHT: 67 IN | BODY MASS INDEX: 27.78 KG/M2 | HEART RATE: 86 BPM | TEMPERATURE: 98.1 F | RESPIRATION RATE: 16 BRPM | OXYGEN SATURATION: 95 % | WEIGHT: 177 LBS

## 2019-08-22 DIAGNOSIS — L02.91 ABSCESS: Primary | ICD-10-CM

## 2019-08-22 PROCEDURE — G0382 LEV 3 HOSP TYPE B ED VISIT: HCPCS | Performed by: NURSE PRACTITIONER

## 2019-08-22 PROCEDURE — 99213 OFFICE O/P EST LOW 20 MIN: CPT | Performed by: NURSE PRACTITIONER

## 2019-08-22 PROCEDURE — 99283 EMERGENCY DEPT VISIT LOW MDM: CPT | Performed by: NURSE PRACTITIONER

## 2019-08-22 RX ORDER — SULFAMETHOXAZOLE AND TRIMETHOPRIM 800; 160 MG/1; MG/1
1 TABLET ORAL EVERY 12 HOURS SCHEDULED
Qty: 20 TABLET | Refills: 0 | Status: SHIPPED | OUTPATIENT
Start: 2019-08-22 | End: 2019-09-01

## 2019-08-22 NOTE — PATIENT INSTRUCTIONS
Take meds as directed  Follow up with pcp  We offer over-the-counter meds that can be purchased here at the office for your convenience   Cough and cold meds:   Mucinex for $14 00   Mucinex DM for $14 00   Delsym for $14 dollars   Cepacol Extra strength for $4 00,     Allergy medicine  Bendaryl for $4 00  Cetrizine (Zyrtec) for $4 00,     Pain relief  Ibuprofen (motrin) for $4 00  Acetaminophen (tylenol) for $4 00  Childrens tylenol and motrin for $4 00    Itch and Rash Relief  % Hydrocortisone Cream for $4 00      Abscess   WHAT YOU NEED TO KNOW:   A warm compress may help your abscess drain  Your healthcare provider may make a cut in the abscess so it can drain  You may need surgery to remove an abscess that is on your hands or buttocks  DISCHARGE INSTRUCTIONS:   Return to the emergency department if:   · The area around your abscess becomes very painful, warm, or has red streaks  · You have a fever and chills  · Your heart is beating faster than usual      · You feel faint or confused  Contact your healthcare provider if:   · Your abscess gets bigger or does not get better  · Your abscess returns  · You have questions or concerns about your condition or care  Medicines: You may  need any of the following:  · Antibiotics  help treat a bacterial infection  · Acetaminophen  decreases pain and fever  It is available without a doctor's order  Ask how much to take and how often to take it  Follow directions  Acetaminophen can cause liver damage if not taken correctly  · NSAIDs , such as ibuprofen, help decrease swelling, pain, and fever  This medicine is available with or without a doctor's order  NSAIDs can cause stomach bleeding or kidney problems in certain people  If you take blood thinner medicine, always ask your healthcare provider if NSAIDs are safe for you  Always read the medicine label and follow directions  · Take your medicine as directed    Contact your healthcare provider if you think your medicine is not helping or if you have side effects  Tell him or her if you are allergic to any medicine  Keep a list of the medicines, vitamins, and herbs you take  Include the amounts, and when and why you take them  Bring the list or the pill bottles to follow-up visits  Carry your medicine list with you in case of an emergency  Self-care:   · Apply a warm compress to your abscess  This will help it open and drain  Wet a washcloth in warm, but not hot, water  Apply the compress for 10 minutes  Repeat this 4 times each day  Do not  press on an abscess or try to open it with a needle  You may push the bacteria deeper or into your blood  · Do not share your clothes, towels, or sheets with anyone  This can spread the infection to others  · Wash your hands often  This can help prevent the spread of germs  Use soap and water or an alcohol-based hand rub  Care for your wound after it is drained:   · Care for your wound as directed  If your healthcare provider says it is okay, carefully remove the bandage and gauze packing  You may need to soak the gauze to get it out of your wound  Clean your wound and the area around it as directed  Dry the area and put on new, clean bandages  Change your bandages when they get wet or dirty  · Ask your healthcare provider how to change the gauze in your wound  Keep track of how many pieces of gauze are placed inside the wound  Do not put too much packing in the wound  Do not pack the gauze too tightly in your wound  Follow up with your healthcare provider in 1 to 3 days: You may need to have your packing removed or your bandage changed  Write down your questions so you remember to ask them during your visits  © 2017 [facility] Information is for End User's use only and may not be sold, redistributed or otherwise used for commercial purposes   All illustrations and images included in CareNotes® are the copyrighted property of [employer]  or [employer]  The above information is an  only  It is not intended as medical advice for individual conditions or treatments  Talk to your doctor, nurse or pharmacist before following any medical regimen to see if it is safe and effective for you

## 2019-08-22 NOTE — PROGRESS NOTES
NAME: Jan Jauregui is a 30 y o  female  : 1995    MRN: [id number]      Assessment and Plan   Abscess [L02 91]  1  Abscess  sulfamethoxazole-trimethoprim (BACTRIM DS) 800-160 mg per tablet       Felice Rogers was seen today for abscess  Diagnoses and all orders for this visit:    Abscess  -     sulfamethoxazole-trimethoprim (BACTRIM DS) 800-160 mg per tablet; Take 1 tablet by mouth every 12 (twelve) hours for 10 days        Patient Instructions   Patient Instructions   Take meds as directed  Follow up with pcp  We offer over-the-counter meds that can be purchased here at the office for your convenience   Cough and cold meds:   Mucinex for $14 00   Mucinex DM for $14 00   Delsym for $14 dollars   Cepacol Extra strength for $4 00,     Allergy medicine  Bendaryl for $4 00  Cetrizine (Zyrtec) for $4 00,     Pain relief  Ibuprofen (motrin) for $4 00  Acetaminophen (tylenol) for $4 00  Childrens tylenol and motrin for $4 00    Itch and Rash Relief  % Hydrocortisone Cream for $4 00      Abscess   WHAT YOU NEED TO KNOW:   A warm compress may help your abscess drain  Your healthcare provider may make a cut in the abscess so it can drain  You may need surgery to remove an abscess that is on your hands or buttocks  DISCHARGE INSTRUCTIONS:   Return to the emergency department if:   · The area around your abscess becomes very painful, warm, or has red streaks  · You have a fever and chills  · Your heart is beating faster than usual      · You feel faint or confused  Contact your healthcare provider if:   · Your abscess gets bigger or does not get better  · Your abscess returns  · You have questions or concerns about your condition or care  Medicines: You may  need any of the following:  · Antibiotics  help treat a bacterial infection  · Acetaminophen  decreases pain and fever  It is available without a doctor's order  Ask how much to take and how often to take it  Follow directions  Acetaminophen can cause liver damage if not taken correctly  · NSAIDs , such as ibuprofen, help decrease swelling, pain, and fever  This medicine is available with or without a doctor's order  NSAIDs can cause stomach bleeding or kidney problems in certain people  If you take blood thinner medicine, always ask your healthcare provider if NSAIDs are safe for you  Always read the medicine label and follow directions  · Take your medicine as directed  Contact your healthcare provider if you think your medicine is not helping or if you have side effects  Tell him or her if you are allergic to any medicine  Keep a list of the medicines, vitamins, and herbs you take  Include the amounts, and when and why you take them  Bring the list or the pill bottles to follow-up visits  Carry your medicine list with you in case of an emergency  Self-care:   · Apply a warm compress to your abscess  This will help it open and drain  Wet a washcloth in warm, but not hot, water  Apply the compress for 10 minutes  Repeat this 4 times each day  Do not  press on an abscess or try to open it with a needle  You may push the bacteria deeper or into your blood  · Do not share your clothes, towels, or sheets with anyone  This can spread the infection to others  · Wash your hands often  This can help prevent the spread of germs  Use soap and water or an alcohol-based hand rub  Care for your wound after it is drained:   · Care for your wound as directed  If your healthcare provider says it is okay, carefully remove the bandage and gauze packing  You may need to soak the gauze to get it out of your wound  Clean your wound and the area around it as directed  Dry the area and put on new, clean bandages  Change your bandages when they get wet or dirty  · Ask your healthcare provider how to change the gauze in your wound  Keep track of how many pieces of gauze are placed inside the wound   Do not put too much packing in the wound  Do not pack the gauze too tightly in your wound  Follow up with your healthcare provider in 1 to 3 days: You may need to have your packing removed or your bandage changed  Write down your questions so you remember to ask them during your visits  © 2017 [address] Information is for End User's use only and may not be sold, redistributed or otherwise used for commercial purposes  All illustrations and images included in CareNotes® are the copyrighted property of [employer]  or [employer]  The above information is an  only  It is not intended as medical advice for individual conditions or treatments  Talk to your doctor, nurse or pharmacist before following any medical regimen to see if it is safe and effective for you  Proceed to ER if symptoms worsen  Chief Complaint     Chief Complaint   Patient presents with    Abscess     Patient states she has some kind of pimple/rash/abscess for about a week and a half on her left buttocks; PT states it is painful and hard to the touch  Has had this before along with boyfriend and son and went to [facility] and had it drained and is afraid same situation is occuring again  History of Present Illness     Pt here today with an abscess, small in size on the lower left hip buttock area and has been bothersome and getting bigger  She has had them in the past and has had them removed  Denies having hx of MRSA  She has no fevers, and abscess present for the past few days  The area is slightly indurated and painful to touch and was draining prior but not recently  Review of Systems   Review of Systems   Skin: Positive for wound (abscess to the left lower hip )           Current Medications       Current Outpatient Medications:     albuterol (VENTOLIN HFA) 90 mcg/act inhaler, Inhale 2 puffs every 4 (four) hours as needed for wheezing, Disp: 2 Inhaler, Rfl: 3    fluticasone (FLONASE) 50 mcg/act nasal spray, 1 spray into each nostril 2 (two) times a day, Disp: 16 g, Rfl: 2    loratadine (CLARITIN) 10 mg tablet, Take 1 tablet (10 mg total) by mouth daily, Disp: 30 tablet, Rfl: 1    montelukast (SINGULAIR) 10 mg tablet, Take 1 tablet (10 mg total) by mouth daily at bedtime, Disp: 90 tablet, Rfl: 3    OXYBUTYNIN CHLORIDE PO, Take by mouth, Disp: , Rfl:     pentosan polysulfate (ELMIRON) 100 mg capsule, 3 (three) times a day, Disp: , Rfl:     sulfamethoxazole-trimethoprim (BACTRIM DS) 800-160 mg per tablet, Take 1 tablet by mouth every 12 (twelve) hours for 10 days, Disp: 20 tablet, Rfl: 0    Current Allergies     Allergies as of 08/22/2019 - Reviewed 08/22/2019   Allergen Reaction Noted    Eggs or egg-derived products Anaphylaxis 06/06/2018    Peanut oil Anaphylaxis 06/06/2018    Baking soda-fluoride [sodium fluoride] Swelling 08/10/2018    Benzocaine Swelling 06/06/2018    Aloe vera Hives, Itching, and Rash 04/25/2019              Past Medical History:   Diagnosis Date    Anemia     Asthma     Heart murmur        History reviewed  No pertinent surgical history  History reviewed  No pertinent family history  Medications have been verified  The following portions of the patient's history were reviewed and updated as appropriate: allergies, current medications, past family history, past medical history, past social history, past surgical history and problem list     Objective   /78   Pulse 86   Temp 98 1 °F (36 7 °C)   Resp 16   Ht 5' 7" (1 702 m)   Wt 80 3 kg (177 lb)   SpO2 95%   BMI 27 72 kg/m²      Physical Exam     Physical Exam   Constitutional: She appears well-developed and well-nourished  Skin: No rash noted              KEVEN Rahman

## 2019-09-16 ENCOUNTER — OFFICE VISIT (OUTPATIENT)
Dept: FAMILY MEDICINE CLINIC | Facility: CLINIC | Age: 24
End: 2019-09-16

## 2019-09-16 VITALS
DIASTOLIC BLOOD PRESSURE: 78 MMHG | WEIGHT: 180.2 LBS | BODY MASS INDEX: 28.28 KG/M2 | TEMPERATURE: 97.7 F | HEIGHT: 67 IN | HEART RATE: 78 BPM | SYSTOLIC BLOOD PRESSURE: 120 MMHG | RESPIRATION RATE: 18 BRPM | OXYGEN SATURATION: 99 %

## 2019-09-16 DIAGNOSIS — L81.9 CHANGE IN COLOR OF PIGMENTED SKIN LESION: Primary | ICD-10-CM

## 2019-09-16 PROCEDURE — 99213 OFFICE O/P EST LOW 20 MIN: CPT | Performed by: FAMILY MEDICINE

## 2019-09-16 RX ORDER — DESOGESTREL AND ETHINYL ESTRADIOL 0.15-0.03
1 KIT ORAL DAILY
COMMUNITY
End: 2021-12-15

## 2019-09-16 NOTE — ASSESSMENT & PLAN NOTE
No current active lesion at this time  - secondary to multiple recurrent  abscesses   -no sign infection at this time seems more like hypopigmentation secondary to information in the 2 areas  - culture showed the patient's abdominal lesion several months ago had clusters of staph aureus, but did not specify whether it was MRSA   Was treated with Bactrim for 10 days approximately 2 weeks ago  -there is no active infections this time no indication to treat or prophylaxis  - continue chlorhexidine rinse;    If another abscess develops patient to report here to be evaluated for possible cultures, patient agreed and verbalized her understanding  -discussed hygiene patient currently has good hygiene and changes she is frequently
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
## 2019-10-25 ENCOUNTER — OFFICE VISIT (OUTPATIENT)
Dept: FAMILY MEDICINE CLINIC | Facility: CLINIC | Age: 24
End: 2019-10-25

## 2019-10-25 VITALS
TEMPERATURE: 97.5 F | WEIGHT: 179 LBS | BODY MASS INDEX: 28.09 KG/M2 | HEIGHT: 67 IN | HEART RATE: 85 BPM | RESPIRATION RATE: 18 BRPM | OXYGEN SATURATION: 99 % | DIASTOLIC BLOOD PRESSURE: 60 MMHG | SYSTOLIC BLOOD PRESSURE: 110 MMHG

## 2019-10-25 DIAGNOSIS — H66.004 RECURRENT ACUTE SUPPURATIVE OTITIS MEDIA OF RIGHT EAR WITHOUT SPONTANEOUS RUPTURE OF TYMPANIC MEMBRANE: Primary | ICD-10-CM

## 2019-10-25 PROCEDURE — 99213 OFFICE O/P EST LOW 20 MIN: CPT | Performed by: FAMILY MEDICINE

## 2019-10-25 PROCEDURE — 3008F BODY MASS INDEX DOCD: CPT | Performed by: FAMILY MEDICINE

## 2019-10-25 RX ORDER — AMOXICILLIN AND CLAVULANATE POTASSIUM 875; 125 MG/1; MG/1
1 TABLET, FILM COATED ORAL EVERY 12 HOURS SCHEDULED
Qty: 20 TABLET | Refills: 0 | Status: SHIPPED | OUTPATIENT
Start: 2019-10-25 | End: 2019-11-04

## 2019-10-25 NOTE — ASSESSMENT & PLAN NOTE
Will place on Augmentin 875/125 BID for 10 days  Advised to contact clinic if pain or infection get worse   RTC in 2 weeks to reevaluate effected ear/s

## 2019-10-25 NOTE — LETTER
October 25, 2019     Rahul Blanton MD  [facility] [address]  [address] [facility]    Patient: Anuradha Carrillo   YOB: 1995   Date of Visit: 10/25/2019       Dear Dr Marcin Cedeno: Thank you for referring Janell Pelayo to me for evaluation  Below are my notes for this consultation  If you have questions, please do not hesitate to call me  I look forward to following your patient along with you  Sincerely,        Renata Clemons MD        CC: No Recipients  Renata Clemons MD  10/25/2019  2:44 PM  Incomplete  Assessment/Plan:    Recurrent acute suppurative otitis media of right ear without spontaneous rupture of tympanic membrane  Will place on Augmentin 875/125 BID for 10 days  Advised to contact clinic if pain or infection get worse   RTC in 2 weeks to reevaluate effected ear/s       Return for keep PCP  Diagnoses and all orders for this visit:    Recurrent acute suppurative otitis media of right ear without spontaneous rupture of tympanic membrane  -     amoxicillin-clavulanate (AUGMENTIN) 875-125 mg per tablet; Take 1 tablet by mouth every 12 (twelve) hours for 10 days        Subjective:     Anuradha Carrillo is a 30 y o  female who  has a past medical history of Anemia, Asthma, and Heart murmur  who presented to the office today for sore throat  HPI  Patient mentioned that:  Sore Throat    This is a recurrent problem  The current episode started in the past 7 days  The problem has been unchanged  Neither side of throat is experiencing more pain than the other  Maximum temperature: Subjective  The pain is at a severity of 1/10  The patient is experiencing no pain  Associated symptoms include congestion and ear pain  Pertinent negatives include no abdominal pain, coughing, diarrhea, drooling, ear discharge, headaches, neck pain, shortness of breath, trouble swallowing or vomiting  She has had no exposure to strep or mono  She has tried acetaminophen for the symptoms          The following portions of the patient's history were reviewed and updated as appropriate: allergies, current medications, past family history, past medical history, past social history, past surgical history and problem list       Current Outpatient Medications on File Prior to Visit   Medication Sig Dispense Refill    albuterol (VENTOLIN HFA) 90 mcg/act inhaler Inhale 2 puffs every 4 (four) hours as needed for wheezing 2 Inhaler 3    desogestrel-ethinyl estradiol (APRI) 0 15-30 MG-MCG per tablet Take 1 tablet by mouth daily      fluticasone (FLONASE) 50 mcg/act nasal spray 1 spray into each nostril 2 (two) times a day 16 g 2    loratadine (CLARITIN) 10 mg tablet Take 1 tablet (10 mg total) by mouth daily 30 tablet 1    montelukast (SINGULAIR) 10 mg tablet Take 1 tablet (10 mg total) by mouth daily at bedtime 90 tablet 3    OXYBUTYNIN CHLORIDE PO Take by mouth      pentosan polysulfate (ELMIRON) 100 mg capsule 3 (three) times a day       No current facility-administered medications on file prior to visit  Review of Systems   Constitutional: Positive for fever  HENT: Positive for congestion, ear pain, postnasal drip, rhinorrhea and sore throat  Negative for drooling, ear discharge, sinus pressure, sinus pain and trouble swallowing  Eyes: Positive for discharge  Negative for itching and visual disturbance  Respiratory: Negative for cough, chest tightness and shortness of breath  Cardiovascular: Negative for chest pain  Gastrointestinal: Negative for abdominal pain, diarrhea and vomiting  Musculoskeletal: Negative for neck pain  Skin: Negative for rash  Neurological: Negative for weakness and headaches  Hematological: Negative for adenopathy         Objective:    /60 (BP Location: Right arm, Patient Position: Sitting, Cuff Size: Standard)   Pulse 85   Temp 97 5 °F (36 4 °C) (Temporal)   Resp 18   Ht 5' 7" (1 702 m)   Wt 81 2 kg (179 lb)   LMP 10/03/2019 (Exact Date)   SpO2 99% BMI 28 04 kg/m²        Physical Exam   Constitutional: She is oriented to person, place, and time  She appears well-developed and well-nourished  No distress  HENT:   Head: Normocephalic and atraumatic  Right Ear: Hearing and ear canal normal  No drainage, swelling or tenderness  A middle ear effusion is present  Left Ear: Tympanic membrane and external ear normal  No drainage, swelling or tenderness  No middle ear effusion  Nose: Nose normal    Mouth/Throat: Oropharynx is clear and moist    Eyes: Pupils are equal, round, and reactive to light  Conjunctivae are normal  Right eye exhibits no discharge  Left eye exhibits no discharge  Neck: Normal range of motion  Neck supple  Cardiovascular: Normal rate, regular rhythm and normal heart sounds  Exam reveals no gallop and no friction rub  No murmur heard  Pulmonary/Chest: Effort normal and breath sounds normal  No respiratory distress  She has no wheezes  She has no rales  Abdominal: Soft  Bowel sounds are normal  She exhibits no distension  There is no tenderness  Musculoskeletal: Normal range of motion  She exhibits no edema  Neurological: She is alert and oriented to person, place, and time  Skin: Skin is warm and dry  No rash noted  She is not diaphoretic  No erythema  MD Gaudencio  10/25/19  2:42 PM    MD Gaudencio  10/25/2019  2:10 PM  Sign at close encounter  Assessment/Plan:    No problem-specific Assessment & Plan notes found for this encounter  Return for keep PCP  Diagnoses and all orders for this visit:    Recurrent acute suppurative otitis media of right ear without spontaneous rupture of tympanic membrane  -     amoxicillin-clavulanate (AUGMENTIN) 875-125 mg per tablet; Take 1 tablet by mouth every 12 (twelve) hours for 10 days        Subjective:     Breanna Carmen is a 30 y o  female who  has a past medical history of Anemia, Asthma, and Heart murmur  who presented to the office today for sore throat  HPI  Patient mentioned that:  Sore Throat    This is a recurrent problem  The current episode started in the past 7 days  The problem has been unchanged  Neither side of throat is experiencing more pain than the other  Maximum temperature: Subjective  The pain is at a severity of 1/10  The patient is experiencing no pain  Associated symptoms include congestion  Pertinent negatives include no abdominal pain, coughing, diarrhea, drooling, ear discharge, ear pain, headaches, neck pain, shortness of breath, trouble swallowing or vomiting  She has had no exposure to strep or mono  She has tried acetaminophen for the symptoms  The following portions of the patient's history were reviewed and updated as appropriate: allergies, current medications, past family history, past medical history, past social history, past surgical history and problem list       Current Outpatient Medications on File Prior to Visit   Medication Sig Dispense Refill    albuterol (VENTOLIN HFA) 90 mcg/act inhaler Inhale 2 puffs every 4 (four) hours as needed for wheezing 2 Inhaler 3    desogestrel-ethinyl estradiol (APRI) 0 15-30 MG-MCG per tablet Take 1 tablet by mouth daily      fluticasone (FLONASE) 50 mcg/act nasal spray 1 spray into each nostril 2 (two) times a day 16 g 2    loratadine (CLARITIN) 10 mg tablet Take 1 tablet (10 mg total) by mouth daily 30 tablet 1    montelukast (SINGULAIR) 10 mg tablet Take 1 tablet (10 mg total) by mouth daily at bedtime 90 tablet 3    OXYBUTYNIN CHLORIDE PO Take by mouth      pentosan polysulfate (ELMIRON) 100 mg capsule 3 (three) times a day       No current facility-administered medications on file prior to visit  Review of Systems   HENT: Positive for congestion and sore throat  Negative for drooling, ear discharge, ear pain and trouble swallowing  Respiratory: Negative for cough and shortness of breath  Gastrointestinal: Negative for abdominal pain, diarrhea and vomiting  Musculoskeletal: Negative for neck pain  Neurological: Negative for headaches  Objective:    /60 (BP Location: Right arm, Patient Position: Sitting, Cuff Size: Standard)   Pulse 85   Temp 97 5 °F (36 4 °C) (Temporal)   Resp 18   Ht 5' 7" (1 702 m)   Wt 81 2 kg (179 lb)   LMP 10/03/2019 (Exact Date)   SpO2 99%   BMI 28 04 kg/m²        Physical Exam   Constitutional: She is oriented to person, place, and time  She appears well-developed and well-nourished  No distress  HENT:   Head: Normocephalic and atraumatic  Right Ear: No drainage, swelling or tenderness  A middle ear effusion is present  Nose: Nose normal    Eyes: Pupils are equal, round, and reactive to light  Conjunctivae are normal  No scleral icterus  Neck: Normal range of motion  Neck supple  No JVD present  Cardiovascular: Normal rate, regular rhythm and normal heart sounds  Exam reveals no gallop and no friction rub  No murmur heard  Pulmonary/Chest: Effort normal and breath sounds normal  No respiratory distress  She has no wheezes  She has no rales  Abdominal: Soft  Bowel sounds are normal  She exhibits no distension  There is no tenderness  There is no rebound  Musculoskeletal: Normal range of motion  She exhibits no edema  Neurological: She is alert and oriented to person, place, and time  No cranial nerve deficit  Skin: Skin is warm and dry  No rash noted  She is not diaphoretic  No erythema         Karen Núñez MD  10/25/19  2:06 PM

## 2019-10-25 NOTE — PROGRESS NOTES
Assessment/Plan:    Recurrent acute suppurative otitis media of right ear without spontaneous rupture of tympanic membrane  Will place on Augmentin 875/125 BID for 10 days  Advised to contact clinic if pain or infection get worse   RTC in 2 weeks to reevaluate effected ear/s       Return for keep PCP  Diagnoses and all orders for this visit:    Recurrent acute suppurative otitis media of right ear without spontaneous rupture of tympanic membrane  -     amoxicillin-clavulanate (AUGMENTIN) 875-125 mg per tablet; Take 1 tablet by mouth every 12 (twelve) hours for 10 days        Subjective:     Dave Tinoco is a 30 y o  female who  has a past medical history of Anemia, Asthma, and Heart murmur  who presented to the office today for sore throat  HPI  Patient mentioned that:  Sore Throat    This is a recurrent problem  The current episode started in the past 7 days  The problem has been unchanged  Neither side of throat is experiencing more pain than the other  Maximum temperature: Subjective  The pain is at a severity of 1/10  The patient is experiencing no pain  Associated symptoms include congestion and ear pain  Pertinent negatives include no abdominal pain, coughing, diarrhea, drooling, ear discharge, headaches, neck pain, shortness of breath, trouble swallowing or vomiting  She has had no exposure to strep or mono  She has tried acetaminophen for the symptoms          The following portions of the patient's history were reviewed and updated as appropriate: allergies, current medications, past family history, past medical history, past social history, past surgical history and problem list       Current Outpatient Medications on File Prior to Visit   Medication Sig Dispense Refill    albuterol (VENTOLIN HFA) 90 mcg/act inhaler Inhale 2 puffs every 4 (four) hours as needed for wheezing 2 Inhaler 3    desogestrel-ethinyl estradiol (APRI) 0 15-30 MG-MCG per tablet Take 1 tablet by mouth daily      fluticasone (FLONASE) 50 mcg/act nasal spray 1 spray into each nostril 2 (two) times a day 16 g 2    loratadine (CLARITIN) 10 mg tablet Take 1 tablet (10 mg total) by mouth daily 30 tablet 1    montelukast (SINGULAIR) 10 mg tablet Take 1 tablet (10 mg total) by mouth daily at bedtime 90 tablet 3    OXYBUTYNIN CHLORIDE PO Take by mouth      pentosan polysulfate (ELMIRON) 100 mg capsule 3 (three) times a day       No current facility-administered medications on file prior to visit  Review of Systems   Constitutional: Positive for fever  HENT: Positive for congestion, ear pain, postnasal drip, rhinorrhea and sore throat  Negative for drooling, ear discharge, sinus pressure, sinus pain and trouble swallowing  Eyes: Positive for discharge  Negative for itching and visual disturbance  Respiratory: Negative for cough, chest tightness and shortness of breath  Cardiovascular: Negative for chest pain  Gastrointestinal: Negative for abdominal pain, diarrhea and vomiting  Musculoskeletal: Negative for neck pain  Skin: Negative for rash  Neurological: Negative for weakness and headaches  Hematological: Negative for adenopathy  Objective:    /60 (BP Location: Right arm, Patient Position: Sitting, Cuff Size: Standard)   Pulse 85   Temp 97 5 °F (36 4 °C) (Temporal)   Resp 18   Ht 5' 7" (1 702 m)   Wt 81 2 kg (179 lb)   LMP 10/03/2019 (Exact Date)   SpO2 99%   BMI 28 04 kg/m²       Physical Exam   Constitutional: She is oriented to person, place, and time  She appears well-developed and well-nourished  No distress  HENT:   Head: Normocephalic and atraumatic  Right Ear: Hearing and ear canal normal  No drainage, swelling or tenderness  A middle ear effusion is present  Left Ear: Tympanic membrane and external ear normal  No drainage, swelling or tenderness  No middle ear effusion     Nose: Nose normal    Mouth/Throat: Oropharynx is clear and moist    Eyes: Pupils are equal, round, and reactive to light  Conjunctivae are normal  Right eye exhibits no discharge  Left eye exhibits no discharge  Neck: Normal range of motion  Neck supple  Cardiovascular: Normal rate, regular rhythm and normal heart sounds  Exam reveals no gallop and no friction rub  No murmur heard  Pulmonary/Chest: Effort normal and breath sounds normal  No respiratory distress  She has no wheezes  She has no rales  Abdominal: Soft  Bowel sounds are normal  She exhibits no distension  There is no tenderness  Musculoskeletal: Normal range of motion  She exhibits no edema  Neurological: She is alert and oriented to person, place, and time  Skin: Skin is warm and dry  No rash noted  She is not diaphoretic  No erythema         Elver Gonsales MD  10/25/19  2:42 PM

## 2019-10-25 NOTE — PATIENT INSTRUCTIONS
Otitis Media   AMBULATORY CARE:   Otitis media  is an ear infection  Common symptoms include the following:   · Fever or a headache    · Ear pain    · Trouble hearing    · Ear feels plugged or full or you have ringing or buzzing in your ear    · Dizziness or you lose your balance    · Nausea or vomiting  Seek immediate care for the following symptoms:   · Seizure    · Fever and a stiff neck  Treatment for otitis media  may include any of the following:  · NSAIDs , such as ibuprofen, help decrease swelling, pain, and fever  This medicine is available with or without a doctor's order  NSAIDs can cause stomach bleeding or kidney problems in certain people  If you take blood thinner medicine, always ask your healthcare provider if NSAIDs are safe for you  Always read the medicine label and follow directions  · Ear drops  to help treat your ear pain  · Antibiotics  to help kill the germs that caused your ear infection  Care for otitis media:   · Use heat  Place a warm, moist washcloth on your ear to decrease pain  Apply for 15 to 20 minutes, 3 to 4 times a day    · Use ice  Ice helps decrease swelling and pain  Use an ice pack or put crushed ice in a plastic bag  Cover the ice pack with a towel and place it on your ear for 15 to 20 minutes, 3 to 4 times a day for 2 days  Prevent otitis media:   · Wash your hands often  This will help prevent the spread of germs  Encourage everyone in your house to wash their hands with soap and water after they use the bathroom  Everyone should also wash their hands after they change a child's diaper and before they prepare or eat food  · Stay away from people who are ill  Germs are easily and quickly spread through contact  Follow up with your healthcare provider as directed:  Write down your questions so you remember to ask them during your visits     © 2017 [employer] Information is for End User's use only and may not be sold, redistributed or otherwise used for commercial purposes  All illustrations and images included in CareNotes® are the copyrighted property of [employer]  or Wilbur Benson  The above information is an  only  It is not intended as medical advice for individual conditions or treatments  Talk to your doctor, nurse or pharmacist before following any medical regimen to see if it is safe and effective for you

## 2019-11-18 ENCOUNTER — OFFICE VISIT (OUTPATIENT)
Dept: FAMILY MEDICINE CLINIC | Facility: CLINIC | Age: 24
End: 2019-11-18

## 2019-11-18 ENCOUNTER — TELEPHONE (OUTPATIENT)
Dept: FAMILY MEDICINE CLINIC | Facility: CLINIC | Age: 24
End: 2019-11-18

## 2019-11-18 VITALS
BODY MASS INDEX: 28.51 KG/M2 | OXYGEN SATURATION: 98 % | HEART RATE: 88 BPM | TEMPERATURE: 97.5 F | WEIGHT: 182 LBS | DIASTOLIC BLOOD PRESSURE: 64 MMHG | SYSTOLIC BLOOD PRESSURE: 102 MMHG | RESPIRATION RATE: 16 BRPM

## 2019-11-18 DIAGNOSIS — E66.3 OVERWEIGHT (BMI 25.0-29.9): ICD-10-CM

## 2019-11-18 DIAGNOSIS — M23.8X2 ACL LAXITY, LEFT: Primary | ICD-10-CM

## 2019-11-18 PROCEDURE — 99213 OFFICE O/P EST LOW 20 MIN: CPT | Performed by: INTERNAL MEDICINE

## 2019-11-18 RX ORDER — OXYBUTYNIN CHLORIDE 10 MG/1
10 TABLET, EXTENDED RELEASE ORAL DAILY
Refills: 3 | COMMUNITY
Start: 2019-11-09 | End: 2020-12-22 | Stop reason: DRUGHIGH

## 2019-11-18 RX ORDER — FERROUS FUMARATE 324(106)MG
TABLET ORAL
COMMUNITY
Start: 2016-12-13 | End: 2020-09-30 | Stop reason: ALTCHOICE

## 2019-11-18 NOTE — TELEPHONE ENCOUNTER
[facility] wants to know what kind of ACL brace pt needs  I attempted to call back, the pt was being brought back for eval at that time and the person who called was not available, she said she would leave a message that I was calling  If they call back, we're just looking for a stabilizing/supportive type of ACL brace  Not a functional type but maybe a hinged knee brace would be good? I will await call back to see what kinds there are

## 2019-11-18 NOTE — ASSESSMENT & PLAN NOTE
Counseled patient on the importance of working to achieve weight reduction goal   Discussed benefits of weight loss including prevention or control of comorbidities  Discussed role that balanced diet and daily activity play in weight reduction  Set up small attainable weight loss goals    Involve family, friends, and co-workers for support    scheduled for [employer] management on following appt
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
## 2019-11-19 NOTE — ASSESSMENT & PLAN NOTE
No trauma and no inciting incident no imaging indicated at this time   PT for strengthening   Weight reduction  Knee with medial and lateral support   If all the above fail will consider imaging   Follow up in 6-8 weeks

## 2019-11-25 ENCOUNTER — OFFICE VISIT (OUTPATIENT)
Dept: FAMILY MEDICINE CLINIC | Facility: CLINIC | Age: 24
End: 2019-11-25

## 2019-11-25 VITALS
HEART RATE: 77 BPM | TEMPERATURE: 96.9 F | BODY MASS INDEX: 28.94 KG/M2 | SYSTOLIC BLOOD PRESSURE: 106 MMHG | RESPIRATION RATE: 16 BRPM | DIASTOLIC BLOOD PRESSURE: 60 MMHG | WEIGHT: 180.1 LBS | OXYGEN SATURATION: 99 % | HEIGHT: 66 IN

## 2019-11-25 DIAGNOSIS — F52.9 FEMALE SEXUAL DYSFUNCTION: ICD-10-CM

## 2019-11-25 DIAGNOSIS — Z00.00 ENCOUNTER FOR ANNUAL PHYSICAL EXAMINATION EXCLUDING GYNECOLOGICAL EXAMINATION IN A PATIENT OLDER THAN 17 YEARS: Primary | ICD-10-CM

## 2019-11-25 DIAGNOSIS — E66.3 OVERWEIGHT (BMI 25.0-29.9): ICD-10-CM

## 2019-11-25 DIAGNOSIS — Z23 VACCINE FOR HUMAN PAPILLOMA VIRUS (HPV) TYPES 6, 11, 16, AND 18 ADMINISTERED: ICD-10-CM

## 2019-11-25 DIAGNOSIS — Z23 NEED FOR VACCINATION: ICD-10-CM

## 2019-11-25 PROCEDURE — 99395 PREV VISIT EST AGE 18-39: CPT | Performed by: FAMILY MEDICINE

## 2019-11-25 PROCEDURE — 90651 9VHPV VACCINE 2/3 DOSE IM: CPT | Performed by: FAMILY MEDICINE

## 2019-11-25 PROCEDURE — 90471 IMMUNIZATION ADMIN: CPT | Performed by: FAMILY MEDICINE
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
## 2019-11-26 ENCOUNTER — EVALUATION (OUTPATIENT)
Dept: PHYSICAL THERAPY | Facility: CLINIC | Age: 24
End: 2019-11-26
Payer: COMMERCIAL

## 2019-11-26 ENCOUNTER — TELEPHONE (OUTPATIENT)
Dept: FAMILY MEDICINE CLINIC | Facility: CLINIC | Age: 24
End: 2019-11-26

## 2019-11-26 DIAGNOSIS — M23.8X2 ACL LAXITY, LEFT: Primary | ICD-10-CM

## 2019-11-26 PROCEDURE — 97110 THERAPEUTIC EXERCISES: CPT | Performed by: PHYSICAL THERAPIST

## 2019-11-26 PROCEDURE — 97162 PT EVAL MOD COMPLEX 30 MIN: CPT | Performed by: PHYSICAL THERAPIST

## 2019-11-26 PROCEDURE — 97016 VASOPNEUMATIC DEVICE THERAPY: CPT | Performed by: PHYSICAL THERAPIST

## 2019-11-26 NOTE — ASSESSMENT & PLAN NOTE
Normal physical exam with concern for female sexual dysfunction possibly secondary to possible child andersen trama    Behavior referral focused on sexual dysfunction   Up to date on immunization   No indication for blood work at this time

## 2019-11-26 NOTE — PROGRESS NOTES
PT Evaluation     Today's date: 2019  Patient name: Dave Tinoco  : 1995  MRN: [id number]  Referring provider: Kalia Iyer MD  Dx:   Encounter Diagnosis     ICD-10-CM    1  ACL laxity, left M23 8X2 Ambulatory referral to Physical Therapy                  Assessment  Assessment details: Dave Tinoco is a pleasant 30 y o  female who presents with signs and symptoms correlating with referring diagnosis as well as concerns for meniscal pathology as she presents with 5/5 composite score for meniscus involvement  No further referral appears necessary at this time based upon examination results, but will be monitored for progress in the next two weeks to see if she should be referred to ortho  The patient's greatest concerns are playing with her kid without pain and keeping up with him running and playing on the ground  She presents with a movement impairment diagnosis of hypomobile knee flexion  This also presents with decreased knee A/PROM, tolerance to knee/hip strength isometrics, pain with most functional activities, reliance on the brace, and pain with most meniscal/ ligamentous testing 2/2 guarding  Negative prognostic indicators: needs to stand for long periods of time at work  Positive prognostic indicators: good motivation and outlook on injury  Please contact me if you have any further questions or recommendations  Thank you very much for the kind referral       Impairments: abnormal or restricted ROM, abnormal movement, activity intolerance, impaired balance, impaired physical strength and pain with function    Symptom irritability: moderateUnderstanding of Dx/Px/POC: good   Prognosis: good    Goals  STGs  1  Decrease pain by 20% in 2-4 weeks  2  Improve knee ROM by 10 degrees in 2-4 weeks  3  Improve hip and knee strength by 1/3 grade in 2-4 weeks  LTGs  1  Decrease pain by 60% in 6-8 weeks  2  Improve walking tolerance to >30 minutes in 6-8 weeks     3  Perform job activities without pain in 6-8 weeks  Plan  Patient would benefit from: skilled physical therapy  Referral necessary: No  Planned modality interventions: cryotherapy, TENS and thermotherapy: hydrocollator packs  Planned therapy interventions: manual therapy, therapeutic training, stretching, strengthening, therapeutic activities, therapeutic exercise, patient education, activity modification, neuromuscular re-education and balance  Frequency: 2x week  Duration in weeks: 8  Treatment plan discussed with: patient        Subjective Evaluation    History of Present Illness  Mechanism of injury: Pt is a 30 y o  female presenting w/ knee pain following insidious onset 1 year ago but has worsened in the last 2 months  She states that the knee is clicking and popping after she straightens the knee and is unable to move it too well following this  Pt is also stating that she has this occasional locking sensation in the knee unable to bend it without external forces assisting her  She states that her MD recommended weight loss to help with decreasing the pressure in the knee       Neurological signs: locking sensation from thigh to calf   Red Flags: none  PMH: h/o back pain and scoliosis           Recurrent probem    Quality of life: good    Pain  Current pain ratin  At best pain ratin  At worst pain rating: 10  Location: posterior lateral aspect of knee  Quality: sharp and discomfort  Relieving factors: ice and heat  Aggravating factors: standing, walking and stair climbing    Social Support  Steps to enter house: yes (3 flight)  Lives in: apartment  Lives with: significant other and young children    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 )  Patient Goals  Patient goals for therapy: improved balance, increased strength, decreased pain and increased motion          Objective     Active Range of Motion   Left Knee   Flexion: 78 degrees with pain  Extension: -5 degrees with pain    Right Knee Flexion: 125 degrees   Extension: 0 degrees     Additional Active Range of Motion Details  Observation: pt presents with knee medial lateral immobilizer today   Gait: Pt has an antalgic gait pattern with fear of placing WBing into LLE  Functional squat: 90 degrees with less pain with brace, without brace 50 degrees with pain  Balance SLS 20 sec R no sway L 10 seconds mod sway with pain  SLR: pain w/ hip flexion limited to 60 degrees L side  Meniscal Pathology Composite Score: 5/5  Hx of locking/catching:+  Joint line tenderness: + Pain with hyperextension:+  Pain with hyperflexion:+  All:+  Thessaly's : positive medial turn and increased pain with lateral  Lachmans: pain Posterior drawer:- Anterior drawer: pain MCL0: pain  MCL30: pain  LCL0: -LCL30:-  Squeeze test:   GERSON: Normal patellar mobility with pain during palpation to Lateral side      Passive Range of Motion   Left Knee   Flexion: 110 degrees with pain  Extension: 0 degrees with pain    Strength/Myotome Testing     Left Hip   Planes of Motion   Flexion: 3+  External rotation: 3+  Internal rotation: 4-    Right Hip   Planes of Motion   Flexion: 4  External rotation: 4  Internal rotation: 4    Left Knee   Flexion: 3+  Extension: 3+  Quadriceps contraction: fair    Right Knee   Flexion: 4+  Extension: 4+      Flowsheet Rows      Most Recent Value   PT/OT G-Codes   Current Score  46   Projected Score  61             Precautions: heart murmur     Daily Treatment Diary     Date 11/26            FOTO IE             Re-eval IE              Manuals 11/26         PROM of knee [facility]                                                  Exercise Diary 11/26         Bike          Heel Slides HEP         Quad sets HEP         SLR Flex with brace HEP         SLR abd          Hip ext standing          Glut set          SLR ext           Bridge if nicole          W/S          SAQ                              Progress to           S/L treadmill           SLS          Side step Modalities 11/26         [employer] 15'

## 2019-11-27 ENCOUNTER — TELEPHONE (OUTPATIENT)
Dept: FAMILY MEDICINE CLINIC | Facility: CLINIC | Age: 24
End: 2019-11-27

## 2019-12-02 ENCOUNTER — OFFICE VISIT (OUTPATIENT)
Dept: URGENT CARE | Age: 24
End: 2019-12-02
Payer: COMMERCIAL

## 2019-12-02 VITALS
TEMPERATURE: 98 F | DIASTOLIC BLOOD PRESSURE: 64 MMHG | RESPIRATION RATE: 18 BRPM | WEIGHT: 180 LBS | HEART RATE: 80 BPM | BODY MASS INDEX: 29.05 KG/M2 | SYSTOLIC BLOOD PRESSURE: 116 MMHG | OXYGEN SATURATION: 100 %

## 2019-12-02 DIAGNOSIS — L02.415 ABSCESS OF RIGHT LEG: Primary | ICD-10-CM

## 2019-12-02 PROCEDURE — 87070 CULTURE OTHR SPECIMN AEROBIC: CPT | Performed by: NURSE PRACTITIONER

## 2019-12-02 PROCEDURE — G0382 LEV 3 HOSP TYPE B ED VISIT: HCPCS | Performed by: NURSE PRACTITIONER

## 2019-12-02 PROCEDURE — 99213 OFFICE O/P EST LOW 20 MIN: CPT | Performed by: NURSE PRACTITIONER

## 2019-12-02 PROCEDURE — 87205 SMEAR GRAM STAIN: CPT | Performed by: NURSE PRACTITIONER

## 2019-12-02 PROCEDURE — 99283 EMERGENCY DEPT VISIT LOW MDM: CPT | Performed by: NURSE PRACTITIONER

## 2019-12-02 PROCEDURE — 87186 SC STD MICRODIL/AGAR DIL: CPT | Performed by: NURSE PRACTITIONER

## 2019-12-02 RX ORDER — SULFAMETHOXAZOLE AND TRIMETHOPRIM 800; 160 MG/1; MG/1
1 TABLET ORAL EVERY 12 HOURS SCHEDULED
Qty: 20 TABLET | Refills: 0 | Status: SHIPPED | OUTPATIENT
Start: 2019-12-02 | End: 2019-12-12

## 2019-12-03 ENCOUNTER — APPOINTMENT (OUTPATIENT)
Dept: PHYSICAL THERAPY | Facility: CLINIC | Age: 24
End: 2019-12-03
Payer: COMMERCIAL

## 2019-12-03 NOTE — PROGRESS NOTES
NAME: Estrella Cooper is a 30 y o  female  : 1995    MRN: [id number]    /64   Pulse 80   Temp 98 °F (36 7 °C)   Resp 18   Wt 81 6 kg (180 lb)   LMP 2019 (Exact Date)   SpO2 100%   BMI 29 05 kg/m²     Assessment and Plan   Abscess of right leg [L02 415]  1  Abscess of right leg  Wound culture and Gram stain    sulfamethoxazole-trimethoprim (BACTRIM DS) 800-160 mg per tablet       Tiny Smyth was seen today for mass  Diagnoses and all orders for this visit:    Abscess of right leg  -     Wound culture and Gram stain  -     sulfamethoxazole-trimethoprim (BACTRIM DS) 800-160 mg per tablet; Take 1 tablet by mouth every 12 (twelve) hours for 10 days        Patient Instructions   Patient Instructions   Take meds as directed  Warm compresses to the area  Tylenol and motrin for fever and pain   If worse go to the ER  Call in 1-2 days for wound culture results  Abscess   WHAT YOU NEED TO KNOW:   A warm compress may help your abscess drain  Your healthcare provider may make a cut in the abscess so it can drain  You may need surgery to remove an abscess that is on your hands or buttocks  DISCHARGE INSTRUCTIONS:   Return to the emergency department if:   · The area around your abscess becomes very painful, warm, or has red streaks  · You have a fever and chills  · Your heart is beating faster than usual      · You feel faint or confused  Contact your healthcare provider if:   · Your abscess gets bigger or does not get better  · Your abscess returns  · You have questions or concerns about your condition or care  Medicines: You may  need any of the following:  · Antibiotics  help treat a bacterial infection  · Acetaminophen  decreases pain and fever  It is available without a doctor's order  Ask how much to take and how often to take it  Follow directions  Acetaminophen can cause liver damage if not taken correctly      · NSAIDs , such as ibuprofen, help decrease swelling, pain, and fever  This medicine is available with or without a doctor's order  NSAIDs can cause stomach bleeding or kidney problems in certain people  If you take blood thinner medicine, always ask your healthcare provider if NSAIDs are safe for you  Always read the medicine label and follow directions  · Take your medicine as directed  Contact your healthcare provider if you think your medicine is not helping or if you have side effects  Tell him or her if you are allergic to any medicine  Keep a list of the medicines, vitamins, and herbs you take  Include the amounts, and when and why you take them  Bring the list or the pill bottles to follow-up visits  Carry your medicine list with you in case of an emergency  Self-care:   · Apply a warm compress to your abscess  This will help it open and drain  Wet a washcloth in warm, but not hot, water  Apply the compress for 10 minutes  Repeat this 4 times each day  Do not  press on an abscess or try to open it with a needle  You may push the bacteria deeper or into your blood  · Do not share your clothes, towels, or sheets with anyone  This can spread the infection to others  · Wash your hands often  This can help prevent the spread of germs  Use soap and water or an alcohol-based hand rub  Care for your wound after it is drained:   · Care for your wound as directed  If your healthcare provider says it is okay, carefully remove the bandage and gauze packing  You may need to soak the gauze to get it out of your wound  Clean your wound and the area around it as directed  Dry the area and put on new, clean bandages  Change your bandages when they get wet or dirty  · Ask your healthcare provider how to change the gauze in your wound  Keep track of how many pieces of gauze are placed inside the wound  Do not put too much packing in the wound  Do not pack the gauze too tightly in your wound  Follow up with your healthcare provider in 1 to 3 days:   You may need to have your packing removed or your bandage changed  Write down your questions so you remember to ask them during your visits  © 2017 [employer] Information is for End User's use only and may not be sold, redistributed or otherwise used for commercial purposes  All illustrations and images included in CareNotes® are the copyrighted property of A [employer] M [employer]  or [employer]  The above information is an  only  It is not intended as medical advice for individual conditions or treatments  Talk to your doctor, nurse or pharmacist before following any medical regimen to see if it is safe and effective for you  Proceed to ER if symptoms worsen  Chief Complaint     Chief Complaint   Patient presents with    Mass     inner right thigh, 4 days ago, started as a little bubble now progressed to a open wound, puss is coming out for over 3 days          History of Present Illness     29 yo female here today for possible abscess in the mid thigh area  Pt states that it has been there for three days and getting worse  She ahs had this in the past and has been dx with abscess  This was a few months ago  She was placed on antibiotics and it cleared but, denies every needing surgery for the wound  Review of Systems   Review of Systems   Skin: Positive for wound (right inner thigh abscess)           Current Medications       Current Outpatient Medications:     desogestrel-ethinyl estradiol (APRI) 0 15-30 MG-MCG per tablet, Take 1 tablet by mouth daily, Disp: , Rfl:     fluticasone (FLONASE) 50 mcg/act nasal spray, 1 spray into each nostril 2 (two) times a day, Disp: 16 g, Rfl: 2    loratadine (CLARITIN) 10 mg tablet, Take 1 tablet (10 mg total) by mouth daily, Disp: 30 tablet, Rfl: 1    montelukast (SINGULAIR) 10 mg tablet, Take 1 tablet (10 mg total) by mouth daily at bedtime, Disp: 90 tablet, Rfl: 3    oxybutynin (DITROPAN-XL) 10 MG 24 hr tablet, Take 10 mg by mouth daily, Disp: , Rfl: 3    pentosan polysulfate (ELMIRON) 100 mg capsule, 3 (three) times a day, Disp: , Rfl:     albuterol (VENTOLIN HFA) 90 mcg/act inhaler, Inhale 2 puffs every 4 (four) hours as needed for wheezing, Disp: 2 Inhaler, Rfl: 3    Elastic Bandages & Supports (KNEE BRACE/HINGED L/XL) MISC, by Does not apply route daily Her daily while awake and ambulating, Disp: 1 each, Rfl: 0    Ferrous Fumarate 324 (106 Fe) MG TABS, one tablet once a day, Disp: , Rfl:     sulfamethoxazole-trimethoprim (BACTRIM DS) 800-160 mg per tablet, Take 1 tablet by mouth every 12 (twelve) hours for 10 days, Disp: 20 tablet, Rfl: 0    Current Allergies     Allergies as of 12/02/2019 - Reviewed 12/02/2019   Allergen Reaction Noted    Eggs or egg-derived products Anaphylaxis 06/06/2018    Peanut oil Anaphylaxis 06/06/2018    Baking soda-fluoride [sodium fluoride] Swelling 08/10/2018    Benzocaine Swelling 06/06/2018    Aloe vera Hives, Itching, and Rash 04/25/2019              Past Medical History:   Diagnosis Date    Anemia     Asthma     Heart murmur        History reviewed  No pertinent surgical history  History reviewed  No pertinent family history  Medications have been verified  The following portions of the patient's history were reviewed and updated as appropriate: allergies, current medications, past family history, past medical history, past social history, past surgical history and problem list     Objective   /64   Pulse 80   Temp 98 °F (36 7 °C)   Resp 18   Wt 81 6 kg (180 lb)   LMP 11/08/2019 (Exact Date)   SpO2 100%   BMI 29 05 kg/m²      Physical Exam     Physical Exam   Constitutional: She appears well-developed and well-nourished  No distress  Skin: Skin is warm  Capillary refill takes less than 2 seconds  No rash noted  Swab of the drainage taken and sent for a culture, pt placed on antibiotic     Belkis AprilKEVEN

## 2019-12-03 NOTE — PATIENT INSTRUCTIONS
Take meds as directed  Warm compresses to the area  Tylenol and motrin for fever and pain   If worse go to the ER  Call in 1-2 days for wound culture results  Abscess   WHAT YOU NEED TO KNOW:   A warm compress may help your abscess drain  Your healthcare provider may make a cut in the abscess so it can drain  You may need surgery to remove an abscess that is on your hands or buttocks  DISCHARGE INSTRUCTIONS:   Return to the emergency department if:   · The area around your abscess becomes very painful, warm, or has red streaks  · You have a fever and chills  · Your heart is beating faster than usual      · You feel faint or confused  Contact your healthcare provider if:   · Your abscess gets bigger or does not get better  · Your abscess returns  · You have questions or concerns about your condition or care  Medicines: You may  need any of the following:  · Antibiotics  help treat a bacterial infection  · Acetaminophen  decreases pain and fever  It is available without a doctor's order  Ask how much to take and how often to take it  Follow directions  Acetaminophen can cause liver damage if not taken correctly  · NSAIDs , such as ibuprofen, help decrease swelling, pain, and fever  This medicine is available with or without a doctor's order  NSAIDs can cause stomach bleeding or kidney problems in certain people  If you take blood thinner medicine, always ask your healthcare provider if NSAIDs are safe for you  Always read the medicine label and follow directions  · Take your medicine as directed  Contact your healthcare provider if you think your medicine is not helping or if you have side effects  Tell him or her if you are allergic to any medicine  Keep a list of the medicines, vitamins, and herbs you take  Include the amounts, and when and why you take them  Bring the list or the pill bottles to follow-up visits   Carry your medicine list with you in case of an emergency  Self-care:   · Apply a warm compress to your abscess  This will help it open and drain  Wet a washcloth in warm, but not hot, water  Apply the compress for 10 minutes  Repeat this 4 times each day  Do not  press on an abscess or try to open it with a needle  You may push the bacteria deeper or into your blood  · Do not share your clothes, towels, or sheets with anyone  This can spread the infection to others  · Wash your hands often  This can help prevent the spread of germs  Use soap and water or an alcohol-based hand rub  Care for your wound after it is drained:   · Care for your wound as directed  If your healthcare provider says it is okay, carefully remove the bandage and gauze packing  You may need to soak the gauze to get it out of your wound  Clean your wound and the area around it as directed  Dry the area and put on new, clean bandages  Change your bandages when they get wet or dirty  · Ask your healthcare provider how to change the gauze in your wound  Keep track of how many pieces of gauze are placed inside the wound  Do not put too much packing in the wound  Do not pack the gauze too tightly in your wound  Follow up with your healthcare provider in 1 to 3 days: You may need to have your packing removed or your bandage changed  Write down your questions so you remember to ask them during your visits  © 2017 [employer] Information is for End User's use only and may not be sold, redistributed or otherwise used for commercial purposes  All illustrations and images included in CareNotes® are the copyrighted property of A [employer] M , Inc  or [employer]  The above information is an  only  It is not intended as medical advice for individual conditions or treatments  Talk to your doctor, nurse or pharmacist before following any medical regimen to see if it is safe and effective for you

## 2019-12-05 ENCOUNTER — TELEPHONE (OUTPATIENT)
Dept: URGENT CARE | Age: 24
End: 2019-12-05

## 2019-12-05 LAB
BACTERIA WND AEROBE CULT: ABNORMAL
GRAM STN SPEC: ABNORMAL
GRAM STN SPEC: ABNORMAL

## 2019-12-05 NOTE — TELEPHONE ENCOUNTER
Patient aware of the bacteria found and to continue the antibiotic that she was prescribed and to finish the whole course  Pt verbalized understanding     KEVEN Ramirez

## 2019-12-06 ENCOUNTER — OFFICE VISIT (OUTPATIENT)
Dept: PHYSICAL THERAPY | Facility: CLINIC | Age: 24
End: 2019-12-06
Payer: COMMERCIAL

## 2019-12-06 DIAGNOSIS — M23.8X2 ACL LAXITY, LEFT: Primary | ICD-10-CM

## 2019-12-06 PROCEDURE — 97112 NEUROMUSCULAR REEDUCATION: CPT | Performed by: PHYSICAL THERAPIST

## 2019-12-06 PROCEDURE — 97110 THERAPEUTIC EXERCISES: CPT | Performed by: PHYSICAL THERAPIST

## 2019-12-06 NOTE — PROGRESS NOTES
Daily Note     Today's date: 2019  Patient name: Josee Garber  : 1995  MRN: [id number]  Referring provider: Talib Lopez MD  Dx:   Encounter Diagnosis     ICD-10-CM    1  ACL laxity, left M23 8X2                   Subjective: Pt states she had an infected abscess requiring to miss the last appointment  She has been completing most activities depending on her LLE as pain has been on her RLE more  Stating she has felt better with the LLE overall  Objective: See treatment diary below      Assessment: Tolerated treatment well requiring cues for most interventions given for HEP and was requiring increased time to complete certain interventions such as double leg activities as she had increased pain on the RLE  Plan: Continue per plan of care        Precautions: heart murmur     Daily Treatment Diary     Date            FOTO IE             Re-eval IE              Manuals         PROM of knee [facility]                                                  Exercise Diary          Bike  6'        Heel Slides HEP 5"x10         Quad sets HEP 5"x10        SLR Flex with brace HEP 10x2        SLR abd  10x2        Hip ext standing  10x2        Glut set          SLR ext   NV        Bridge if nicole  10x2        W/S  10"x5        SAQ                              Progress to           S/L treadmill           SLS          Side step                                                  Modalities          Gameready 15'

## 2019-12-09 ENCOUNTER — TELEPHONE (OUTPATIENT)
Dept: FAMILY MEDICINE CLINIC | Facility: CLINIC | Age: 24
End: 2019-12-09

## 2019-12-10 ENCOUNTER — OFFICE VISIT (OUTPATIENT)
Dept: PHYSICAL THERAPY | Facility: CLINIC | Age: 24
End: 2019-12-10
Payer: COMMERCIAL

## 2019-12-10 DIAGNOSIS — M23.8X2 ACL LAXITY, LEFT: Primary | ICD-10-CM

## 2019-12-10 PROCEDURE — 97110 THERAPEUTIC EXERCISES: CPT

## 2019-12-10 PROCEDURE — 97112 NEUROMUSCULAR REEDUCATION: CPT

## 2019-12-10 NOTE — TELEPHONE ENCOUNTER
Patient states for that date she is unavailable  Patient states she can do 8:30AM but needs a different date

## 2019-12-13 ENCOUNTER — APPOINTMENT (OUTPATIENT)
Dept: PHYSICAL THERAPY | Facility: CLINIC | Age: 24
End: 2019-12-13
Payer: COMMERCIAL

## 2019-12-16 ENCOUNTER — TELEPHONE (OUTPATIENT)
Dept: FAMILY MEDICINE CLINIC | Facility: CLINIC | Age: 24
End: 2019-12-16

## 2019-12-16 ENCOUNTER — SOCIAL WORK (OUTPATIENT)
Dept: FAMILY MEDICINE CLINIC | Facility: CLINIC | Age: 24
End: 2019-12-16
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
## 2019-12-17 ENCOUNTER — OFFICE VISIT (OUTPATIENT)
Dept: PHYSICAL THERAPY | Facility: CLINIC | Age: 24
End: 2019-12-17
Payer: COMMERCIAL

## 2019-12-17 DIAGNOSIS — M23.8X2 ACL LAXITY, LEFT: Primary | ICD-10-CM

## 2019-12-17 PROCEDURE — 97110 THERAPEUTIC EXERCISES: CPT

## 2019-12-17 PROCEDURE — 97530 THERAPEUTIC ACTIVITIES: CPT

## 2019-12-17 PROCEDURE — 97112 NEUROMUSCULAR REEDUCATION: CPT

## 2019-12-17 NOTE — PROGRESS NOTES
Daily Note     Today's date: 2019  Patient name: Christen Luna  : 1995  MRN: [id number]  Referring provider: Pedro Urena MD  Dx:   Encounter Diagnosis     ICD-10-CM    1  ACL laxity, left M23 8X2                   Subjective: Pt presents to PT with no brace on L LE secondary to the brace broke on Saturday and was unwearable  Pt states she is picking up new brace today  Pt reports pain in L knee grading the pain a 7/10 medial and lateral L knee  Pt denies increased pain post PT session  Objective: See treatment diary below      Assessment: Tolerated treatment well  Pt demonstrates appropriate challenge with TE and add clamshells for increasing glute strength  Patient demonstrated fatigue post treatment, exhibited good technique with therapeutic exercises and would benefit from continued PT  Plan: Continue per plan of care        Precautions: heart murmur     Daily Treatment Diary     Date 11/26 12/6 12/10 12/17         FOTO IE             Re-eval IE              Manuals 11/26 12/6 12/10 12/17         PROM of knee [address]                                                                  Exercise Diary 11/26  12/10 12/17         Bike  6' 6' 6'         Heel Slides HEP 5"x10  5"x10  5"x10          Quad sets HEP 5"x10 5"x15 5"x15         SLR Flex with brace HEP 10x2 NV NV         SLR abd  10x2 10 x 2 10 x 2         Hip ext standing  10x2 10x2          Clamshells    5" x 15         SLR ext   NV NV          Bridge if nicole  10x2 10x2 10x2         W/S  10"x5 NV nv         SAQ   3" x 15 3" x 15                                   Progress to              S/L treadmill              SLS             Side step                                                                 Modalities 11/26  12/10 12/17         Gameready 15'

## 2019-12-20 ENCOUNTER — OFFICE VISIT (OUTPATIENT)
Dept: PHYSICAL THERAPY | Facility: CLINIC | Age: 24
End: 2019-12-20
Payer: COMMERCIAL

## 2019-12-20 DIAGNOSIS — M23.8X2 ACL LAXITY, LEFT: Primary | ICD-10-CM

## 2019-12-20 PROCEDURE — 97112 NEUROMUSCULAR REEDUCATION: CPT | Performed by: PHYSICAL THERAPIST

## 2019-12-20 PROCEDURE — 97110 THERAPEUTIC EXERCISES: CPT | Performed by: PHYSICAL THERAPIST

## 2019-12-20 PROCEDURE — 97530 THERAPEUTIC ACTIVITIES: CPT | Performed by: PHYSICAL THERAPIST

## 2019-12-20 NOTE — PROGRESS NOTES
Daily Note     Today's date: 2019  Patient name: Milo Essex  : 1995  MRN: [id number]  Referring provider: Sabi Beckwith MD  Dx:   Encounter Diagnosis     ICD-10-CM    1  ACL laxity, left M23 8X2                   Subjective: Pt states that she is feeling alright lately but is overall having a little more issues as she has not been wearing the brace for the last few days as her previous one broke  Objective: See treatment diary below      Assessment: Tolerated treatment fair at this time wit increased pain during SLS and SLR today  She states that the pain radiates across the joint line at times, especially during SLS and step ups  She will continue to work on stability at this time  Plan: Continue per plan of care        Precautions: heart murmur     Daily Treatment Diary     Date 11/26 12/6 12/10 12/17 12/20        FOTO IE     NV        Re-eval IE              Manuals 11/26 12/6 12/10 12/17 12/20        PROM of knee [facility]                                                                 Exercise Diary 11/26  12/10 12/17 12/20        Bike  6' 6' 6' 5'        Heel Slides HEP 5"x10  5"x10  5"x10          Quad sets HEP 5"x10 5"x15 5"x15         SLR Flex with brace HEP 10x2 NV NV 2x10 with pain        SLR abd  10x2 10 x 2 10 x 2 2x10         Hip ext standing  10x2 10x2  2x10         Clamshells    5" x 15         SLR ext   NV NV  standing 2x10         Bridge if nicole  10x2 10x2 10x2 2x10        W/S  10"x5 NV nv          Mini squat     15x        Step up     10x                      Progress to              S/L treadmill              SLS     10x10"        Side step                                                                 Modalities 11/26  12/10 12/17         Gameready 15'

## 2019-12-24 ENCOUNTER — APPOINTMENT (OUTPATIENT)
Dept: PHYSICAL THERAPY | Facility: CLINIC | Age: 24
End: 2019-12-24
Payer: COMMERCIAL

## 2019-12-27 ENCOUNTER — OFFICE VISIT (OUTPATIENT)
Dept: PHYSICAL THERAPY | Facility: CLINIC | Age: 24
End: 2019-12-27
Payer: COMMERCIAL

## 2019-12-27 DIAGNOSIS — M23.8X2 ACL LAXITY, LEFT: Primary | ICD-10-CM

## 2019-12-27 PROCEDURE — 97112 NEUROMUSCULAR REEDUCATION: CPT

## 2019-12-27 PROCEDURE — 97530 THERAPEUTIC ACTIVITIES: CPT

## 2019-12-27 PROCEDURE — 97110 THERAPEUTIC EXERCISES: CPT

## 2019-12-27 NOTE — PROGRESS NOTES
Daily Note     Today's date: 2019  Patient name: Alaine Denver  : 1995  MRN: [id number]  Referring provider: Radha Guzmán MD  Dx:   Encounter Diagnosis     ICD-10-CM    1  ACL laxity, left M23 8X2                   Subjective: Pt presents to PT reporting HEP easier to performing noting less pain but states she still struggles with strength  Objective: See treatment diary below, pt able to achieve 85 AROM and 110 PROM and has MMT of 4-/5 for ext and flexion of knee  Overall decreased ability to tolerate AROM for knee flexion  - per DPT [address]      Assessment: Tolerated treatment well  Will issue pt an updated HEP and have permission to send it to her personal e-mail  Pt demonstrates improvement with SLR's and progressions with cuing for correct technique and to decrease quad lag  Patient demonstrated fatigue post treatment, exhibited good technique with therapeutic exercises and would benefit from continued PT to increase strength and stability  Plan: Continue per plan of care        Precautions: heart murmur     Daily Treatment Diary     Date 11/26 12/6 12/10 12/17 12/20 12/27       FOTO IE     NV    X       Re-eval IE              Manuals 11/26 12/6 12/10 12/17 12/20 12/27       PROM of knee [address]                                                                 Exercise Diary 11/26  12/10 12/17 12/20 12/27       Bike  6' 6' 6' 5' 5'       Heel Slides HEP 5"x10  5"x10  5"x10          Quad sets HEP 5"x10 5"x15 5"x15         SLR Flex with brace HEP 10x2 NV NV 2x10 with pain 2 x 10 w/o brace       SLR abd  10x2 10 x 2 10 x 2 2x10  2 x 10 w/o brace       Hip ext standing  10x2 10x2  2x10  - - - - -   Clamshells    5" x 15         SLR ext   NV NV  standing 2x10  Prone 2x10        Bridge if nicole  10x2 10x2 10x2 2x10        W/S  10"x5 NV nv   2 x 10       Mini squat     15x        Step up     10x         Heel Raises      NV       Progress to              S/L treadmill       3'       SLS     10x10" Side step                                                                 Modalities 11/26  12/10 12/17  12/27       Gameready 15'

## 2020-01-06 ENCOUNTER — OFFICE VISIT (OUTPATIENT)
Dept: FAMILY MEDICINE CLINIC | Facility: CLINIC | Age: 25
End: 2020-01-06

## 2020-01-06 VITALS
RESPIRATION RATE: 18 BRPM | HEART RATE: 84 BPM | OXYGEN SATURATION: 98 % | TEMPERATURE: 97.2 F | WEIGHT: 179 LBS | SYSTOLIC BLOOD PRESSURE: 92 MMHG | DIASTOLIC BLOOD PRESSURE: 60 MMHG | BODY MASS INDEX: 28.77 KG/M2 | HEIGHT: 66 IN

## 2020-01-06 DIAGNOSIS — M23.8X2 ACL LAXITY, LEFT: Primary | ICD-10-CM

## 2020-01-06 PROCEDURE — 99213 OFFICE O/P EST LOW 20 MIN: CPT | Performed by: FAMILY MEDICINE

## 2020-01-06 PROCEDURE — 3008F BODY MASS INDEX DOCD: CPT | Performed by: FAMILY MEDICINE

## 2020-01-06 RX ORDER — NAPROXEN 500 MG/1
500 TABLET ORAL 2 TIMES DAILY WITH MEALS
Qty: 10 TABLET | Refills: 0 | Status: SHIPPED | OUTPATIENT
Start: 2020-01-06 | End: 2020-09-30 | Stop reason: ALTCHOICE

## 2020-01-06 NOTE — ASSESSMENT & PLAN NOTE
Pain is improving with PT and knee brace   has not used antiinflammatory or pain meds   No trauma and no inciting incident no imaging indicated at this time   Continue PT for strengthening, and then continue to use what she learned at home   Weight reduction  Knee brace  with medial and lateral support   If all the above fail will consider imaging   Start neproxin BID for 5 day for inflammation
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
## 2020-01-07 ENCOUNTER — TELEPHONE (OUTPATIENT)
Dept: FAMILY MEDICINE CLINIC | Facility: CLINIC | Age: 25
End: 2020-01-07

## 2020-01-07 ENCOUNTER — SOCIAL WORK (OUTPATIENT)
Dept: FAMILY MEDICINE CLINIC | Facility: CLINIC | Age: 25
End: 2020-01-07
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
## 2020-01-16 NOTE — PROGRESS NOTES
Discharge Note    Rafael Fletcher will be discharged post 1/16/2020 as they are non-compliant with attendance at this time according to our attendance policy  They will be removed from all further appointments at this time till patient receives new script to return to therapy  Pt may call for any questions or concerns they may have at this time

## 2020-01-27 ENCOUNTER — DOCUMENTATION (OUTPATIENT)
Dept: FAMILY MEDICINE CLINIC | Facility: CLINIC | Age: 25
End: 2020-01-27

## 2020-01-27 NOTE — PROGRESS NOTES
Patient called to cancel appointment and does not want to reschedule  I am closing this referral  I remain available for consultation

## 2020-01-29 ENCOUNTER — OFFICE VISIT (OUTPATIENT)
Dept: URGENT CARE | Age: 25
End: 2020-01-29
Payer: COMMERCIAL

## 2020-01-29 VITALS
WEIGHT: 182 LBS | HEART RATE: 88 BPM | DIASTOLIC BLOOD PRESSURE: 60 MMHG | BODY MASS INDEX: 28.56 KG/M2 | RESPIRATION RATE: 18 BRPM | TEMPERATURE: 97 F | OXYGEN SATURATION: 100 % | SYSTOLIC BLOOD PRESSURE: 128 MMHG | HEIGHT: 67 IN

## 2020-01-29 DIAGNOSIS — J02.9 SORE THROAT: ICD-10-CM

## 2020-01-29 DIAGNOSIS — J02.9 VIRAL PHARYNGITIS: Primary | ICD-10-CM

## 2020-01-29 LAB — S PYO AG THROAT QL: NEGATIVE

## 2020-01-29 PROCEDURE — 99213 OFFICE O/P EST LOW 20 MIN: CPT | Performed by: NURSE PRACTITIONER

## 2020-01-29 PROCEDURE — 99283 EMERGENCY DEPT VISIT LOW MDM: CPT | Performed by: NURSE PRACTITIONER

## 2020-01-29 PROCEDURE — 87070 CULTURE OTHR SPECIMN AEROBIC: CPT | Performed by: NURSE PRACTITIONER

## 2020-01-29 PROCEDURE — 87880 STREP A ASSAY W/OPTIC: CPT | Performed by: NURSE PRACTITIONER

## 2020-01-29 PROCEDURE — G0382 LEV 3 HOSP TYPE B ED VISIT: HCPCS | Performed by: NURSE PRACTITIONER

## 2020-01-29 NOTE — PROGRESS NOTES
NAME: Sabine Ledesma is a 30 y o  female  : 1995    MRN: [id number]    /60 (BP Location: Right arm, Patient Position: Sitting, Cuff Size: Adult)   Pulse 88   Temp (!) 97 °F (36 1 °C) (Tympanic)   Resp 18   Ht 5' 6 5" (1 689 m)   Wt 82 6 kg (182 lb)   SpO2 100%   BMI 28 94 kg/m²     Assessment and Plan   Viral pharyngitis [J02 9]  1  Viral pharyngitis     2  Sore throat  POCT rapid strepA    Throat culture       Carolina Saini was seen today for sore throat  Diagnoses and all orders for this visit:    Viral pharyngitis    Sore throat  -     POCT rapid strepA  -     Throat culture    rapid strep neg, sent for culture    Patient Instructions   Patient Instructions   Follow up with pcp  Take meds as directed   Warm salt gargles  Increase fluids    Take zyrtec or allegra for symptoms along with flonase    Rapid strep was negative today and sent for a culture  Call in 2-3 days for throat culture results  Take tylenol and motrin for fever and pain           Proceed to ER if symptoms worsen  Chief Complaint     Chief Complaint   Patient presents with    Sore Throat     for the past month sore throat and swollen glands on and off   this morning, pt noticed white spots on back of throat  History of Present Illness     Pt here today with a sore throat, intermittently for one month and then some days it is worse  She woke up with a lot of pain and has a raspy voice and then some days it comes back  It hurts to swallow however  She has been taking halls over the counter and nothing else  She has taken motrin intermittently  No fevers  Review of Systems   Review of Systems   Constitutional: Negative for chills, fatigue and fever  HENT: Positive for postnasal drip and sore throat  Negative for congestion, ear pain, rhinorrhea, sinus pressure and sinus pain  Respiratory: Negative for cough  Cardiovascular: Negative  Gastrointestinal: Negative  Musculoskeletal: Negative  Current Medications       Current Outpatient Medications:     albuterol (VENTOLIN HFA) 90 mcg/act inhaler, Inhale 2 puffs every 4 (four) hours as needed for wheezing, Disp: 2 Inhaler, Rfl: 3    desogestrel-ethinyl estradiol (APRI) 0 15-30 MG-MCG per tablet, Take 1 tablet by mouth daily, Disp: , Rfl:     Elastic Bandages & Supports (KNEE BRACE/HINGED L/XL) MISC, by Does not apply route daily Her daily while awake and ambulating, Disp: 1 each, Rfl: 0    fluticasone (FLONASE) 50 mcg/act nasal spray, 1 spray into each nostril 2 (two) times a day, Disp: 16 g, Rfl: 2    loratadine (CLARITIN) 10 mg tablet, Take 1 tablet (10 mg total) by mouth daily, Disp: 30 tablet, Rfl: 1    montelukast (SINGULAIR) 10 mg tablet, Take 1 tablet (10 mg total) by mouth daily at bedtime, Disp: 90 tablet, Rfl: 3    oxybutynin (DITROPAN-XL) 10 MG 24 hr tablet, Take 10 mg by mouth daily, Disp: , Rfl: 3    pentosan polysulfate (ELMIRON) 100 mg capsule, 3 (three) times a day, Disp: , Rfl:     Ferrous Fumarate 324 (106 Fe) MG TABS, one tablet once a day, Disp: , Rfl:     naproxen (NAPROSYN) 500 mg tablet, Take 1 tablet (500 mg total) by mouth 2 (two) times a day with meals (Patient not taking: Reported on 1/29/2020), Disp: 10 tablet, Rfl: 0    Current Allergies     Allergies as of 01/29/2020 - Reviewed 01/29/2020   Allergen Reaction Noted    Eggs or egg-derived products Anaphylaxis 06/06/2018    Peanut oil Anaphylaxis 06/06/2018    Baking soda-fluoride [sodium fluoride] Swelling 08/10/2018    Benzocaine Swelling 06/06/2018    Aloe vera Hives, Itching, and Rash 04/25/2019              Past Medical History:   Diagnosis Date    Anemia     Asthma     Heart murmur        History reviewed  No pertinent surgical history  History reviewed  No pertinent family history  Medications have been verified      The following portions of the patient's history were reviewed and updated as appropriate: allergies, current medications, past family history, past medical history, past social history, past surgical history and problem list     Objective   /60 (BP Location: Right arm, Patient Position: Sitting, Cuff Size: Adult)   Pulse 88   Temp (!) 97 °F (36 1 °C) (Tympanic)   Resp 18   Ht 5' 6 5" (1 689 m)   Wt 82 6 kg (182 lb)   SpO2 100%   BMI 28 94 kg/m²      Physical Exam     Physical Exam   Constitutional: She appears well-developed and well-nourished  She is cooperative  She does not appear ill  HENT:   Head: Normocephalic  Right Ear: Hearing, tympanic membrane, external ear and ear canal normal    Left Ear: Hearing, tympanic membrane, external ear and ear canal normal    Nose: Mucosal edema present  Right sinus exhibits no maxillary sinus tenderness  Left sinus exhibits no maxillary sinus tenderness  Mouth/Throat: Uvula is midline and mucous membranes are normal  No posterior oropharyngeal edema or posterior oropharyngeal erythema  Tonsils are 0 on the right  Tonsils are 0 on the left  No tonsillar exudate  Eyes: Pupils are equal, round, and reactive to light  Conjunctivae and EOM are normal    Neck: Normal range of motion  No erythema present  No thyroid mass present  Cardiovascular: Normal rate, regular rhythm and normal heart sounds  Pulmonary/Chest: Effort normal and breath sounds normal    Neurological: She is alert         Aisha Kinds, CRNP

## 2020-01-31 LAB — BACTERIA THROAT CULT: NORMAL

## 2020-09-24 ENCOUNTER — APPOINTMENT (EMERGENCY)
Dept: RADIOLOGY | Facility: HOSPITAL | Age: 25
End: 2020-09-24
Payer: COMMERCIAL

## 2020-09-24 ENCOUNTER — HOSPITAL ENCOUNTER (EMERGENCY)
Facility: HOSPITAL | Age: 25
Discharge: HOME | End: 2020-09-24
Attending: EMERGENCY MEDICINE | Admitting: EMERGENCY MEDICINE
Payer: COMMERCIAL

## 2020-09-24 ENCOUNTER — APPOINTMENT (EMERGENCY)
Dept: MRI IMAGING | Facility: HOSPITAL | Age: 25
End: 2020-09-24
Payer: COMMERCIAL

## 2020-09-24 ENCOUNTER — APPOINTMENT (EMERGENCY)
Dept: CT IMAGING | Facility: HOSPITAL | Age: 25
End: 2020-09-24
Payer: COMMERCIAL

## 2020-09-24 VITALS
BODY MASS INDEX: 31.09 KG/M2 | RESPIRATION RATE: 16 BRPM | OXYGEN SATURATION: 99 % | DIASTOLIC BLOOD PRESSURE: 60 MMHG | WEIGHT: 195.55 LBS | HEART RATE: 85 BPM | SYSTOLIC BLOOD PRESSURE: 110 MMHG | TEMPERATURE: 98.1 F

## 2020-09-24 DIAGNOSIS — M54.2 NECK PAIN: ICD-10-CM
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
## 2020-09-24 DIAGNOSIS — R20.2 ARM PARESTHESIA, LEFT: ICD-10-CM

## 2020-09-24 PROCEDURE — 72125 CT NECK SPINE W/O DYE: CPT

## 2020-09-24 PROCEDURE — G1004 CDSM NDSC: HCPCS

## 2020-09-24 PROCEDURE — NC001 PR NO CHARGE: Performed by: EMERGENCY MEDICINE

## 2020-09-24 PROCEDURE — 70450 CT HEAD/BRAIN W/O DYE: CPT

## 2020-09-24 PROCEDURE — 99284 EMERGENCY DEPT VISIT MOD MDM: CPT

## 2020-09-24 PROCEDURE — 72070 X-RAY EXAM THORAC SPINE 2VWS: CPT

## 2020-09-24 PROCEDURE — 73030 X-RAY EXAM OF SHOULDER: CPT

## 2020-09-24 PROCEDURE — 99284 EMERGENCY DEPT VISIT MOD MDM: CPT | Performed by: EMERGENCY MEDICINE

## 2020-09-24 PROCEDURE — 72141 MRI NECK SPINE W/O DYE: CPT

## 2020-09-24 PROCEDURE — 72100 X-RAY EXAM L-S SPINE 2/3 VWS: CPT

## 2020-09-24 RX ORDER — METHOCARBAMOL 500 MG/1
500 TABLET, FILM COATED ORAL EVERY 12 HOURS PRN
Qty: 14 TABLET | Refills: 0 | Status: SHIPPED | OUTPATIENT
Start: 2020-09-24 | End: 2020-12-22 | Stop reason: DRUGHIGH

## 2020-09-24 RX ORDER — IBUPROFEN 800 MG/1
800 TABLET ORAL EVERY 8 HOURS PRN
Qty: 21 TABLET | Refills: 0 | Status: SHIPPED | OUTPATIENT
Start: 2020-09-24 | End: 2021-12-15

## 2020-09-24 RX ORDER — ACETAMINOPHEN 325 MG/1
650 TABLET ORAL ONCE
Status: COMPLETED | OUTPATIENT
Start: 2020-09-24 | End: 2020-09-24

## 2020-09-24 RX ADMIN — ACETAMINOPHEN 650 MG: 325 TABLET ORAL at 14:46
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
## 2020-09-24 NOTE — Clinical Note
Elias Smart was seen and treated in our emergency department on 9/24/2020  Diagnosis:     Argenis Aceves  may return to work on return date  She may return on this date: 09/26/2020         If you have any questions or concerns, please don't hesitate to call        Ponce Anders RN    ______________________________           _______________          _______________  Hospital Representative                              Date                                Time

## 2020-09-24 NOTE — Clinical Note
Ina Duran was seen and treated in our emergency department on 9/24/2020  Diagnosis:     Blanka Rodrigues  may return to work on return date  She may return on this date: 09/26/2020         If you have any questions or concerns, please don't hesitate to call        Narendra Glez RN    ______________________________           _______________          _______________  Hospital Representative                              Date                                Time

## 2020-09-24 NOTE — ED NOTES
Spoke to [facility] from MRI  Stated to bring pt for MRI @ 20:15        Trevon Muro  09/24/20 1945

## 2020-09-25 ENCOUNTER — OFFICE VISIT (OUTPATIENT)
Dept: URGENT CARE | Age: 25
End: 2020-09-25
Payer: COMMERCIAL

## 2020-09-25 VITALS
RESPIRATION RATE: 18 BRPM | HEART RATE: 77 BPM | TEMPERATURE: 96.8 F | HEIGHT: 67 IN | WEIGHT: 194 LBS | BODY MASS INDEX: 30.45 KG/M2 | SYSTOLIC BLOOD PRESSURE: 113 MMHG | OXYGEN SATURATION: 98 % | DIASTOLIC BLOOD PRESSURE: 56 MMHG
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
## 2020-09-25 PROCEDURE — G0382 LEV 3 HOSP TYPE B ED VISIT: HCPCS | Performed by: PREVENTIVE MEDICINE
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
## 2020-09-25 NOTE — ED NOTES
Pt returned from [facility]        [facility]  09/24/20 2052
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
## 2020-09-28 ENCOUNTER — TELEPHONE (OUTPATIENT)
Dept: OTHER | Facility: OTHER | Age: 25
End: 2020-09-28

## 2020-09-28 ENCOUNTER — TRANSCRIBE ORDERS (OUTPATIENT)
Dept: NEUROSURGERY | Facility: CLINIC | Age: 25
End: 2020-09-28

## 2020-09-28 DIAGNOSIS — M54.2 NECK PAIN: Primary | ICD-10-CM
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
## 2020-09-30 ENCOUNTER — HOSPITAL ENCOUNTER (OUTPATIENT)
Dept: RADIOLOGY | Facility: HOSPITAL | Age: 25
Discharge: HOME | End: 2020-09-30
Payer: COMMERCIAL

## 2020-09-30 ENCOUNTER — CONSULT (OUTPATIENT)
Dept: NEUROSURGERY | Facility: CLINIC | Age: 25
End: 2020-09-30
Payer: COMMERCIAL

## 2020-09-30 ENCOUNTER — TRANSCRIBE ORDERS (OUTPATIENT)
Dept: RADIOLOGY | Facility: HOSPITAL | Age: 25
End: 2020-09-30

## 2020-09-30 VITALS
HEIGHT: 67 IN | DIASTOLIC BLOOD PRESSURE: 70 MMHG | SYSTOLIC BLOOD PRESSURE: 118 MMHG | WEIGHT: 196.3 LBS | BODY MASS INDEX: 30.81 KG/M2 | TEMPERATURE: 98 F

## 2020-09-30 DIAGNOSIS — M54.2 NECK PAIN: ICD-10-CM

## 2020-09-30 DIAGNOSIS — R53.1 LEFT-SIDED WEAKNESS: Primary | ICD-10-CM

## 2020-09-30 PROCEDURE — 72052 X-RAY EXAM NECK SPINE 6/>VWS: CPT

## 2020-09-30 PROCEDURE — 99203 OFFICE O/P NEW LOW 30 MIN: CPT | Performed by: NURSE PRACTITIONER
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
## 2020-10-07 ENCOUNTER — OFFICE VISIT (OUTPATIENT)
Dept: FAMILY MEDICINE CLINIC | Facility: CLINIC | Age: 25
End: 2020-10-07

## 2020-10-07 VITALS
SYSTOLIC BLOOD PRESSURE: 110 MMHG | TEMPERATURE: 98 F | OXYGEN SATURATION: 98 % | HEIGHT: 67 IN | WEIGHT: 195 LBS | DIASTOLIC BLOOD PRESSURE: 60 MMHG | BODY MASS INDEX: 30.61 KG/M2 | RESPIRATION RATE: 18 BRPM | HEART RATE: 96 BPM

## 2020-10-07 DIAGNOSIS — J45.20 MILD INTERMITTENT ASTHMA WITHOUT COMPLICATION: ICD-10-CM

## 2020-10-07 DIAGNOSIS — Z02.1 PHYSICAL EXAM, PRE-EMPLOYMENT: Primary | ICD-10-CM

## 2020-10-07 DIAGNOSIS — N30.10 INTERSTITIAL CYSTITIS: ICD-10-CM

## 2020-10-07 DIAGNOSIS — R09.82 POSTNASAL DRIP: ICD-10-CM

## 2020-10-07 DIAGNOSIS — Z11.1 TUBERCULOSIS SCREENING: ICD-10-CM

## 2020-10-07 PROBLEM — L02.211 ABDOMINAL WALL ABSCESS: Status: RESOLVED | Noted: 2018-08-17 | Resolved: 2020-10-07

## 2020-10-07 PROBLEM — S40.012A CONTUSION OF LEFT SHOULDER: Status: RESOLVED | Noted: 2019-06-21 | Resolved: 2020-10-07

## 2020-10-07 PROCEDURE — 86580 TB INTRADERMAL TEST: CPT

## 2020-10-07 PROCEDURE — 3008F BODY MASS INDEX DOCD: CPT | Performed by: FAMILY MEDICINE

## 2020-10-07 PROCEDURE — 99213 OFFICE O/P EST LOW 20 MIN: CPT | Performed by: FAMILY MEDICINE

## 2020-10-07 PROCEDURE — 1036F TOBACCO NON-USER: CPT | Performed by: FAMILY MEDICINE

## 2020-10-07 RX ORDER — LORATADINE 10 MG/1
10 TABLET ORAL DAILY
Qty: 30 TABLET | Refills: 1 | Status: SHIPPED | OUTPATIENT
Start: 2020-10-07 | End: 2021-03-05 | Stop reason: SDUPTHER

## 2020-10-07 RX ORDER — FLUTICASONE PROPIONATE 50 MCG
1 SPRAY, SUSPENSION (ML) NASAL DAILY
Qty: 16 G | Refills: 2 | Status: SHIPPED | OUTPATIENT
Start: 2020-10-07 | End: 2021-12-15 | Stop reason: SDUPTHER

## 2020-10-07 RX ORDER — ALBUTEROL SULFATE 90 UG/1
2 AEROSOL, METERED RESPIRATORY (INHALATION) EVERY 4 HOURS PRN
Qty: 2 INHALER | Refills: 3 | Status: SHIPPED | OUTPATIENT
Start: 2020-10-07 | End: 2021-12-15 | Stop reason: SDUPTHER

## 2020-10-09 LAB
INDURATION: 0 MM
TB SKIN TEST: NEGATIVE

## 2020-10-12 ENCOUNTER — TELEPHONE (OUTPATIENT)
Dept: FAMILY MEDICINE CLINIC | Facility: CLINIC | Age: 25
End: 2020-10-12

## 2020-10-12 ENCOUNTER — CLINICAL SUPPORT (OUTPATIENT)
Dept: FAMILY MEDICINE CLINIC | Facility: CLINIC | Age: 25
End: 2020-10-12

## 2020-10-12 DIAGNOSIS — Z11.1 ENCOUNTER FOR PPD SKIN TEST READING: Primary | ICD-10-CM

## 2020-10-16 ENCOUNTER — CLINICAL SUPPORT (OUTPATIENT)
Dept: FAMILY MEDICINE CLINIC | Facility: CLINIC | Age: 25
End: 2020-10-16

## 2020-10-16 DIAGNOSIS — Z11.1 ENCOUNTER FOR PPD TEST: Primary | ICD-10-CM

## 2020-10-16 PROCEDURE — 86580 TB INTRADERMAL TEST: CPT | Performed by: FAMILY MEDICINE

## 2020-10-19 ENCOUNTER — CLINICAL SUPPORT (OUTPATIENT)
Dept: FAMILY MEDICINE CLINIC | Facility: CLINIC | Age: 25
End: 2020-10-19

## 2020-10-19 DIAGNOSIS — Z11.1 ENCOUNTER FOR PPD SKIN TEST READING: Primary | ICD-10-CM

## 2020-10-19 LAB
INDURATION: 0 MM
TB SKIN TEST: NEGATIVE

## 2020-12-22 ENCOUNTER — TELEMEDICINE (OUTPATIENT)
Dept: NEUROLOGY | Facility: CLINIC | Age: 25
End: 2020-12-22
Payer: COMMERCIAL

## 2020-12-22 VITALS — HEIGHT: 67 IN | BODY MASS INDEX: 29.19 KG/M2 | WEIGHT: 186 LBS

## 2020-12-22 DIAGNOSIS — S09.93XS HEAD, FACE & NECK INJURY, SEQUELA: ICD-10-CM

## 2020-12-22 DIAGNOSIS — S19.9XXS HEAD, FACE & NECK INJURY, SEQUELA: ICD-10-CM

## 2020-12-22 DIAGNOSIS — R51.9 GENERALIZED HEADACHES: Primary | ICD-10-CM

## 2020-12-22 DIAGNOSIS — R53.1 LEFT-SIDED WEAKNESS: ICD-10-CM

## 2020-12-22 DIAGNOSIS — M54.2 NECK PAIN: ICD-10-CM

## 2020-12-22 DIAGNOSIS — S09.90XS HEAD, FACE & NECK INJURY, SEQUELA: ICD-10-CM

## 2020-12-22 DIAGNOSIS — H91.8X2 OTHER SPECIFIED HEARING LOSS OF LEFT EAR, UNSPECIFIED HEARING STATUS ON CONTRALATERAL SIDE: ICD-10-CM

## 2020-12-22 PROBLEM — S19.9XXA HEAD, FACE & NECK INJURY: Status: ACTIVE | Noted: 2020-12-22

## 2020-12-22 PROBLEM — H91.92 HEARING LOSS OF LEFT EAR: Status: ACTIVE | Noted: 2020-12-22

## 2020-12-22 PROBLEM — S09.90XA HEAD, FACE & NECK INJURY: Status: ACTIVE | Noted: 2020-12-22

## 2020-12-22 PROBLEM — S09.93XA HEAD, FACE & NECK INJURY: Status: ACTIVE | Noted: 2020-12-22

## 2020-12-22 PROCEDURE — 99214 OFFICE O/P EST MOD 30 MIN: CPT | Performed by: PSYCHIATRY & NEUROLOGY

## 2020-12-22 RX ORDER — OXYBUTYNIN CHLORIDE 15 MG/1
15 TABLET, EXTENDED RELEASE ORAL DAILY
COMMUNITY
Start: 2020-09-28

## 2020-12-22 RX ORDER — METHOCARBAMOL 750 MG/1
750 TABLET, FILM COATED ORAL 3 TIMES DAILY PRN
COMMUNITY
Start: 2020-10-07 | End: 2021-12-15

## 2020-12-22 RX ORDER — AMITRIPTYLINE HYDROCHLORIDE 10 MG/1
10 TABLET, FILM COATED ORAL
Qty: 30 TABLET | Refills: 3 | Status: SHIPPED | OUTPATIENT
Start: 2020-12-22 | End: 2021-03-03 | Stop reason: DRUGHIGH

## 2021-01-17 ENCOUNTER — HOSPITAL ENCOUNTER (OUTPATIENT)
Dept: RADIOLOGY | Facility: HOSPITAL | Age: 26
Discharge: HOME | End: 2021-01-17
Attending: PSYCHIATRY & NEUROLOGY
Payer: COMMERCIAL

## 2021-01-17 DIAGNOSIS — S19.9XXS HEAD, FACE & NECK INJURY, SEQUELA: ICD-10-CM

## 2021-01-17 DIAGNOSIS — H91.8X2 OTHER SPECIFIED HEARING LOSS OF LEFT EAR, UNSPECIFIED HEARING STATUS ON CONTRALATERAL SIDE: ICD-10-CM

## 2021-01-17 DIAGNOSIS — S09.90XS HEAD, FACE & NECK INJURY, SEQUELA: ICD-10-CM

## 2021-01-17 DIAGNOSIS — R51.9 GENERALIZED HEADACHES: ICD-10-CM

## 2021-01-17 DIAGNOSIS — S09.93XS HEAD, FACE & NECK INJURY, SEQUELA: ICD-10-CM

## 2021-01-17 PROCEDURE — A9585 GADOBUTROL INJECTION: HCPCS | Performed by: PSYCHIATRY & NEUROLOGY

## 2021-01-17 PROCEDURE — 70553 MRI BRAIN STEM W/O & W/DYE: CPT

## 2021-01-17 PROCEDURE — G1004 CDSM NDSC: HCPCS

## 2021-01-17 RX ADMIN — GADOBUTROL 8 ML: 604.72 INJECTION INTRAVENOUS at 12:43

## 2021-02-05 ENCOUNTER — TELEMEDICINE (OUTPATIENT)
Dept: FAMILY MEDICINE CLINIC | Facility: CLINIC | Age: 26
End: 2021-02-05

## 2021-02-05 DIAGNOSIS — M54.2 NECK PAIN: Primary | ICD-10-CM

## 2021-02-05 PROCEDURE — 99442 PR PHYS/QHP TELEPHONE EVALUATION 11-20 MIN: CPT | Performed by: FAMILY MEDICINE
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
## 2021-02-05 NOTE — Clinical Note
Please schedule patient for an office visit in 2 weeks with available provider in regards to left arm weakness  Thank you for your help
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
## 2021-02-11 DIAGNOSIS — K03.6 ACCRETIONS ON TEETH: ICD-10-CM

## 2021-02-11 DIAGNOSIS — Z01.20 ENCOUNTER FOR DENTAL EXAMINATION: ICD-10-CM

## 2021-02-11 DIAGNOSIS — K03.6 DENTAL CALCULUS: ICD-10-CM

## 2021-02-11 DIAGNOSIS — K02.9 DENTAL CARIES: ICD-10-CM

## 2021-02-15 ENCOUNTER — OFFICE VISIT (OUTPATIENT)
Dept: FAMILY MEDICINE CLINIC | Facility: CLINIC | Age: 26
End: 2021-02-15

## 2021-02-15 DIAGNOSIS — Z20.822 SUSPECTED COVID-19 VIRUS INFECTION: Primary | ICD-10-CM

## 2021-02-15 PROCEDURE — 99213 OFFICE O/P EST LOW 20 MIN: CPT | Performed by: FAMILY MEDICINE

## 2021-02-15 NOTE — ASSESSMENT & PLAN NOTE
-Has been having Flu-like symptoms since 2/12/2021  -No exposure to anyone positive for COVID  -Continue symptomatic treatment at home (Increase Hydration, tylenol,& cough suppressant)  -Will test for COVID/Flu/RSV test  -Quarantine for 10 days  -ER precautions provided

## 2021-02-15 NOTE — PROGRESS NOTES
COVID-19 Virtual Visit     Assessment/Plan:    Problem List Items Addressed This Visit        Other    Suspected COVID-19 virus infection - Primary     -Has been having Flu-like symptoms since 2/12/2021  -No exposure to anyone positive for COVID  -Continue symptomatic treatment at home (Increase Hydration, tylenol,& cough suppressant)  -Will test for COVID/Flu/RSV test  -Quarantine for 10 days  -ER precautions provided         Relevant Orders    COVID19, Influenza A/B, RSV PCR, [facility]         Disposition:     I recommended self-quarantine for 10 days and to watch for symptoms until 14 days after exposure  If patient were to develop symptoms, they should self isolate and call our office for further guidance  I referred patient to one of our centralized sites for a COVID-19 swab  I have spent 20 minutes directly with the patient  Greater than 50% of this time was spent in counseling/coordination of care regarding: instructions for management and patient and family education  Encounter provider Luis Alberto Puri MD    Provider located at [address] [place] [id number] [phone]    Recent Visits  No visits were found meeting these conditions  Showing recent visits within past 7 days and meeting all other requirements     Today's Visits  Date Type Provider Dept   02/15/21 Office Visit Luis Alberto Puri MD [facility]   Showing today's visits and meeting all other requirements     Future Appointments  No visits were found meeting these conditions  Showing future appointments within next 150 days and meeting all other requirements      This virtual check-in was done via [employer] and patient was informed that this is a secure, HIPAA-compliant platform  She agrees to proceed  Patient agrees to participate in a virtual check in via telephone or video visit instead of presenting to the office to address urgent/immediate medical needs  Patient is aware this is a billable service  After connecting through [facility], the patient was identified by name and date of birth  Wally Otero was informed that this was a telemedicine visit and that the exam was being conducted confidentially over secure lines  My office door was closed  No one else was in the room  Wally Otero acknowledged consent and understanding of privacy and security of the telemedicine visit  I informed the patient that I have reviewed her record in Epic and presented the opportunity for her to ask any questions regarding the visit today  The patient agreed to participate  Subjective:   Wally Otero is a 30 y o  female who is concerned about COVID-19  Patient's symptoms include chills, fatigue, nasal congestion, sore throat, cough, myalgias and headache  Patient denies anosmia, loss of taste, shortness of breath, chest tightness, abdominal pain, nausea and vomiting  Date of symptom onset: 2/12/2021    Exposure:   Contact with a person who is under investigation (PUI) for or who is positive for COVID-19 within the last 14 days?: No    Hospitalized recently for fever and/or lower respiratory symptoms?: No      Currently a healthcare worker that is involved in direct patient care?: No      Works in a special setting where the risk of COVID-19 transmission may be high? (this may include long-term care, correctional and alf facilities; homeless shelters; assisted-living facilities and group homes ): No      Resident in a special setting where the risk of COVID-19 transmission may be high? (this may include long-term care, correctional and alf facilities; homeless shelters; assisted-living facilities and group homes ): No              No results found for: SARSCOV2, [facility], [address] [place] [address],[address], [place] [address]  Past Medical History:   Diagnosis Date    Anemia     Asthma     Heart murmur      History reviewed  No pertinent surgical history    Current Outpatient Medications   Medication Sig Dispense Refill    albuterol (Ventolin HFA) 90 mcg/act inhaler Inhale 2 puffs every 4 (four) hours as needed for wheezing 2 Inhaler 3    amitriptyline (ELAVIL) 10 mg tablet Take 1 tablet (10 mg total) by mouth daily at bedtime 30 tablet 3    desogestrel-ethinyl estradiol (APRI) 0 15-30 MG-MCG per tablet Take 1 tablet by mouth daily      diclofenac sodium (VOLTAREN) 1 % Apply 2 g topically 4 (four) times a day 100 g 0    fluticasone (FLONASE) 50 mcg/act nasal spray 1 spray into each nostril daily 16 g 2    ibuprofen (MOTRIN) 800 mg tablet Take 1 tablet (800 mg total) by mouth every 8 (eight) hours as needed for moderate pain 21 tablet 0    loratadine (CLARITIN) 10 mg tablet Take 1 tablet (10 mg total) by mouth daily 30 tablet 1    methocarbamol (ROBAXIN) 750 mg tablet Take 750 mg by mouth 3 (three) times a day as needed      montelukast (SINGULAIR) 10 mg tablet Take 1 tablet (10 mg total) by mouth daily at bedtime 90 tablet 3    oxybutynin (DITROPAN XL) 15 MG 24 hr tablet Take 15 mg by mouth daily      pentosan polysulfate (ELMIRON) 100 mg capsule 3 (three) times a day       No current facility-administered medications for this visit  Allergies   Allergen Reactions    Eggs Or Egg-Derived Products Anaphylaxis    Peanut Oil Anaphylaxis    Baking Soda-Fluoride [Sodium Fluoride] Swelling    Benzocaine Swelling    Aloe Vera Hives, Itching and Rash       Review of Systems   Constitutional: Positive for chills and fatigue  HENT: Positive for congestion and sore throat  Respiratory: Positive for cough  Negative for chest tightness and shortness of breath  Gastrointestinal: Negative for abdominal pain, nausea and vomiting  Musculoskeletal: Positive for myalgias  Neurological: Positive for headaches  Objective: There were no vitals filed for this visit      Physical Exam   NAD  Speaking full sentences  VIRTUAL VISIT 3 [facility] acknowledges that she has consented to an online visit or consultation  She understands that the online visit is based solely on information provided by her, and that, in the absence of a face-to-face physical evaluation by the physician, the diagnosis she receives is both limited and provisional in terms of accuracy and completeness  This is not intended to replace a full medical face-to-face evaluation by the physician  Heriberto Conroy understands and accepts these terms

## 2021-02-17 ENCOUNTER — DOCUMENTATION (OUTPATIENT)
Dept: URGENT CARE | Facility: MEDICAL CENTER | Age: 26
End: 2021-02-17

## 2021-02-17 DIAGNOSIS — Z20.822 SUSPECTED COVID-19 VIRUS INFECTION: ICD-10-CM

## 2021-02-17 DIAGNOSIS — Z20.822 SUSPECTED COVID-19 VIRUS INFECTION: Primary | ICD-10-CM

## 2021-02-17 PROCEDURE — 0241U HB NFCT DS VIR RESP RNA 4 TRGT: CPT

## 2021-02-17 NOTE — PROGRESS NOTES
test ordered for patient was not able to scan on packing list advised pt to call doctor back and have them put in the correct novel lab order

## 2021-02-18 ENCOUNTER — TELEPHONE (OUTPATIENT)
Dept: FAMILY MEDICINE CLINIC | Facility: CLINIC | Age: 26
End: 2021-02-18

## 2021-02-18 DIAGNOSIS — R09.82 POSTNASAL DRIP: Primary | ICD-10-CM

## 2021-02-18 LAB
FLUAV RNA RESP QL NAA+PROBE: NEGATIVE
FLUBV RNA RESP QL NAA+PROBE: NEGATIVE
RSV RNA RESP QL NAA+PROBE: NEGATIVE
SARS-COV-2 RNA RESP QL NAA+PROBE: POSITIVE

## 2021-02-18 RX ORDER — OXYMETAZOLINE HYDROCHLORIDE 0.05 G/100ML
3 SPRAY NASAL 2 TIMES DAILY
Qty: 1.8 ML | Refills: 0 | Status: SHIPPED | OUTPATIENT
Start: 2021-02-18 | End: 2021-12-15

## 2021-02-18 NOTE — TELEPHONE ENCOUNTER
Spoke with patient  Informed her of positive COVID result and the need to continue quarantine for 10 days  She states symptoms are improving  She does have a lot of Nasal congestion  Will give 3 days of nasal decongestant spray for relief  ER precautions provided   Patient encouraged to have house hold members and close contacts get tested

## 2021-02-25 DIAGNOSIS — R53.1 LEFT-SIDED WEAKNESS: Primary | ICD-10-CM

## 2021-02-25 DIAGNOSIS — H91.8X2 OTHER SPECIFIED HEARING LOSS OF LEFT EAR, UNSPECIFIED HEARING STATUS ON CONTRALATERAL SIDE: ICD-10-CM

## 2021-02-25 DIAGNOSIS — R51.9 GENERALIZED HEADACHES: ICD-10-CM

## 2021-03-03 ENCOUNTER — TELEMEDICINE (OUTPATIENT)
Dept: NEUROLOGY | Facility: CLINIC | Age: 26
End: 2021-03-03
Payer: COMMERCIAL

## 2021-03-03 ENCOUNTER — TELEPHONE (OUTPATIENT)
Dept: NEUROLOGY | Facility: CLINIC | Age: 26
End: 2021-03-03

## 2021-03-03 VITALS — HEIGHT: 67 IN | WEIGHT: 185 LBS | BODY MASS INDEX: 29.03 KG/M2

## 2021-03-03 DIAGNOSIS — R53.1 LEFT-SIDED WEAKNESS: ICD-10-CM

## 2021-03-03 DIAGNOSIS — G47.09 OTHER INSOMNIA: ICD-10-CM

## 2021-03-03 DIAGNOSIS — R51.9 GENERALIZED HEADACHES: Primary | ICD-10-CM

## 2021-03-03 PROCEDURE — 1036F TOBACCO NON-USER: CPT | Performed by: PHYSICIAN ASSISTANT

## 2021-03-03 PROCEDURE — 99214 OFFICE O/P EST MOD 30 MIN: CPT | Performed by: PHYSICIAN ASSISTANT

## 2021-03-03 RX ORDER — AMITRIPTYLINE HYDROCHLORIDE 25 MG/1
25 TABLET, FILM COATED ORAL
Qty: 30 TABLET | Refills: 5 | Status: SHIPPED | OUTPATIENT
Start: 2021-03-03 | End: 2021-07-08 | Stop reason: SDUPTHER

## 2021-03-03 NOTE — TELEPHONE ENCOUNTER
I called and left a message for the patient to return my call so I can review her vital signs, medications, history and her ROS for her video visit for Laura Van today   I left my name and number for her to return my call
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
## 2021-03-03 NOTE — PATIENT INSTRUCTIONS
Increase amitriptyline to 25mg at bedtime  Can further discuss sleep issues with PCP  Continue seeing chiropractor, pain management   Would continue with PT  Follow up in 3-4 months

## 2021-03-05 ENCOUNTER — OFFICE VISIT (OUTPATIENT)
Dept: FAMILY MEDICINE CLINIC | Facility: CLINIC | Age: 26
End: 2021-03-05

## 2021-03-05 VITALS
SYSTOLIC BLOOD PRESSURE: 106 MMHG | HEART RATE: 79 BPM | RESPIRATION RATE: 18 BRPM | TEMPERATURE: 96.4 F | BODY MASS INDEX: 30.76 KG/M2 | HEIGHT: 66 IN | OXYGEN SATURATION: 98 % | DIASTOLIC BLOOD PRESSURE: 68 MMHG | WEIGHT: 191.4 LBS

## 2021-03-05 DIAGNOSIS — R29.898 WEAKNESS OF LEFT UPPER EXTREMITY: ICD-10-CM

## 2021-03-05 DIAGNOSIS — J45.20 MILD INTERMITTENT ASTHMA WITHOUT COMPLICATION: ICD-10-CM

## 2021-03-05 DIAGNOSIS — Z23 ENCOUNTER FOR IMMUNIZATION: Primary | ICD-10-CM

## 2021-03-05 PROCEDURE — 3008F BODY MASS INDEX DOCD: CPT | Performed by: PHYSICIAN ASSISTANT

## 2021-03-05 PROCEDURE — 99213 OFFICE O/P EST LOW 20 MIN: CPT | Performed by: FAMILY MEDICINE

## 2021-03-05 RX ORDER — LORATADINE 10 MG/1
10 TABLET ORAL DAILY
Qty: 30 TABLET | Refills: 1 | Status: SHIPPED | OUTPATIENT
Start: 2021-03-05 | End: 2021-12-15 | Stop reason: SDUPTHER
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
## 2021-03-10 DIAGNOSIS — Z23 ENCOUNTER FOR IMMUNIZATION: ICD-10-CM

## 2021-05-05 ENCOUNTER — OFFICE VISIT (OUTPATIENT)
Dept: FAMILY MEDICINE CLINIC | Facility: CLINIC | Age: 26
End: 2021-05-05

## 2021-05-05 VITALS
BODY MASS INDEX: 32.22 KG/M2 | HEART RATE: 88 BPM | TEMPERATURE: 97.8 F | RESPIRATION RATE: 18 BRPM | DIASTOLIC BLOOD PRESSURE: 68 MMHG | WEIGHT: 199.6 LBS | SYSTOLIC BLOOD PRESSURE: 120 MMHG

## 2021-05-05 DIAGNOSIS — H91.92 LEFT EAR HEARING LOSS: ICD-10-CM

## 2021-05-05 DIAGNOSIS — R29.898 WEAKNESS OF LEFT UPPER EXTREMITY: Primary | ICD-10-CM

## 2021-05-05 PROCEDURE — 99213 OFFICE O/P EST LOW 20 MIN: CPT | Performed by: FAMILY MEDICINE
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
## 2021-06-10 ENCOUNTER — OFFICE VISIT (OUTPATIENT)
Dept: DENTISTRY | Facility: CLINIC | Age: 26
End: 2021-06-10

## 2021-06-10 VITALS — DIASTOLIC BLOOD PRESSURE: 75 MMHG | HEART RATE: 91 BPM | TEMPERATURE: 97.1 F | SYSTOLIC BLOOD PRESSURE: 122 MMHG

## 2021-06-10 DIAGNOSIS — K02.9 DENTAL CARIES: Primary | ICD-10-CM

## 2021-06-10 PROCEDURE — D2392 RESIN-BASED COMPOSITE - 2 SURFACES, POSTERIOR: HCPCS | Performed by: DENTIST

## 2021-06-10 NOTE — PROGRESS NOTES
Composite Filling    Conley Duane presents for composite filling  PMH reviewed, no changes  Pt still has jaw muscle pain after waking up in the morning, but symptom is less compared to before her retainers were delivered  Instructed pt to continue wearing the retainers  Applied topical benzocaine for 10 sec, wiped benzocaine off using gauze, rinsed area for 20 sec  After application of benzocaine topical, pt reports past episode of allergic reaction after dental treatment that caused swelling of cheek/lip tissue (no problems with breathing) that was potentially attributed by benzocaine allergic reaction  Pt has never been to allergist before but would like to make an appointment  Instructed pt to monitor for signs of allergic reaction, and visit ED immediately if that happens  Pt verbalized understanding and claims that she lives very close to the hospital      Administered 1 carp 2% lido 1:100k epi via IA , LB  Prepped tooth #19-OB and removed caries with 245 carbide on high speed  Etch with 37% H2PO4, rinse, dry  Applied Adhese with 20 second scrub once, gentle air dry and light cured for 10s  Restored with Tetric bulk minerva shade A2 and light cured  Refined with finishing burs, polished with enhance point  Verified occlusion and contacts  Pt left satisfied  Tissue appears normal in color, size, shape 30 mins after application of benzocaine topical anesthetics  Informed pt to continue to monitor right cheek for swelling or redness, and visit ED immediately if she notices those signs  Pt verbalized understanding       NV: periodic exam, Jennifer Beal  In house faculty: Dr Conley Duane

## 2021-06-17 ENCOUNTER — IMMUNIZATIONS (OUTPATIENT)
Dept: FAMILY MEDICINE CLINIC | Facility: HOSPITAL | Age: 26
End: 2021-06-17

## 2021-06-17 DIAGNOSIS — Z23 ENCOUNTER FOR IMMUNIZATION: Primary | ICD-10-CM

## 2021-06-17 PROCEDURE — 0001A SARS-COV-2 / COVID-19 MRNA VACCINE (PFIZER-BIONTECH) 30 MCG: CPT

## 2021-06-17 PROCEDURE — 91300 SARS-COV-2 / COVID-19 MRNA VACCINE (PFIZER-BIONTECH) 30 MCG: CPT

## 2021-07-01 ENCOUNTER — OFFICE VISIT (OUTPATIENT)
Dept: OTOLARYNGOLOGY | Facility: CLINIC | Age: 26
End: 2021-07-01
Payer: COMMERCIAL

## 2021-07-01 ENCOUNTER — OFFICE VISIT (OUTPATIENT)
Dept: AUDIOLOGY | Facility: CLINIC | Age: 26
End: 2021-07-01
Payer: COMMERCIAL

## 2021-07-01 VITALS
SYSTOLIC BLOOD PRESSURE: 115 MMHG | BODY MASS INDEX: 32.47 KG/M2 | HEIGHT: 66 IN | RESPIRATION RATE: 16 BRPM | TEMPERATURE: 97 F | WEIGHT: 202 LBS | DIASTOLIC BLOOD PRESSURE: 79 MMHG | HEART RATE: 75 BPM

## 2021-07-01 DIAGNOSIS — H90.42 SENSORINEURAL HEARING LOSS (SNHL) OF LEFT EAR WITH UNRESTRICTED HEARING OF RIGHT EAR: Primary | ICD-10-CM

## 2021-07-01 DIAGNOSIS — H90.3 ASYMMETRIC SNHL (SENSORINEURAL HEARING LOSS): Primary | ICD-10-CM

## 2021-07-01 DIAGNOSIS — H90.42 SENSORINEURAL HEARING LOSS (SNHL) OF LEFT EAR WITH UNRESTRICTED HEARING OF RIGHT EAR: ICD-10-CM

## 2021-07-01 PROCEDURE — 3008F BODY MASS INDEX DOCD: CPT | Performed by: PSYCHIATRY & NEUROLOGY

## 2021-07-01 PROCEDURE — 99203 OFFICE O/P NEW LOW 30 MIN: CPT | Performed by: OTOLARYNGOLOGY

## 2021-07-01 PROCEDURE — 92557 COMPREHENSIVE HEARING TEST: CPT | Performed by: AUDIOLOGIST

## 2021-07-01 PROCEDURE — 92567 TYMPANOMETRY: CPT | Performed by: AUDIOLOGIST
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
## 2021-07-08 ENCOUNTER — OFFICE VISIT (OUTPATIENT)
Dept: NEUROLOGY | Facility: CLINIC | Age: 26
End: 2021-07-08
Payer: COMMERCIAL

## 2021-07-08 VITALS
DIASTOLIC BLOOD PRESSURE: 55 MMHG | HEART RATE: 81 BPM | SYSTOLIC BLOOD PRESSURE: 104 MMHG | BODY MASS INDEX: 32.51 KG/M2 | WEIGHT: 201.4 LBS

## 2021-07-08 DIAGNOSIS — R51.9 GENERALIZED HEADACHES: Primary | ICD-10-CM

## 2021-07-08 DIAGNOSIS — R29.898 WEAKNESS OF LEFT UPPER EXTREMITY: ICD-10-CM

## 2021-07-08 DIAGNOSIS — H91.8X2 OTHER SPECIFIED HEARING LOSS OF LEFT EAR, UNSPECIFIED HEARING STATUS ON CONTRALATERAL SIDE: ICD-10-CM

## 2021-07-08 DIAGNOSIS — M26.609 TMJ (TEMPOROMANDIBULAR JOINT SYNDROME): ICD-10-CM

## 2021-07-08 DIAGNOSIS — R45.86 MOOD AND AFFECT DISTURBANCE: ICD-10-CM

## 2021-07-08 PROCEDURE — 99215 OFFICE O/P EST HI 40 MIN: CPT | Performed by: PSYCHIATRY & NEUROLOGY

## 2021-07-08 PROCEDURE — 1036F TOBACCO NON-USER: CPT | Performed by: PSYCHIATRY & NEUROLOGY

## 2021-07-08 RX ORDER — PROCHLORPERAZINE MALEATE 10 MG
10 TABLET ORAL EVERY 8 HOURS PRN
Qty: 14 TABLET | Refills: 0 | Status: SHIPPED | OUTPATIENT
Start: 2021-07-08 | End: 2022-07-08

## 2021-07-08 RX ORDER — DEXAMETHASONE 2 MG/1
2 TABLET ORAL
Qty: 5 TABLET | Refills: 1 | Status: SHIPPED | OUTPATIENT
Start: 2021-07-08 | End: 2021-12-15

## 2021-07-08 RX ORDER — AMITRIPTYLINE HYDROCHLORIDE 25 MG/1
50 TABLET, FILM COATED ORAL
Qty: 60 TABLET | Refills: 5 | Status: SHIPPED | OUTPATIENT
Start: 2021-07-08
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
## 2021-07-09 ENCOUNTER — TELEPHONE (OUTPATIENT)
Dept: NEUROLOGY | Facility: CLINIC | Age: 26
End: 2021-07-09

## 2021-07-09 DIAGNOSIS — R51.9 GENERALIZED HEADACHES: Primary | ICD-10-CM

## 2021-07-09 NOTE — TELEPHONE ENCOUNTER
----- Message from Jose Trent MD sent at 7/9/2021  1:26 PM EDT -----  Regarding: RE: Labs  Orders are in place   ----- Message -----  From: Demarcus Choi, MATTHEW  Sent: 7/9/2021  10:08 AM EDT  To: Jose Trent MD  Subject: Labs                                             Good morning! Please place lab orders  Thank you!  ----- Message -----  From: Gordon Rdz  Sent: 7/9/2021   8:53 AM EDT  To: Yumiko Orosco, MATTHEW, Collette Brenner RN      ----- Message -----  From: Jason Avila  Sent: 7/8/2021  12:13 PM EDT  To: Danilo Jewell,    I scheduled a CTA for this patient  Central scheduling said that she will need a recent BUN and Creatinine       Thank you so much,  Jason Avila

## 2021-07-09 NOTE — TELEPHONE ENCOUNTER
Called patient, but there was no answer  Per medical communication consent form, it is okay to leave messages on voicemail for [phone]  I did leave a message (without any patient identifiers) reminding her to have labs done within 90 days of CTA  I left our phone number for call back with any questions or concerns

## 2021-07-12 ENCOUNTER — OFFICE VISIT (OUTPATIENT)
Dept: DENTISTRY | Facility: CLINIC | Age: 26
End: 2021-07-12

## 2021-07-12 VITALS — DIASTOLIC BLOOD PRESSURE: 77 MMHG | HEART RATE: 90 BPM | TEMPERATURE: 96.4 F | SYSTOLIC BLOOD PRESSURE: 146 MMHG

## 2021-07-12 VITALS — TEMPERATURE: 96.4 F | SYSTOLIC BLOOD PRESSURE: 117 MMHG | HEART RATE: 83 BPM | DIASTOLIC BLOOD PRESSURE: 77 MMHG

## 2021-07-12 DIAGNOSIS — K02.9 DENTAL CARIES: Primary | ICD-10-CM

## 2021-07-12 DIAGNOSIS — R52 PAIN: Primary | ICD-10-CM

## 2021-07-12 DIAGNOSIS — R68.84 PAIN IN MANDIBLE: ICD-10-CM

## 2021-07-12 PROCEDURE — D0220 INTRAORAL - PERIAPICAL FIRST RADIOGRAPHIC IMAGE: HCPCS | Performed by: DENTIST

## 2021-07-12 PROCEDURE — D0140 LIMITED ORAL EVALUATION - PROBLEM FOCUSED: HCPCS | Performed by: DENTIST

## 2021-07-12 NOTE — PROGRESS NOTES
IRM Filling    Linda Ramos presents for replacement of composite restoration on #30-OB for IRM  PMH reviewed, no changes  Administered 1 carp 2% lido 1:100k epi via SARAHI and 1 carp 4% articaine 1:100k epi via buccal infiltration  Prepped tooth #30-OB with 330 carbide on high speed  Previous restoration removed and IRM placed as temporary restoration  Verified no occlusion on IRM restoration and adaptation of IRM to tooth structure        NV: f/u #30 in 6-8 weeks for symptoms (RCT if symptoms persist)  WW: Dr Linda Ramos
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
## 2021-07-14 ENCOUNTER — IMMUNIZATIONS (OUTPATIENT)
Dept: FAMILY MEDICINE CLINIC | Facility: HOSPITAL | Age: 26
End: 2021-07-14

## 2021-07-14 DIAGNOSIS — Z23 ENCOUNTER FOR IMMUNIZATION: Primary | ICD-10-CM

## 2021-07-14 PROCEDURE — 0002A SARS-COV-2 / COVID-19 MRNA VACCINE (PFIZER-BIONTECH) 30 MCG: CPT

## 2021-07-14 PROCEDURE — 91300 SARS-COV-2 / COVID-19 MRNA VACCINE (PFIZER-BIONTECH) 30 MCG: CPT

## 2021-07-20 ENCOUNTER — OFFICE VISIT (OUTPATIENT)
Dept: AUDIOLOGY | Age: 26
End: 2021-07-20

## 2021-07-20 DIAGNOSIS — H90.3 SENSORY HEARING LOSS, BILATERAL: Primary | ICD-10-CM

## 2021-07-20 NOTE — PROGRESS NOTES
Hearing Aid Evaluation  Name:  Victoriano Dinero  :  1995  Age:  27 y o  Date of Evaluation: 21     Audiologic Results: Audiologic testing was performed on 2021, testing revealed unilateral sensorineural hearing loss  Amplification is recommended in the left ear only    Hearing Aid Evaluation:  Hearing aid styles, technology, and price were discussed with the patient  Possible hearing aid options, programs, and accessories were discussed  Pricing options and technology levels were discussed to determine the appropriate device to recommend  Recommendation/Quote: The first hearing aid recommendation is  Oticon MORE 1 Mini RITE  The second hearing aid recommendation is  Oticon MORE 2 Mini RITE  See attached quote sheet*    Selection:   Patient is waiting on confirmation from  on purchasing the proper hearing aids  The patient selected the Oticon MORE 1 Mini RITE hearing aids      Level: TBD   Color: Navy or 80    size: Left 0 / 100   Earmold Impressions: TBD   Accessories:  and R Donna 21, Au JOSE , CCC-A  Clinical Audiologist

## 2021-07-22 ENCOUNTER — HOSPITAL ENCOUNTER (OUTPATIENT)
Dept: RADIOLOGY | Age: 26
Discharge: HOME | End: 2021-07-22
Payer: COMMERCIAL

## 2021-07-22 ENCOUNTER — TELEPHONE (OUTPATIENT)
Dept: NEUROLOGY | Facility: CLINIC | Age: 26
End: 2021-07-22

## 2021-07-22 DIAGNOSIS — H91.8X2 OTHER SPECIFIED HEARING LOSS OF LEFT EAR, UNSPECIFIED HEARING STATUS ON CONTRALATERAL SIDE: ICD-10-CM

## 2021-07-22 PROCEDURE — G1004 CDSM NDSC: HCPCS

## 2021-07-22 PROCEDURE — 70480 CT ORBIT/EAR/FOSSA W/O DYE: CPT

## 2021-07-22 NOTE — TELEPHONE ENCOUNTER
Received call from Marissa with [facility]  Pt is currently there to have her CT completed  They are unable to establish IV access after multiple attempts  They are asking if they can proceed with CT without contrast    [phone]    TT sent to Dr Rose barnhart in [facility] and reached [facility]  She gave phone to Dr Bryan Preston  Discussed above  Okay to proceed without contrast      Marissa made aware  Nothing further needed at this time

## 2021-08-17 ENCOUNTER — CLINICAL SUPPORT (OUTPATIENT)
Dept: DENTISTRY | Facility: CLINIC | Age: 26
End: 2021-08-17

## 2021-08-17 VITALS — TEMPERATURE: 97.5 F | SYSTOLIC BLOOD PRESSURE: 114 MMHG | HEART RATE: 76 BPM | DIASTOLIC BLOOD PRESSURE: 77 MMHG

## 2021-08-17 DIAGNOSIS — Z01.20 ENCOUNTER FOR DENTAL EXAMINATION: Primary | ICD-10-CM

## 2021-08-17 DIAGNOSIS — K03.6 ACCRETIONS ON TEETH: ICD-10-CM

## 2021-08-17 DIAGNOSIS — K02.9 DENTAL CARIES: ICD-10-CM

## 2021-08-17 DIAGNOSIS — K03.6 DENTAL CALCULUS: ICD-10-CM

## 2021-08-17 PROCEDURE — D1110 PROPHYLAXIS - ADULT: HCPCS

## 2021-08-17 PROCEDURE — D0120 PERIODIC ORAL EVALUATION - ESTABLISHED PATIENT: HCPCS

## 2021-08-17 NOTE — PROGRESS NOTES
Prophkillian    Dental procedures in this visit     - PROPHYLAXIS - ADULT (Completed)     Service provider: Sophia Jackson [facility], [address] [place] [address]     Billing provider: Cece Johnson DDS     - PERIODIC ORAL EVALUATION - ESTABLISHED PATIENT (Completed)     Service provider: Gumaro Lee DDS     Billing provider: Cece Johnson DDS       Method Used:  · Prophy Method Used: Hand Scaling  · Polished  · Flossed    Radiographs Taken:  · None    Intra/Extra Oral Cancer Screening:  · Within normal limits    Orthodontic Screening:  · not performed    Oral Hygiene:  · Fair    Plaque:  · Generalized  · Light    Calculus:  · Generalized  · Light  · gen  light sub  under margin of gingiva    Bleeding:  · Bleeding on probing: No periodontal exam for this visit  · Generalized  · Light    Stain:  · None    Periodontal Charting:  · Spot probing    Periodontal Classification:  · Mild    Periodontitis Staging/Grading:  · Stagin  · Grading:  A    Discussed SONICARE brush  Reviewed cervical brushing  Hyper sensitivity noted  Explained gen  Light sub  Calc stressed flossing    Still has some sens  # 30  Dr Maida Roberts will see her next week for perm  Restoration   Explained tooth may need endo if it does not calm down    NV # 30 per  Filling  NV2  6 mos recall BWX    No orders of the defined types were placed in this encounter

## 2021-08-24 ENCOUNTER — OFFICE VISIT (OUTPATIENT)
Dept: DENTISTRY | Facility: CLINIC | Age: 26
End: 2021-08-24

## 2021-08-24 VITALS — SYSTOLIC BLOOD PRESSURE: 120 MMHG | HEART RATE: 76 BPM | TEMPERATURE: 96.4 F | DIASTOLIC BLOOD PRESSURE: 69 MMHG

## 2021-08-24 DIAGNOSIS — K02.62 CARIES OF DENTIN: Primary | ICD-10-CM

## 2021-08-24 PROCEDURE — D2392 RESIN-BASED COMPOSITE - 2 SURFACES, POSTERIOR: HCPCS | Performed by: DENTIST

## 2021-08-24 NOTE — PROGRESS NOTES
Dental procedures in this visit     - RESIN-BASED COMPOSITE - 2 SURFACES, POSTERIOR 30 YOSVANY (Completed)   Composite Filling    Delroy Pain presents for composite filling  PMH reviewed, no changes  Pt is allergic to benzocaine  Benzocaine was NOT administered  Discussed with patient need for RCT if pulp exposure occurs or in future if pulp is inflamed  Pt understands and consents  Pt is currently not having any sensitivity on #30 and is okay to proceed with a permanent filling on tooth  Administered 1 carps 4% articaine 1:100k epi via infiltration  Prepped tooth #30 with 245 carbide on high speed to remove previous IRM  Slow speed used as well to remove IRM  Limelight previously placed on tooth  Placed tofflemire/palodent matrix  Isolation with cotton rolls and dri-angles    Etch with 37% H2PO4, rinse, dry  Applied Gluma according to 's directions  Air dried and restored bottom half of restoration with Shofu Fuji II  Applied adhesive with 20 second scrub once, gentle air dry and light cured for 10s  Restored with Tetric bulk minerva shade A2 and light cured  Small cut present on FOM  Achieved hemostasis prior to sending patient home  Refined with finishing burs, polished with enhance point  Verified occlusion and contacts  Pt left satisfied      NV: 6 Month Prophy

## 2021-09-02 ENCOUNTER — EVALUATION (OUTPATIENT)
Dept: PHYSICAL THERAPY | Facility: CLINIC | Age: 26
End: 2021-09-02
Payer: COMMERCIAL

## 2021-09-02 DIAGNOSIS — M46.1 SACROILIITIS (HCC): Primary | ICD-10-CM

## 2021-09-02 PROCEDURE — 97110 THERAPEUTIC EXERCISES: CPT | Performed by: PHYSICAL THERAPIST

## 2021-09-02 PROCEDURE — 97162 PT EVAL MOD COMPLEX 30 MIN: CPT | Performed by: PHYSICAL THERAPIST
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
## 2021-09-07 ENCOUNTER — OFFICE VISIT (OUTPATIENT)
Dept: PHYSICAL THERAPY | Facility: CLINIC | Age: 26
End: 2021-09-07
Payer: COMMERCIAL

## 2021-09-07 DIAGNOSIS — M46.1 SACROILIITIS (HCC): Primary | ICD-10-CM

## 2021-09-07 PROCEDURE — 97110 THERAPEUTIC EXERCISES: CPT

## 2021-09-07 PROCEDURE — 97112 NEUROMUSCULAR REEDUCATION: CPT

## 2021-09-07 NOTE — PROGRESS NOTES
Daily Note     Today's date: 2021  Patient name: Delroy Sanders  : 1995  MRN: [id number]  Referring provider: Brianna Lopez MD  Dx:   Encounter Diagnosis     ICD-10-CM    1  Sacroiliitis (HCC)  M46 1                   Subjective: Pt reports compliance with HEP but requires some cues for correct technique  Pt reports slight stiffness in low back  She does reports pain in R shoulder and neck with TE and activity  Pt denies increased low back pain post PT session  Objective: See treatment diary below      Assessment:  Pt demonstrates good tolerance to TE reporting only difficulty is when she has pain with L shoulder and neck  Patient demonstrated fatigue post treatment, exhibited good technique with therapeutic exercises and would benefit from continued PT to increase flexibility, strength and function  Plan: Continue per plan of care        Precautions: none    Daily Treatment Diary    Date       Visit Number 1 2      FOTO IE       Re-Eval IE          Manuals                                    Neuro Re-Ed     Squats         bridge  15x      SLR  15x ea                                      Ther Ex    Standing hip abd/ext   15x ea      squats HEP 15x      Bike   5'      Hamstring stretch   20" x 5      Q/l stretch  HEP 10" x 10      LTR  10" x 5 ea                      Ther Activity                                    Modalities    MHP and TENS

## 2021-09-09 ENCOUNTER — APPOINTMENT (OUTPATIENT)
Dept: PHYSICAL THERAPY | Facility: CLINIC | Age: 26
End: 2021-09-09
Payer: COMMERCIAL

## 2021-09-14 ENCOUNTER — OFFICE VISIT (OUTPATIENT)
Dept: PHYSICAL THERAPY | Facility: CLINIC | Age: 26
End: 2021-09-14
Payer: COMMERCIAL

## 2021-09-14 DIAGNOSIS — M46.1 SACROILIITIS (HCC): Primary | ICD-10-CM

## 2021-09-14 PROCEDURE — 97110 THERAPEUTIC EXERCISES: CPT | Performed by: PHYSICAL THERAPIST

## 2021-09-14 PROCEDURE — 97112 NEUROMUSCULAR REEDUCATION: CPT | Performed by: PHYSICAL THERAPIST

## 2021-09-14 NOTE — PROGRESS NOTES
Daily Note     Today's date: 2021  Patient name: Yudelka Smith  : 1995  MRN: [id number]  Referring provider: Griselda Arteaga MD  Dx:   Encounter Diagnosis     ICD-10-CM    1  Sacroiliitis ([facility] [address] )  M46 1                   Subjective: Pt reports at this time she is having pain all over her body with inability to place too much weight into her LLE  She is currently stating that she has not moved for the last 2 days 2/2 pain  Objective: See treatment diary below      Assessment: Tolerated treatment fair with slow completion of all interventions  She is currently able to complete them but found improvement in TVA with interventions to improve function  She will continue to progress next visit  Plan: Continue per plan of care        Precautions: none    Daily Treatment Diary    Date      Visit Number 1 2 3     FOTO IE       Re-Eval IE          Manuals                                    Neuro Re-Ed             bridge  15x 2x15     SLR  15x ea 2x15     clams   2x15                             Ther Ex    Standing hip abd/ext   15x ea 2x15     squats HEP 15x 2x15     Bike   5' 5'     Hamstring stretch   20" x 5      Q/l stretch  HEP 10" x 10      LTR  10" x 5 ea      LP    20x 65#              Ther Activity                                    Modalities    MHP and TENS

## 2021-09-15 ENCOUNTER — OFFICE VISIT (OUTPATIENT)
Dept: FAMILY MEDICINE CLINIC | Facility: CLINIC | Age: 26
End: 2021-09-15

## 2021-09-15 VITALS
OXYGEN SATURATION: 97 % | BODY MASS INDEX: 32.44 KG/M2 | HEIGHT: 67 IN | DIASTOLIC BLOOD PRESSURE: 74 MMHG | SYSTOLIC BLOOD PRESSURE: 118 MMHG | TEMPERATURE: 97.3 F | RESPIRATION RATE: 16 BRPM | HEART RATE: 96 BPM | WEIGHT: 206.7 LBS

## 2021-09-15 DIAGNOSIS — N92.6 MISSED PERIOD: ICD-10-CM

## 2021-09-15 DIAGNOSIS — E66.09 CLASS 1 OBESITY DUE TO EXCESS CALORIES WITHOUT SERIOUS COMORBIDITY WITH BODY MASS INDEX (BMI) OF 32.0 TO 32.9 IN ADULT: ICD-10-CM

## 2021-09-15 DIAGNOSIS — Z11.4 SCREENING FOR HIV (HUMAN IMMUNODEFICIENCY VIRUS): ICD-10-CM

## 2021-09-15 DIAGNOSIS — J45.30 MILD PERSISTENT ASTHMA WITHOUT COMPLICATION: ICD-10-CM

## 2021-09-15 DIAGNOSIS — Z11.59 NEED FOR HEPATITIS C SCREENING TEST: ICD-10-CM

## 2021-09-15 DIAGNOSIS — Z00.00 ANNUAL PHYSICAL EXAM: Primary | ICD-10-CM

## 2021-09-15 DIAGNOSIS — D50.9 IRON DEFICIENCY ANEMIA, UNSPECIFIED IRON DEFICIENCY ANEMIA TYPE: ICD-10-CM

## 2021-09-15 PROBLEM — Z23 VACCINE FOR HUMAN PAPILLOMA VIRUS (HPV) TYPES 6, 11, 16, AND 18 ADMINISTERED: Status: RESOLVED | Noted: 2019-11-25 | Resolved: 2021-09-15

## 2021-09-15 PROBLEM — Z20.822 SUSPECTED COVID-19 VIRUS INFECTION: Status: RESOLVED | Noted: 2021-02-15 | Resolved: 2021-09-15

## 2021-09-15 PROBLEM — Z23 NEED FOR VACCINATION: Status: RESOLVED | Noted: 2018-12-14 | Resolved: 2021-09-15

## 2021-09-15 PROBLEM — S10.93XA NECK CONTUSION: Status: RESOLVED | Noted: 2019-06-21 | Resolved: 2021-09-15

## 2021-09-15 LAB — SL AMB POCT URINE HCG: NEGATIVE

## 2021-09-15 PROCEDURE — 99395 PREV VISIT EST AGE 18-39: CPT | Performed by: FAMILY MEDICINE

## 2021-09-15 PROCEDURE — 81025 URINE PREGNANCY TEST: CPT | Performed by: FAMILY MEDICINE

## 2021-09-15 RX ORDER — RELUGOLIX, ESTRADIOL HEMIHYDRATE, AND NORETHINDRONE ACETATE 40; 1; .5 MG/1; MG/1; MG/1
1 TABLET, FILM COATED ORAL DAILY
COMMUNITY
Start: 2021-08-17

## 2021-09-15 NOTE — ASSESSMENT & PLAN NOTE
Symptoms now occurring daily with shortness of breath  Patient admits to relief with the use of albuterol  Given daily symptoms are mild, we will start patient on Advair as per new JENNIFER recommendations to use PRN  -  Continue to monitor and consider daily Advair use and continue albuterol all [place]

## 2021-09-15 NOTE — ASSESSMENT & PLAN NOTE
History of iron deficiency anemia in the past   Patient states that she recently found out she had fibroids and does have more frequent vaginal bleeding  Follows with OBGYN   -  Repeat CBC

## 2021-09-15 NOTE — ASSESSMENT & PLAN NOTE
Patient complains of weight gain  BMI Counseling: Body mass index is 32 86 kg/m²  The BMI is above normal  Nutrition recommendations include reducing portion sizes, 3-5 servings of fruits/vegetables daily, reducing fast food intake, consuming healthier snacks and increasing intake of lean protein  Exercise recommendations include moderate aerobic physical activity for 150 minutes/week and exercising 3-5 times per week  - TSH ordered  - Hemoglobin A1c

## 2021-09-16 ENCOUNTER — APPOINTMENT (OUTPATIENT)
Dept: PHYSICAL THERAPY | Facility: CLINIC | Age: 26
End: 2021-09-16
Payer: COMMERCIAL

## 2021-09-21 ENCOUNTER — OFFICE VISIT (OUTPATIENT)
Dept: PHYSICAL THERAPY | Facility: CLINIC | Age: 26
End: 2021-09-21
Payer: COMMERCIAL

## 2021-09-21 DIAGNOSIS — M46.1 SACROILIITIS (HCC): Primary | ICD-10-CM

## 2021-09-21 PROCEDURE — 97112 NEUROMUSCULAR REEDUCATION: CPT

## 2021-09-21 PROCEDURE — 97530 THERAPEUTIC ACTIVITIES: CPT

## 2021-09-21 NOTE — PROGRESS NOTES
Daily Note     Today's date: 2021  Patient name: Miguel Vizcaino  : 1995  MRN: [id number]  Referring provider: Madie Walter MD  Dx:   Encounter Diagnosis     ICD-10-CM    1  Sacroiliitis ([address] )  M46 1        Start Time: 1125  Stop Time: 1205  Total time in clinic (min): 40 minutes  Subjective: Pt reports cco increased (L) side pain along face, C/S, + shoulder for the last week, /c signficantly increased pain in last 2 days - pt brings in Rx to add body parts [place]  Objective: See treatment diary below    Assessment: Tolerated treatment fair - requiring fewer reps /c increased sets for improved tolerance to activity  Discussed building endurance and strength as visits progress  Pt demonstrates slight ext lag during SLR  Pt would benefit from continued PT  Plan: Cont /c PT POC  Progress as tolerated        Daily Treatment Diary  Precautions: none  Date     Visit Number 1 2 3 4    FOTO IE       Re-Eval IE          Manuals            Neuro Re-Ed             bridge  15x 2x15 30x     SLR  15x ea 2x15 30ea    clams   2x15 30ea                            Ther Ex    Standing hip abd/ext   15x ea 2x15     squats HEP 15x 2x15     Bike   5' 5' 5'     Hamstring stretch   20" x 5      Q/l stretch  HEP 10" x 10      LTR  10" x 5 ea      LP    20x 65#  Seat 3 75#x20  85#x10            Ther Activity                                    Modalities    MHP and TENS                    Isaura Vel, PTA

## 2021-09-23 ENCOUNTER — EVALUATION (OUTPATIENT)
Dept: PHYSICAL THERAPY | Facility: CLINIC | Age: 26
End: 2021-09-23
Payer: COMMERCIAL

## 2021-09-23 DIAGNOSIS — M26.609 TMJ (TEMPOROMANDIBULAR JOINT SYNDROME): Primary | ICD-10-CM

## 2021-09-23 PROCEDURE — 97110 THERAPEUTIC EXERCISES: CPT | Performed by: PHYSICAL THERAPIST

## 2021-09-23 PROCEDURE — 97161 PT EVAL LOW COMPLEX 20 MIN: CPT | Performed by: PHYSICAL THERAPIST
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
## 2021-09-28 ENCOUNTER — OFFICE VISIT (OUTPATIENT)
Dept: PHYSICAL THERAPY | Facility: CLINIC | Age: 26
End: 2021-09-28
Payer: COMMERCIAL

## 2021-09-28 DIAGNOSIS — M46.1 SACROILIITIS (HCC): ICD-10-CM

## 2021-09-28 DIAGNOSIS — M26.609 TMJ (TEMPOROMANDIBULAR JOINT SYNDROME): Primary | ICD-10-CM

## 2021-09-28 PROCEDURE — 97110 THERAPEUTIC EXERCISES: CPT

## 2021-09-28 PROCEDURE — 97530 THERAPEUTIC ACTIVITIES: CPT

## 2021-09-28 PROCEDURE — 97112 NEUROMUSCULAR REEDUCATION: CPT

## 2021-09-28 NOTE — PROGRESS NOTES
Daily Note     Today's date: 2021  Patient name: Lesia Babb  : 1995  MRN: [id number]  Referring provider: Coco Campuzano MD  Dx:   Encounter Diagnosis     ICD-10-CM    1  TMJ (temporomandibular joint syndrome)  M26 609    2  Sacroiliitis ([address] )  M46 1        Start Time: 1150  Stop Time: 9615  Total time in clinic (min): 55 minutes    Subjective: Pt reports she is feeling much better in the jaw and neck, states she has been compliant with her HEP  Pt states the back and the knee are bugging her  Objective: See treatment diary below      Assessment: Tolerated treatment well  Patient demonstrated fatigue post treatment and would benefit from continued PT  Pt reports performing exercises for her TMJD prior to coming to PT and would like to hold on those exercises for today  Pt continues to work towards goals of increased LE strength, flexibility, and core strength  Pt will benefit from further skilled PT to increase strength, flexibility and function  Continue to progress as able  Plan: Continue per plan of care        Precautions: none    Daily Treatment Diary    TMJD   Date       Visit Number 1 2      FOTO IE       Re-Eval IE          Manuals    Distraction         Lateral glide of mandible         Lateral pterygoid/ masseter  stm                Neuro Re-Ed     Mandibular protrusion iso   HEP       Mandible abd iso         Mandible lateral deviation iso                                         Ther Ex    UBE BW  5'      UT/ levator to R HEP       Snag to L                                                 Ther Activity                                    Modalities                      Precautions: LBP   Date    Visit Number 1 2 3 4 5   FOTO IE       Re-Eval IE          Manuals            Neuro Re-Ed             bridge  15x 2x15 30x  30x   SLR  15x ea 2x15 30ea 30x   clams   2x15 30ea 30x                           Ther Ex    Standing hip abd/ext   15x ea 2x15  2x15   squats HEP 15x 2x15  2x15   Bike   5' 5' 5'  7'   Hamstring stretch   20" x 5      Q/l stretch  HEP 10" x 10      LTR  10" x 5 ea      LP    20x 65#  Seat 3 75#x20  85#x10 Seat 3 79#x30           Ther Activity                                    Modalities    MHP and TENS

## 2021-09-30 ENCOUNTER — OFFICE VISIT (OUTPATIENT)
Dept: PHYSICAL THERAPY | Facility: CLINIC | Age: 26
End: 2021-09-30
Payer: COMMERCIAL

## 2021-09-30 DIAGNOSIS — M26.609 TMJ (TEMPOROMANDIBULAR JOINT SYNDROME): Primary | ICD-10-CM

## 2021-09-30 DIAGNOSIS — M46.1 SACROILIITIS (HCC): ICD-10-CM

## 2021-09-30 PROCEDURE — 97110 THERAPEUTIC EXERCISES: CPT | Performed by: PHYSICAL THERAPIST

## 2021-09-30 PROCEDURE — 97112 NEUROMUSCULAR REEDUCATION: CPT | Performed by: PHYSICAL THERAPIST

## 2021-09-30 NOTE — PROGRESS NOTES
Daily Note     Today's date: 2021  Patient name: Merlin Carrie  : 1995  MRN: [id number]  Referring provider: Chiqui Peralta MD  Dx:   Encounter Diagnosis     ICD-10-CM    1  TMJ (temporomandibular joint syndrome)  M26 609    2  Sacroiliitis ([facility])  M46 1                   Subjective: pt reports at the start of the session that she is doing well with both her back and her jaw  She has noticed improvement in her tolerance to opening and closing her mouth while chewing  Objective: See treatment diary below      Assessment: Tolerated treatment well  Patient was able to progress all of her interventions today without any issues except an occasional soreness in her L lateral hip  She is doing well with everything and will continue to progress next visit  Plan: Continue per plan of care        Precautions: none    Daily Treatment Diary    TMJD   Date      Visit Number 1 2 3     FOTO IE       Re-Eval IE          Manuals    Distraction         Lateral glide of mandible         Lateral pterygoid/ masseter  stm                Neuro Re-Ed     Mandibular protrusion iso   HEP       Mandible abd iso         Mandible lateral deviation iso                                         Ther Ex    UBE BW  5' 5'     UT/ levator to R HEP       Snag to L         duke row/ext   16R & 10R 2x15                                     Ther Activity                                    Modalities                      Precautions: LBP   Date    Visit Number 6  3 4 5   FOTO xx       Re-Eval           Manuals            Neuro Re-Ed             bridge   2x15 30x  30x   SLR   2x15 30ea 30x   clams   2x15 30ea 30x   Side steps BTB 5 laps       Step down 1R  20x               Ther Ex    Standing hip abd/ext    2x15  2x15   squats 30x  2x15  2x15   Bike  elip 5  5' 5'  7'   Hamstring stretch         Q/l stretch         LTR        LP  91# 2x15  20x 65#  Seat 3 75#x20  85#x10 Seat 3 79#x30   IT band 20"x5       Ther Activity    lunge 20x                                Modalities    MHP and TENS

## 2021-10-05 ENCOUNTER — OFFICE VISIT (OUTPATIENT)
Dept: AUDIOLOGY | Age: 26
End: 2021-10-05

## 2021-10-05 DIAGNOSIS — H90.3 SENSORY HEARING LOSS, BILATERAL: Primary | ICD-10-CM

## 2021-10-13 ENCOUNTER — OFFICE VISIT (OUTPATIENT)
Dept: DENTISTRY | Facility: CLINIC | Age: 26
End: 2021-10-13

## 2021-10-13 VITALS — TEMPERATURE: 96.8 F

## 2021-10-13 DIAGNOSIS — Z01.21 ENCOUNTER FOR DENTAL EXAMINATION AND CLEANING WITH ABNORMAL FINDINGS: Primary | ICD-10-CM

## 2021-10-13 PROCEDURE — D0191 ASSESSMENT OF A PATIENT: HCPCS | Performed by: DENTIST

## 2021-11-10 ENCOUNTER — OFFICE VISIT (OUTPATIENT)
Dept: AUDIOLOGY | Age: 26
End: 2021-11-10
Payer: COMMERCIAL

## 2021-11-10 DIAGNOSIS — H90.3 SENSORY HEARING LOSS, BILATERAL: Primary | ICD-10-CM

## 2021-11-10 PROCEDURE — V5274 ALD UNSPECIFIED: HCPCS | Performed by: AUDIOLOGIST

## 2021-11-10 PROCEDURE — V5257 HEARING AID, DIGIT, MON, BTE: HCPCS | Performed by: AUDIOLOGIST

## 2021-11-10 PROCEDURE — V5241 DISPENSING FEE, MONAURAL: HCPCS | Performed by: AUDIOLOGIST

## 2021-12-13 ENCOUNTER — OFFICE VISIT (OUTPATIENT)
Dept: AUDIOLOGY | Age: 26
End: 2021-12-13

## 2021-12-13 DIAGNOSIS — H90.3 SENSORY HEARING LOSS, BILATERAL: Primary | ICD-10-CM

## 2021-12-14 ENCOUNTER — APPOINTMENT (OUTPATIENT)
Dept: LAB | Facility: HOSPITAL | Age: 26
End: 2021-12-14
Attending: PSYCHIATRY & NEUROLOGY
Payer: COMMERCIAL

## 2021-12-14 DIAGNOSIS — R51.9 GENERALIZED HEADACHES: ICD-10-CM

## 2021-12-14 DIAGNOSIS — E66.09 CLASS 1 OBESITY DUE TO EXCESS CALORIES WITHOUT SERIOUS COMORBIDITY WITH BODY MASS INDEX (BMI) OF 32.0 TO 32.9 IN ADULT: ICD-10-CM

## 2021-12-14 DIAGNOSIS — Z11.59 NEED FOR HEPATITIS C SCREENING TEST: ICD-10-CM

## 2021-12-14 DIAGNOSIS — Z11.4 SCREENING FOR HIV (HUMAN IMMUNODEFICIENCY VIRUS): ICD-10-CM

## 2021-12-14 DIAGNOSIS — D50.9 IRON DEFICIENCY ANEMIA, UNSPECIFIED IRON DEFICIENCY ANEMIA TYPE: ICD-10-CM

## 2021-12-14 LAB
EOSINOPHIL # BLD AUTO: 0.1 THOUSAND/UL (ref 0–0.4)
EOSINOPHIL NFR BLD MANUAL: 2 % (ref 0–6)
ERYTHROCYTE [DISTWIDTH] IN BLOOD BY AUTOMATED COUNT: 13.8 %
EST. AVERAGE GLUCOSE BLD GHB EST-MCNC: 105 MG/DL
HBA1C MFR BLD: 5.3 %
HCT VFR BLD AUTO: 37.7 % (ref 36–46)
HCV AB SER QL: NORMAL
HGB BLD-MCNC: 12.6 G/DL (ref 12–16)
LYMPHOCYTES # BLD AUTO: 2.21 THOUSAND/UL (ref 0.5–4)
LYMPHOCYTES # BLD AUTO: 45 % (ref 25–45)
MCH RBC QN AUTO: 30.3 PG (ref 26–34)
MCHC RBC AUTO-ENTMCNC: 33.4 G/DL (ref 31–36)
MCV RBC AUTO: 91 FL (ref 80–100)
MONOCYTES # BLD AUTO: 0.69 THOUSAND/UL (ref 0.2–0.9)
MONOCYTES NFR BLD AUTO: 14 % (ref 1–10)
NEUTS SEG # BLD: 1.91 THOUSAND/UL (ref 1.8–7.8)
NEUTS SEG NFR BLD AUTO: 39 %
PLATELET # BLD AUTO: 223 THOUSANDS/UL (ref 150–450)
PLATELET BLD QL SMEAR: ADEQUATE
PMV BLD AUTO: 10.2 FL (ref 8.9–12.7)
RBC # BLD AUTO: 4.15 MILLION/UL (ref 4–5.2)
RBC MORPH BLD: NORMAL
TOTAL CELLS COUNTED SPEC: 100
TSH SERPL DL<=0.05 MIU/L-ACNC: 1.3 UIU/ML (ref 0.47–4.68)
WBC # BLD AUTO: 4.9 THOUSAND/UL (ref 4.5–11)

## 2021-12-14 PROCEDURE — 86803 HEPATITIS C AB TEST: CPT

## 2021-12-14 PROCEDURE — 36415 COLL VENOUS BLD VENIPUNCTURE: CPT

## 2021-12-14 PROCEDURE — 84443 ASSAY THYROID STIM HORMONE: CPT

## 2021-12-14 PROCEDURE — 85027 COMPLETE CBC AUTOMATED: CPT

## 2021-12-14 PROCEDURE — 85007 BL SMEAR W/DIFF WBC COUNT: CPT

## 2021-12-14 PROCEDURE — 83036 HEMOGLOBIN GLYCOSYLATED A1C: CPT

## 2021-12-14 PROCEDURE — 87389 HIV-1 AG W/HIV-1&-2 AB AG IA: CPT

## 2021-12-15 ENCOUNTER — OFFICE VISIT (OUTPATIENT)
Dept: FAMILY MEDICINE CLINIC | Facility: CLINIC | Age: 26
End: 2021-12-15

## 2021-12-15 VITALS
BODY MASS INDEX: 33.65 KG/M2 | SYSTOLIC BLOOD PRESSURE: 112 MMHG | WEIGHT: 214.4 LBS | HEART RATE: 88 BPM | DIASTOLIC BLOOD PRESSURE: 74 MMHG | OXYGEN SATURATION: 99 % | RESPIRATION RATE: 18 BRPM | HEIGHT: 67 IN | TEMPERATURE: 98.5 F

## 2021-12-15 DIAGNOSIS — J45.30 MILD PERSISTENT ASTHMA WITHOUT COMPLICATION: ICD-10-CM

## 2021-12-15 DIAGNOSIS — R63.5 WEIGHT GAIN: Primary | ICD-10-CM

## 2021-12-15 DIAGNOSIS — R09.82 POSTNASAL DRIP: ICD-10-CM

## 2021-12-15 DIAGNOSIS — J45.40 MODERATE PERSISTENT ASTHMA WITHOUT COMPLICATION: ICD-10-CM

## 2021-12-15 DIAGNOSIS — J30.9 ALLERGIC RHINITIS, UNSPECIFIED SEASONALITY, UNSPECIFIED TRIGGER: ICD-10-CM

## 2021-12-15 DIAGNOSIS — J45.20 MILD INTERMITTENT ASTHMA WITHOUT COMPLICATION: ICD-10-CM

## 2021-12-15 LAB — HIV 1+2 AB+HIV1 P24 AG SERPL QL IA: NORMAL

## 2021-12-15 PROCEDURE — 99213 OFFICE O/P EST LOW 20 MIN: CPT | Performed by: FAMILY MEDICINE

## 2021-12-15 RX ORDER — FLUTICASONE PROPIONATE 50 MCG
1 SPRAY, SUSPENSION (ML) NASAL DAILY
Qty: 16 G | Refills: 2 | Status: SHIPPED | OUTPATIENT
Start: 2021-12-15

## 2021-12-15 RX ORDER — MONTELUKAST SODIUM 10 MG/1
10 TABLET ORAL
Qty: 90 TABLET | Refills: 3 | Status: SHIPPED | OUTPATIENT
Start: 2021-12-15

## 2021-12-15 RX ORDER — ALBUTEROL SULFATE 90 UG/1
2 AEROSOL, METERED RESPIRATORY (INHALATION) EVERY 4 HOURS PRN
Qty: 18 G | Refills: 1 | Status: SHIPPED | OUTPATIENT
Start: 2021-12-15 | End: 2022-06-27 | Stop reason: SDUPTHER

## 2021-12-15 RX ORDER — LORATADINE 10 MG/1
10 TABLET ORAL DAILY
Qty: 30 TABLET | Refills: 1 | Status: SHIPPED | OUTPATIENT
Start: 2021-12-15

## 2021-12-17 ENCOUNTER — OFFICE VISIT (OUTPATIENT)
Dept: BARIATRICS | Facility: CLINIC | Age: 26
End: 2021-12-17

## 2021-12-17 VITALS — HEIGHT: 66 IN | WEIGHT: 209.3 LBS | BODY MASS INDEX: 33.64 KG/M2

## 2021-12-17 DIAGNOSIS — R63.5 WEIGHT GAIN: ICD-10-CM

## 2021-12-17 DIAGNOSIS — R63.5 ABNORMAL WEIGHT GAIN: ICD-10-CM

## 2021-12-17 PROCEDURE — RECHECK

## 2021-12-17 PROCEDURE — WMDI30

## 2022-01-03 ENCOUNTER — OFFICE VISIT (OUTPATIENT)
Dept: AUDIOLOGY | Age: 27
End: 2022-01-03

## 2022-01-03 DIAGNOSIS — H90.3 SENSORY HEARING LOSS, BILATERAL: Primary | ICD-10-CM

## 2022-01-03 NOTE — PROGRESS NOTES
Hearing Aid Visit:    Name:  Alec Escobedo  :  1995  Age:  27 y o  Date of Evaluation: 22     Alec Escobedo is being seen for a hearing aid visit  Patient is fit with [employer] [facility] hearing aid(s)  Left serial number [id number]  Warranty date: 24 (Loss/Damage and repair)  Patient reports she was having trouble understanding speech in noisy environments as well as some one on one situations  Action:  Checked hearing aid  Increased overall gain to help with understanding  Patient was happy with overall sound quality  Discussed processing issues being related to her concerns for not understanding speech  Given that the right ear has normal hearing and understanding, in addition to the help from the left hearing, these concerns seem to be related to an issue with processing  Discussed some strategies to help in noisy environments  Recommendations: Follow up for hearing test in two weeks       Estefania Pulido , CCC-A  Clinical Audiologist

## 2022-02-21 ENCOUNTER — OFFICE VISIT (OUTPATIENT)
Dept: DENTISTRY | Facility: CLINIC | Age: 27
End: 2022-02-21

## 2022-02-21 VITALS — DIASTOLIC BLOOD PRESSURE: 71 MMHG | TEMPERATURE: 97.2 F | SYSTOLIC BLOOD PRESSURE: 105 MMHG | HEART RATE: 82 BPM

## 2022-02-21 DIAGNOSIS — K02.9 DENTAL CARIES: ICD-10-CM

## 2022-02-21 DIAGNOSIS — Z01.20 ENCOUNTER FOR DENTAL EXAMINATION: Primary | ICD-10-CM

## 2022-02-21 DIAGNOSIS — K03.6 ACCRETIONS ON TEETH: ICD-10-CM

## 2022-02-21 DIAGNOSIS — K03.6 DENTAL CALCULUS: ICD-10-CM

## 2022-02-21 PROCEDURE — D1110 PROPHYLAXIS - ADULT: HCPCS

## 2022-02-21 PROCEDURE — D0120 PERIODIC ORAL EVALUATION - ESTABLISHED PATIENT: HCPCS

## 2022-02-21 PROCEDURE — D0274 BITEWINGS - 4 RADIOGRAPHIC IMAGES: HCPCS

## 2022-02-21 NOTE — PROGRESS NOTES
Prophy    Dental procedures in this visit     - PERIODIC ORAL EVALUATION - ESTABLISHED PATIENT (Completed)     Service provider: Justine Gutierrez     Billing provider: Spencer Cortes DMD     - PROPHYLAXIS - ADULT (Completed)     [facility] [address] [place] [address]     Billing provider: Spencer Cortes DMD     - BITEWINGS - 4 RADIOGRAPHIC IMAGES (Completed)     [facility] [address] [place] [address]     Billing provider: Spencer Cortes DMD   ASA 1      Method Used:  · Prophy Method Used: Hand Scaling  · Polished  · Flossed    Radiographs Taken:  · Bitewings x4    Intra/Extra Oral Cancer Screening:  · Divina/Torus    Orthodontic Screening:  · she has an essex retainer she uses for grinding  may be interested in ortho consult at future date    Oral Hygiene:  · Good    Plaque:  · Generalized  · Light    Calculus:  · Generalized  · Light  ·  generalized light cervical calc   and calc  S 5    Bleeding:  Bleeding on probing: No periodontal exam for this visit  · Localized  · Light    Stain:  · None    Periodontal Charting:  · Spot probing  1-3 mm generally   Tissue pink  Slight bleeding with scaling molar areas    Periodontal Classification:  · Mild  · Gingivitis     Dr Michelle Garcia checked bite # 30  She bit funny a few weeks ago and her teeth bothered her but better now  Adjust bite if problem persisits  6 mos recall    No decay      ·       No orders of the defined types were placed in this encounter

## 2022-03-17 ENCOUNTER — OFFICE VISIT (OUTPATIENT)
Dept: FAMILY MEDICINE CLINIC | Facility: CLINIC | Age: 27
End: 2022-03-17

## 2022-03-17 VITALS
DIASTOLIC BLOOD PRESSURE: 76 MMHG | SYSTOLIC BLOOD PRESSURE: 122 MMHG | HEART RATE: 65 BPM | BODY MASS INDEX: 34.54 KG/M2 | HEIGHT: 66 IN | TEMPERATURE: 97.7 F | RESPIRATION RATE: 18 BRPM | WEIGHT: 214.9 LBS | OXYGEN SATURATION: 99 %

## 2022-03-17 DIAGNOSIS — M46.1 SACROILIITIS (HCC): ICD-10-CM

## 2022-03-17 DIAGNOSIS — R29.898 WEAKNESS OF LEFT UPPER EXTREMITY: ICD-10-CM

## 2022-03-17 DIAGNOSIS — F32.1 CURRENT MODERATE EPISODE OF MAJOR DEPRESSIVE DISORDER WITHOUT PRIOR EPISODE (HCC): Primary | ICD-10-CM

## 2022-03-17 DIAGNOSIS — G89.29 CHRONIC LEFT SHOULDER PAIN: ICD-10-CM

## 2022-03-17 DIAGNOSIS — M25.512 CHRONIC LEFT SHOULDER PAIN: ICD-10-CM

## 2022-03-17 PROCEDURE — 99213 OFFICE O/P EST LOW 20 MIN: CPT | Performed by: INTERNAL MEDICINE

## 2022-03-17 RX ORDER — NAPROXEN 500 MG/1
500 TABLET ORAL 2 TIMES DAILY PRN
Qty: 30 TABLET | Refills: 0 | Status: SHIPPED | OUTPATIENT
Start: 2022-03-17

## 2022-03-17 RX ORDER — CYCLOBENZAPRINE HCL 5 MG
5 TABLET ORAL 3 TIMES DAILY PRN
Qty: 30 TABLET | Refills: 0 | Status: SHIPPED | OUTPATIENT
Start: 2022-03-17
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
## 2022-03-21 ENCOUNTER — PATIENT OUTREACH (OUTPATIENT)
Dept: FAMILY MEDICINE CLINIC | Facility: CLINIC | Age: 27
End: 2022-03-21

## 2022-03-21 NOTE — PROGRESS NOTES
FRANCO ADRIAN did receive referral from provider regarding Pt is interested in [address] service  After chart review FRANCO ADRIAN did call Pt  FRANCO ADRIAN introduced herself and her role  FRANCO ADRIAN explained Pt that per chart review this seems she is interested in [address] service, also, list of [address] provider has been given  Pt stated that the information above is correct, Also, she attempted to contact [address] clinic with no response  FRANCO ADRIAN encouraged Pt to call others [address] clinic or go in person to schedule [address] appointment  Also, FRANCO ADRIAN asked Pt if there is other social needs that FRANCO ADRIAN can assist her at this time  Pt declined social needs  FRANCO ADRIAN explained Pt that she can reach out FRANCO  for future assistance, also, FRANCO ADRIAN will close this referral since she declined social needs at this time  Pt seems understanding  FRANCO ADRIAN will close this referral since Pt declined social needs at this time  RFANCO ADRIAN is remain available for further assistance as needed

## 2022-04-05 ENCOUNTER — EVALUATION (OUTPATIENT)
Dept: PHYSICAL THERAPY | Facility: CLINIC | Age: 27
End: 2022-04-05
Payer: COMMERCIAL

## 2022-04-05 DIAGNOSIS — R29.898 WEAKNESS OF LEFT UPPER EXTREMITY: Primary | ICD-10-CM

## 2022-04-05 DIAGNOSIS — M46.1 SACROILIITIS (HCC): ICD-10-CM

## 2022-04-05 PROCEDURE — 97162 PT EVAL MOD COMPLEX 30 MIN: CPT

## 2022-04-05 PROCEDURE — 97530 THERAPEUTIC ACTIVITIES: CPT
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
## 2022-04-12 ENCOUNTER — OFFICE VISIT (OUTPATIENT)
Dept: PHYSICAL THERAPY | Facility: CLINIC | Age: 27
End: 2022-04-12
Payer: COMMERCIAL

## 2022-04-12 DIAGNOSIS — M46.1 SACROILIITIS (HCC): ICD-10-CM

## 2022-04-12 DIAGNOSIS — R29.898 WEAKNESS OF LEFT UPPER EXTREMITY: Primary | ICD-10-CM

## 2022-04-12 PROCEDURE — 97530 THERAPEUTIC ACTIVITIES: CPT

## 2022-04-12 PROCEDURE — 97112 NEUROMUSCULAR REEDUCATION: CPT

## 2022-04-12 PROCEDURE — 97110 THERAPEUTIC EXERCISES: CPT

## 2022-04-12 NOTE — PROGRESS NOTES
Daily Note     Today's date: 2022  Patient name: Anali Sky  : 1995  MRN: [id number]  Referring provider: Chava Thompson MD  Dx:   Encounter Diagnosis     ICD-10-CM    1  Weakness of left upper extremity  R29 898    2  Sacroiliitis (HCC)  M46 1                   Subjective: Pt reports no significant changes since LV, reporting occasional flare ups of (L) shoulder pain  She uses MH which helps reduce this pain  She did a lot of walking over the weekend which flared up her back, LE's, and knees  Objective: See treatment diary below      Assessment: Tolerated treatment well  Initiated exercises per treatment diary below  Instructed pt to inform therapist of any bouts of sharp pain  Educated pt today about appropriate sensations during TE's; goals of stretching exercises; referred pain to ear; and DOMs  Provided pt updated HEP this AM consisting of stretches to perform daily at home  Patient demonstrated fatigue post treatment, exhibited good technique with therapeutic exercises and would benefit from continued PT to progress upper quarter mobility and periscapular endurance to reduce stresses on the spine and improve pt's tolerance to lifting/reaching daily  Plan: Continue per plan of care  Progress treatment as tolerated  Precautions: Asthma; hearing loss (L) ear; hx TMJ; syncope; insomnia; (L) shoulder pain; depression    Date            FOTO IE            Re-Eval IE                Manuals            STM (L) UT, scalenes, C/S PS             (L) Hip Flex, ER MWM                                       Neuro Re-Ed            C/S Retractions nv 20x3"           Scap Retractions nv 20x5"           "Swimmers" Median N Glides nv            Tband Rows/Ext             Hip Hike nv nv           SLS nv nv           March + Hip Hike             March + Suitcase Carry             TVA Contractions nv            TVA + March             TVA + SLR             Bird Dogs Ther Ex 4/5 4/12           Seated T/S Ext over ball nv 20x3"           Seated UT (S) nv 5x10"           Seated LS (S) nv 5x10"           Seated QL (S) nv 5x10" ea           Seated HS (S)  nv 4x20" ea           Corner Pec (S)             Standing Cane Ext nv            Functional IR (S) BTB             Iso Tband IR/ER nv            Scaption             Piriformis (S) on (L) nv            Supine pec (S) on 1/2 foam             Bridges nv            SL ER             SL Shoulder Flex             SL Shoulder Abd             Clams nv            SL Hip Abd             Open Books nv            Prone Y, T, I's                          Ther Activity 4/5 4/12           Recumbent bike nv 6' L0           Pulleys nv 3' flex           Seated SWB Flex  nv 2'           Sidesteps             Monster Walks             Crossover Step Ups             Pt Edu 10' [place] AL                        Gait Training 4/5 4/12                                     Modalities 4/5 4/12           Prn

## 2022-04-14 ENCOUNTER — OFFICE VISIT (OUTPATIENT)
Dept: PHYSICAL THERAPY | Facility: CLINIC | Age: 27
End: 2022-04-14
Payer: COMMERCIAL

## 2022-04-14 DIAGNOSIS — R29.898 WEAKNESS OF LEFT UPPER EXTREMITY: Primary | ICD-10-CM

## 2022-04-14 DIAGNOSIS — M46.1 SACROILIITIS (HCC): ICD-10-CM

## 2022-04-14 PROCEDURE — 97530 THERAPEUTIC ACTIVITIES: CPT

## 2022-04-14 PROCEDURE — 97110 THERAPEUTIC EXERCISES: CPT

## 2022-04-14 NOTE — PROGRESS NOTES
Daily Note     Today's date: 2022  Patient name: Nash Palm  : 1995  MRN: [id number]  Referring provider: Georges Richey MD  Dx:   Encounter Diagnosis     ICD-10-CM    1  Weakness of left upper extremity  R29 898    2  Sacroiliitis ([facility] [address] )  M46 1        Start Time: 1000  Stop Time: 1051  Total time in clinic (min): 51 minutes  Subjective: Pt denies adverse Sx following implementation of exercise diary LV - notes some discomfort in (L) shldr yesterday  Objective: See treatment diary below    Assessment: Tolerated treatment well  Pt notes fatigue in glute + hip musculature /c addition of bridges + clamshells  Pt notes she has difficulty /c open books 2/2 discomfort in neck and difficulty stabilizing (L)UE - modifed to elbows bent and adjust pillows for a neutral C/S /c pt noting improved tolerance but has slow speed and is unable to hold for a stretch - notes slight tingling in fingertips after about 15x reps  Followed /c swimmer nerve tensionsers /c pt noting neural tension along (B/L) UE's, (L) > (R), with slight tingling in 2/3rd distal digits  Provided pt education re: neural tension and relation to musculoskeletal system  Pt demonstrated fatigue post treatment, exhibited good technique with therapeutic exercises and would benefit from continued PT to progress upper quarter mobility and periscapular endurance to reduce stresses on the spine and improve pt's tolerance to lifting/reaching daily  Plan: Cont /c PT POC  Progress as tolerated  Precautions: Asthma; hearing loss (L) ear; hx TMJ; syncope; insomnia; (L) shoulder pain; depression    Date           Visit 1 2 3          FOTO IE            Re-Eval IE                Manuals           STM (L) UT, cosme, C/S PS             (L) Hip Flex, ER MWM                                       Neuro Re-Ed           C/S Retractions nv 20x3"           Scap Retractions nv 20x5"           "Swimmers" Median N Glides nv  3x10          Tband Rows/Ext             Hip Hike nv nv           SLS nv nv           March + Hip Hike             March + Suitcase Carry             TVA Contractions nv            TVA + March             TVA + SLR             Bird Dogs                           Ther Ex 4/5 4/12 04/14          Seated T/S Ext over ball nv 20x3"           Seated UT (S) nv 5x10"           Seated LS (S) nv 5x10"           Seated QL (S) nv 5x10" ea           Seated HS (S)  nv 4x20" ea           Corner Pec (S)             Standing Cane Ext nv            Functional IR (S) BTB             Iso Tband IR/ER nv            Scaption             Piriformis (S)  (L) nv  10"x10          Supine pec (S) on 1/2 foam             Bridges nv  35x           SL ER             SL Shoulder Flex             SL Shoulder Abd             Clams nv  20ea          SL Hip Abd             Open Books nv  20x L          Prone Y, T, I's                          Ther Activity 4/5 4/12 04/14          Recumbent bike nv 6' L0 6' L0          Pulleys nv 3' flex 3'          Seated SWB Flex  nv 2' 3'          Sidesteps             Monster Walks             Crossover Step Ups             Pt Edu 10' [facility]                       Gait Training 4/5 4/12 04/14                                    Modalities 4/5 4/12 04/14          Prn                              Nakita Main, PTA

## 2022-04-18 ENCOUNTER — OFFICE VISIT (OUTPATIENT)
Dept: FAMILY MEDICINE CLINIC | Facility: CLINIC | Age: 27
End: 2022-04-18

## 2022-04-18 VITALS
DIASTOLIC BLOOD PRESSURE: 80 MMHG | TEMPERATURE: 97.7 F | SYSTOLIC BLOOD PRESSURE: 110 MMHG | WEIGHT: 214 LBS | BODY MASS INDEX: 34.8 KG/M2 | RESPIRATION RATE: 18 BRPM | OXYGEN SATURATION: 98 % | HEART RATE: 90 BPM

## 2022-04-18 DIAGNOSIS — F32.1 CURRENT MODERATE EPISODE OF MAJOR DEPRESSIVE DISORDER WITHOUT PRIOR EPISODE (HCC): Primary | ICD-10-CM

## 2022-04-18 PROCEDURE — 99213 OFFICE O/P EST LOW 20 MIN: CPT | Performed by: INTERNAL MEDICINE
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
## 2022-04-19 ENCOUNTER — OFFICE VISIT (OUTPATIENT)
Dept: PHYSICAL THERAPY | Facility: CLINIC | Age: 27
End: 2022-04-19
Payer: COMMERCIAL

## 2022-04-19 DIAGNOSIS — R29.898 WEAKNESS OF LEFT UPPER EXTREMITY: Primary | ICD-10-CM

## 2022-04-19 DIAGNOSIS — M46.1 SACROILIITIS (HCC): ICD-10-CM

## 2022-04-19 PROCEDURE — 97110 THERAPEUTIC EXERCISES: CPT

## 2022-04-19 PROCEDURE — 97530 THERAPEUTIC ACTIVITIES: CPT

## 2022-04-19 NOTE — PROGRESS NOTES
Daily Note     Today's date: 2022  Patient name: Darrell Barros  : 1995  MRN: [id number]  Referring provider: Rekha Scott MD  Dx:   Encounter Diagnosis     ICD-10-CM    1  Weakness of left upper extremity  R29 898    2  Sacroiliitis ([facility] [address] )  M46 1                   Subjective: Pt states that she is doing "okay" today however attributes this to "that time of the month "      Objective: See treatment diary below      Assessment: Tolerated treatment well  Pt appropriately challenged with addition of tband ER today  Educated pt today about goals of isometric strengthening  Patient demonstrated fatigue post treatment, exhibited good technique with therapeutic exercises and would benefit from continued PT to progress core stab and periscapular strength to improve pt's tolerance to standing and walking prolonged periods of time  Plan: Continue per plan of care  Progress treatment as tolerated  Pt to schedule future appts for next week on Thursday - did not have her calendar with her at today's appt to do so  Precautions: Asthma; hearing loss (L) ear; hx TMJ; syncope; insomnia; (L) shoulder pain; depression    Date          Visit 1 2 3 4         FOTO IE            Re-Eval IE                Manuals          STM (L) UT, scalenes, C/S PS             (L) Hip Flex, ER MWM                                       Neuro Re-Ed          C/S Retractions nv 20x3"           Scap Retractions nv 20x5"           "Swimmers" Median N Glides nv  3x10          Tband Rows/Ext    GTB 20x3" ea         Hip Hike nv nv           SLS nv nv           March + Hip Hike             March + Suitcase Carry             TVA Contractions nv            TVA + March             TVA + SLR             Bird Dogs                           Ther Ex          Seated T/S Ext over ball nv 20x3"           Seated UT (S) nv 5x10"           Seated LS (S) nv 5x10" Seated QL (S) nv 5x10" ea           Seated HS (S)  nv 4x20" ea           Corner Pec (S)             Standing Cane Ext nv            Functional IR (S) BTB             Iso Tband IR/ER nv   RTB 10x ea         Scaption             Piriformis (S)  (L) nv  10"x10 10x10"         Supine pec (S) on 1/2 foam             Bridges nv  35x  30x         SL ER             SL Shoulder Flex             SL Shoulder Abd             Clams nv  20ea 20x ea         SL Hip Abd             Open Books nv  20x L 10x5" (L)         Prone Y, T, I's                          Ther Activity 4/5 4/12 04/14 4/19         Recumbent bike nv 6' L0 6' L0 6' L0          Pulleys nv 3' flex 3' 3'         Seated SWB Flex  nv 2' 3' 3'         Sidesteps             Monster Walks             Crossover Step Ups             Pt Edu 10' AL [facility] AL                      Gait Training 4/5 4/12 04/14 4/19                                   Modalities 4/5 4/12 04/14 4/19         Prn

## 2022-04-20 ENCOUNTER — OFFICE VISIT (OUTPATIENT)
Dept: AUDIOLOGY | Age: 27
End: 2022-04-20
Payer: COMMERCIAL

## 2022-04-20 DIAGNOSIS — H90.3 SENSORY HEARING LOSS, BILATERAL: Primary | ICD-10-CM

## 2022-04-20 PROCEDURE — 92557 COMPREHENSIVE HEARING TEST: CPT | Performed by: AUDIOLOGIST

## 2022-04-20 PROCEDURE — 92567 TYMPANOMETRY: CPT | Performed by: AUDIOLOGIST

## 2022-04-20 NOTE — PROGRESS NOTES
HEARING EVALUATION/HEARING AID VISIT    Name:  Sen Park  :  1995  Age:  27 y o  Date of Evaluation: 22     History: Known Hearing Loss in the left ear only  Reason for visit: Sen Park is being seen today at the request of Dr Georgie Hogan for an evaluation of hearing  Patient reports no issues or preceived changes to hearing ability  Otoscopic Evaluation:   Right Ear: Clear and healthy ear canal and tympanic membrane   Left Ear: Clear and healthy ear canal and tympanic membrane    Tympanometry:   Right: Type A - normal middle ear pressure and compliance   Left: Type A - normal middle ear pressure and compliance    Audiogram Results:  Pure tone testing revealed a modeartely severe sensorineural hearing loss in the  left  ear and a normal hearing in the  right ear  SRT and PTA are in agreement indicating good test reliability  Word recognition scores were excellent bilaterally  *see attached audiogram      Sen Park Patient is fit with [employer] Mini [employer] hearing aid(s)  Left serial [id number]  Warranty date: 24 (Loss/Damage and repair)  Patient reports she feels like she is struggling to hear/understand speech  Action:  Cleaned and checked hearing aid  Increased overall gain 2 clicks  Increased high frequency gain several clicks to improve speech understanding  Paitent noted better sound quality and feeling more balanced        RECOMMENDATIONS:  Annual hearing eval, Return to [facility]  for F/U and Copy to Patient/Caregiver      Estefania Shin , [facility]  Clinical Audiologist

## 2022-04-21 ENCOUNTER — OFFICE VISIT (OUTPATIENT)
Dept: PHYSICAL THERAPY | Facility: CLINIC | Age: 27
End: 2022-04-21
Payer: COMMERCIAL

## 2022-04-21 DIAGNOSIS — R29.898 WEAKNESS OF LEFT UPPER EXTREMITY: Primary | ICD-10-CM

## 2022-04-21 DIAGNOSIS — M46.1 SACROILIITIS (HCC): ICD-10-CM

## 2022-04-21 PROCEDURE — 97110 THERAPEUTIC EXERCISES: CPT

## 2022-04-21 PROCEDURE — 97530 THERAPEUTIC ACTIVITIES: CPT

## 2022-04-21 PROCEDURE — 97112 NEUROMUSCULAR REEDUCATION: CPT

## 2022-04-21 NOTE — PROGRESS NOTES
Daily Note     Today's date: 2022  Patient name: Kunal Disla  : 1995  MRN: [id number]  Referring provider: Tito Mendoza MD  Dx:   Encounter Diagnosis     ICD-10-CM    1  Weakness of left upper extremity  R29 898    2  Sacroiliitis ([facility] [address] )  M46 1                   Subjective: Pt reports being sore from putting furniture together  Objective: See treatment diary below      Assessment: Pt did well with all TE as listed, reports no increased pain during or post tx  Plan: Continue per plan of care  Precautions: Asthma; hearing loss (L) ear; hx TMJ; syncope; insomnia; (L) shoulder pain; depression    Date         Visit 1 2 3 4 5        FOTO IE            Re-Eval IE                Manuals         STM (L) UT, scalenes, C/S PS             (L) Hip Flex, ER MWM                                       Neuro Re-Ed         C/S Retractions nv 20x3"           Scap Retractions nv 20x5"           "Swimmers" Median N Glides nv  3x10          Tband Rows/Ext    GTB 20x3" ea GTB 20x3" ea        Hip Hike nv nv           SLS nv nv           March + Hip Hike             March + Suitcase Carry             TVA Contractions nv            TVA + March             TVA + SLR             Bird Dogs                           Ther Ex         Seated T/S Ext over ball nv 20x3"           Seated UT (S) nv 5x10"           Seated LS (S) nv 5x10"           Seated QL (S) nv 5x10" ea           Seated HS (S)  nv 4x20" ea           Corner Pec (S)             Standing Cane Ext nv            Functional IR (S) BTB             Iso Tband IR/ER nv   RTB 10x ea RTB 10x ea        Scaption             Piriformis (S)  (L) nv  10"x10 10x10" 10x10"        Supine pec (S) on 1/2 foam             Bridges nv  35x  30x 30x        SL ER             SL Shoulder Flex             SL Shoulder Abd             Clams nv  20ea 20x ea 20x ea        SL Hip Abd Open Books nv  20x L 10x5" (L) 10x5" (L)        Prone Y, T, I's                          Ther Activity 4/5 4/12 04/14 4/19 4/21        Recumbent bike nv 6' L0 6' L0 6' L0          Pulleys nv 3' flex 3' 3' 3'        Seated SWB Flex  nv 2' 3' 3' 3'        Sidesteps             Monster Walks             Crossover Step Ups             Pt Edu 10' AL [facility] AL                      Gait Training 4/5 4/12 04/14 4/19                                   Modalities 4/5 4/12 04/14 4/19         Prn

## 2022-04-25 ENCOUNTER — OFFICE VISIT (OUTPATIENT)
Dept: PHYSICAL THERAPY | Facility: CLINIC | Age: 27
End: 2022-04-25
Payer: COMMERCIAL

## 2022-04-25 DIAGNOSIS — M46.1 SACROILIITIS (HCC): ICD-10-CM

## 2022-04-25 DIAGNOSIS — R29.898 WEAKNESS OF LEFT UPPER EXTREMITY: Primary | ICD-10-CM

## 2022-04-25 PROCEDURE — 97530 THERAPEUTIC ACTIVITIES: CPT

## 2022-04-25 PROCEDURE — 97112 NEUROMUSCULAR REEDUCATION: CPT

## 2022-04-25 NOTE — PROGRESS NOTES
Daily Note     Today's date: 2022  Patient name: Vandana Monsalve  : 1995  MRN: [id number]  Referring provider: Shonna Nunez MD  Dx:   Encounter Diagnosis     ICD-10-CM    1  Weakness of left upper extremity  R29 898    2  Sacroiliitis ([address] )  M46 1        Start Time: 419  Stop Time:   Total time in clinic (min): 32 minutes  Subjective: Pt denies adverse Sx following LV  Pt arrives to Tx noting (L) C/S pain Sx /c some N/T in (L)UE + tongue - notes this started after she had a bout of serious coughing as if she was choking; pt notes this occurred after she turned the heat on in her car and felt like she may have swallowed something  Pt reports that she is noticing improvements in strength of (L)UE  Objective: See treatment diary below    Assessment: Pt discomfort during most interventions today; however, reports (+) response to addition of MHP post Tx  Advised pt to perform heat + stretching at home and pending carryover of today's Tx,, pt would benefit from progression of exercise diary NV  Plan: Cont /c PT POC  Progress as tolerated  Precautions: Asthma; hearing loss (L) ear; hx TMJ; syncope; insomnia; (L) shoulder pain; depression    Date        Visit 1 2 3 4 5 6       FOTO IE            Re-Eval IE                Manuals        STM (L) UT, scalenes, C/S PS             (L) Hip Flex, ER MWM                                       Neuro Re-Ed        C/S Retractions nv 20x3"           Scap Retractions nv 20x5"           "Swimmers" Median N Glides nv  3x10          Tband Rows/Ext    GTB 20x3" ea GTB 20x3" ea        Hip Hike nv nv           SLS nv nv           March + Hip Hike             March + Suitcase Carry             TVA Contractions nv            TVA + March             TVA + SLR             Bird Dogs                           Ther Ex        Seated T/S Ext over ball nv 20x3"           Seated UT (S) nv 5x10"           Seated LS (S) nv 5x10"           Seated QL (S) nv 5x10" ea           Seated HS (S)  nv 4x20" ea           Corner Pec (S)             Standing Cane Ext nv            Functional IR (S) BTB             Iso Tband IR/ER nv   RTB 10x ea RTB 10x ea        Scaption             Piriformis (S)  (L) nv  10"x10 10x10" 10x10"        Supine pec (S) on 1/2 foam             Bridges nv  35x  30x 30x 30x       SL ER             SL Shoulder Flex             SL Shoulder Abd             Clams nv  20ea 20x ea 20x ea 30ea       SL Hip Abd             Open Books nv  20x L 10x5" (L) 10x5" (L) 10x5" (L)       Prone Y, T, I's                          Ther Activity 4/5 4/12 04/14 4/19 4/21 04/25       Recumbent bike nv 6' L0 6' L0 6' L0   6' L0       Pulleys nv 3' flex 3' 3' 3' 3'       Seated SWB Flex  nv 2' 3' 3' 3' 3'       Sidesteps             Monster Walks             Crossover Step Ups             Pt Edu 10' [place] AL [facility] AL                      Gait Training 4/5 4/12 04/14 4/19 04/25                                 Modalities 4/5 4/12 04/14 4/19 04/25       MHP (L) shldr/SCM      SP                        Kendrick San, PTA

## 2022-05-23 ENCOUNTER — OFFICE VISIT (OUTPATIENT)
Dept: AUDIOLOGY | Age: 27
End: 2022-05-23

## 2022-05-23 DIAGNOSIS — H90.3 SENSORY HEARING LOSS, BILATERAL: Primary | ICD-10-CM

## 2022-05-23 NOTE — PROGRESS NOTES
Hearing Aid Visit:    Name:  Viridiana Marquez  :  1995  Age:  27 y o  Date of Evaluation: 22     Viridiana Marquez is being seen for a hearing aid visit  Viridiana Marquez Patient is fit with [employer] Mini [facility] hearing aid(s)  Left serial OXRLUS [id number]  Warranty date: 24 (Loss/Damage and repair)    Dome/Earmold: 10DB       Patient reports no issues with sound quality  Patient noted improved sound quality  Patient noted a lot of itcy  Action:  Changed domes from 8 DB to 10 DB  Patient noted better fit and no itching  Recommendations: Follow up PRN        Estefania Davenport , CCC-A  Clinical Audiologist

## 2022-05-24 ENCOUNTER — APPOINTMENT (EMERGENCY)
Dept: CT IMAGING | Facility: HOSPITAL | Age: 27
End: 2022-05-24
Payer: COMMERCIAL

## 2022-05-24 ENCOUNTER — OFFICE VISIT (OUTPATIENT)
Dept: URGENT CARE | Age: 27
End: 2022-05-24
Payer: COMMERCIAL

## 2022-05-24 ENCOUNTER — HOSPITAL ENCOUNTER (EMERGENCY)
Facility: HOSPITAL | Age: 27
Discharge: HOME | End: 2022-05-24
Attending: EMERGENCY MEDICINE
Payer: COMMERCIAL

## 2022-05-24 VITALS
DIASTOLIC BLOOD PRESSURE: 56 MMHG | HEART RATE: 86 BPM | OXYGEN SATURATION: 99 % | WEIGHT: 213 LBS | SYSTOLIC BLOOD PRESSURE: 108 MMHG | RESPIRATION RATE: 20 BRPM | TEMPERATURE: 97.3 F | HEIGHT: 66 IN | BODY MASS INDEX: 34.23 KG/M2

## 2022-05-24 VITALS
HEART RATE: 90 BPM | SYSTOLIC BLOOD PRESSURE: 137 MMHG | TEMPERATURE: 99.5 F | DIASTOLIC BLOOD PRESSURE: 70 MMHG | RESPIRATION RATE: 16 BRPM | WEIGHT: 211.3 LBS | BODY MASS INDEX: 34.1 KG/M2 | OXYGEN SATURATION: 99 %

## 2022-05-24 DIAGNOSIS — U07.1 LAB TEST POSITIVE FOR DETECTION OF COVID-19 VIRUS: ICD-10-CM

## 2022-05-24 DIAGNOSIS — J02.9 PHARYNGITIS: Primary | ICD-10-CM

## 2022-05-24 DIAGNOSIS — J02.9 SORE THROAT: Primary | ICD-10-CM

## 2022-05-24 LAB
ALBUMIN SERPL BCP-MCNC: 4.5 G/DL (ref 3–5.2)
ALP SERPL-CCNC: 59 U/L (ref 43–122)
ALT SERPL W P-5'-P-CCNC: 24 U/L
ANION GAP SERPL CALCULATED.3IONS-SCNC: 9 MMOL/L (ref 5–14)
APTT PPP: 28 SECONDS (ref 23–37)
AST SERPL W P-5'-P-CCNC: 26 U/L (ref 14–36)
BACTERIA UR QL AUTO: ABNORMAL /HPF
BASOPHILS # BLD AUTO: 0.03 THOUSANDS/ΜL (ref 0–0.1)
BASOPHILS NFR BLD AUTO: 1 % (ref 0–1)
BILIRUB SERPL-MCNC: 0.43 MG/DL
BILIRUB UR QL STRIP: NEGATIVE
BUN SERPL-MCNC: 7 MG/DL (ref 5–25)
CALCIUM SERPL-MCNC: 9.2 MG/DL (ref 8.4–10.2)
CHLORIDE SERPL-SCNC: 105 MMOL/L (ref 97–108)
CLARITY UR: ABNORMAL
CO2 SERPL-SCNC: 25 MMOL/L (ref 22–30)
COLOR UR: YELLOW
CREAT SERPL-MCNC: 0.85 MG/DL (ref 0.6–1.2)
EOSINOPHIL # BLD AUTO: 0.03 THOUSAND/ΜL (ref 0–0.61)
EOSINOPHIL NFR BLD AUTO: 1 % (ref 0–6)
ERYTHROCYTE [DISTWIDTH] IN BLOOD BY AUTOMATED COUNT: 12.5 % (ref 11.6–15.1)
EXT PREG TEST URINE: NEGATIVE
EXT. CONTROL ED NAV: NORMAL
FLUAV RNA RESP QL NAA+PROBE: NEGATIVE
FLUBV RNA RESP QL NAA+PROBE: NEGATIVE
GFR SERPL CREATININE-BSD FRML MDRD: 93 ML/MIN/1.73SQ M
GLUCOSE SERPL-MCNC: 94 MG/DL (ref 70–99)
GLUCOSE UR STRIP-MCNC: NEGATIVE MG/DL
HCT VFR BLD AUTO: 44.3 % (ref 34.8–46.1)
HGB BLD-MCNC: 13.8 G/DL (ref 11.5–15.4)
HGB UR QL STRIP.AUTO: 150
IMM GRANULOCYTES # BLD AUTO: 0.02 THOUSAND/UL (ref 0–0.2)
IMM GRANULOCYTES NFR BLD AUTO: 0 % (ref 0–2)
INR PPP: 1.05 (ref 0.84–1.19)
KETONES UR STRIP-MCNC: NEGATIVE MG/DL
LEUKOCYTE ESTERASE UR QL STRIP: NEGATIVE
LYMPHOCYTES # BLD AUTO: 1.71 THOUSANDS/ΜL (ref 0.6–4.47)
LYMPHOCYTES NFR BLD AUTO: 37 % (ref 14–44)
MAGNESIUM SERPL-MCNC: 2.1 MG/DL (ref 1.6–2.3)
MCH RBC QN AUTO: 29.5 PG (ref 26.8–34.3)
MCHC RBC AUTO-ENTMCNC: 31.2 G/DL (ref 31.4–37.4)
MCV RBC AUTO: 95 FL (ref 82–98)
MONOCYTES # BLD AUTO: 0.7 THOUSAND/ΜL (ref 0.17–1.22)
MONOCYTES NFR BLD AUTO: 15 % (ref 4–12)
MUCOUS THREADS UR QL AUTO: ABNORMAL
NEUTROPHILS # BLD AUTO: 2.1 THOUSANDS/ΜL (ref 1.85–7.62)
NEUTS SEG NFR BLD AUTO: 46 % (ref 43–75)
NITRITE UR QL STRIP: NEGATIVE
NON-SQ EPI CELLS URNS QL MICRO: ABNORMAL /HPF
NRBC BLD AUTO-RTO: 0 /100 WBCS
PH UR STRIP.AUTO: 6 [PH]
PLATELET # BLD AUTO: 233 THOUSANDS/UL (ref 149–390)
PMV BLD AUTO: 10.8 FL (ref 8.9–12.7)
POTASSIUM SERPL-SCNC: 3.5 MMOL/L (ref 3.6–5)
PROT SERPL-MCNC: 8.4 G/DL (ref 5.9–8.4)
PROT UR STRIP-MCNC: NEGATIVE MG/DL
PROTHROMBIN TIME: 13.3 SECONDS (ref 11.6–14.5)
RBC # BLD AUTO: 4.68 MILLION/UL (ref 3.81–5.12)
RBC #/AREA URNS AUTO: ABNORMAL /HPF
RSV RNA RESP QL NAA+PROBE: NEGATIVE
S PYO AG THROAT QL: NEGATIVE
S PYO DNA THROAT QL NAA+PROBE: NOT DETECTED
SARS-COV-2 RNA RESP QL NAA+PROBE: POSITIVE
SODIUM SERPL-SCNC: 139 MMOL/L (ref 137–147)
SP GR UR STRIP.AUTO: 1.02 (ref 1–1.04)
UROBILINOGEN UA: 1 MG/DL
WBC # BLD AUTO: 4.59 THOUSAND/UL (ref 4.31–10.16)
WBC #/AREA URNS AUTO: ABNORMAL /HPF

## 2022-05-24 PROCEDURE — 99284 EMERGENCY DEPT VISIT MOD MDM: CPT

## 2022-05-24 PROCEDURE — 87880 STREP A ASSAY W/OPTIC: CPT | Performed by: PHYSICIAN ASSISTANT

## 2022-05-24 PROCEDURE — 99213 OFFICE O/P EST LOW 20 MIN: CPT | Performed by: PHYSICIAN ASSISTANT

## 2022-05-24 PROCEDURE — 0241U HB NFCT DS VIR RESP RNA 4 TRGT: CPT | Performed by: EMERGENCY MEDICINE

## 2022-05-24 PROCEDURE — 96375 TX/PRO/DX INJ NEW DRUG ADDON: CPT

## 2022-05-24 PROCEDURE — 87651 STREP A DNA AMP PROBE: CPT | Performed by: EMERGENCY MEDICINE

## 2022-05-24 PROCEDURE — 36415 COLL VENOUS BLD VENIPUNCTURE: CPT | Performed by: EMERGENCY MEDICINE

## 2022-05-24 PROCEDURE — 99285 EMERGENCY DEPT VISIT HI MDM: CPT | Performed by: EMERGENCY MEDICINE

## 2022-05-24 PROCEDURE — 86308 HETEROPHILE ANTIBODY SCREEN: CPT | Performed by: EMERGENCY MEDICINE

## 2022-05-24 PROCEDURE — 83735 ASSAY OF MAGNESIUM: CPT | Performed by: EMERGENCY MEDICINE

## 2022-05-24 PROCEDURE — 85730 THROMBOPLASTIN TIME PARTIAL: CPT | Performed by: EMERGENCY MEDICINE

## 2022-05-24 PROCEDURE — 85025 COMPLETE CBC W/AUTO DIFF WBC: CPT | Performed by: EMERGENCY MEDICINE

## 2022-05-24 PROCEDURE — 81025 URINE PREGNANCY TEST: CPT | Performed by: EMERGENCY MEDICINE

## 2022-05-24 PROCEDURE — G1004 CDSM NDSC: HCPCS

## 2022-05-24 PROCEDURE — 85610 PROTHROMBIN TIME: CPT | Performed by: EMERGENCY MEDICINE

## 2022-05-24 PROCEDURE — 96374 THER/PROPH/DIAG INJ IV PUSH: CPT

## 2022-05-24 PROCEDURE — 87070 CULTURE OTHR SPECIMN AEROBIC: CPT | Performed by: PHYSICIAN ASSISTANT

## 2022-05-24 PROCEDURE — 80053 COMPREHEN METABOLIC PANEL: CPT | Performed by: EMERGENCY MEDICINE

## 2022-05-24 PROCEDURE — 96361 HYDRATE IV INFUSION ADD-ON: CPT

## 2022-05-24 PROCEDURE — 70490 CT SOFT TISSUE NECK W/O DYE: CPT

## 2022-05-24 PROCEDURE — 81001 URINALYSIS AUTO W/SCOPE: CPT | Performed by: EMERGENCY MEDICINE

## 2022-05-24 RX ORDER — KETOROLAC TROMETHAMINE 30 MG/ML
30 INJECTION, SOLUTION INTRAMUSCULAR; INTRAVENOUS ONCE
Status: COMPLETED | OUTPATIENT
Start: 2022-05-24 | End: 2022-05-24

## 2022-05-24 RX ORDER — PREDNISONE 50 MG/1
50 TABLET ORAL DAILY
Qty: 4 TABLET | Refills: 0 | Status: SHIPPED | OUTPATIENT
Start: 2022-05-24 | End: 2022-05-28

## 2022-05-24 RX ORDER — DEXAMETHASONE SODIUM PHOSPHATE 10 MG/ML
10 INJECTION, SOLUTION INTRAMUSCULAR; INTRAVENOUS ONCE
Status: COMPLETED | OUTPATIENT
Start: 2022-05-24 | End: 2022-05-24

## 2022-05-24 RX ADMIN — DEXAMETHASONE SODIUM PHOSPHATE 10 MG: 10 INJECTION, SOLUTION INTRAMUSCULAR; INTRAVENOUS at 19:21

## 2022-05-24 RX ADMIN — KETOROLAC TROMETHAMINE 30 MG: 30 INJECTION, SOLUTION INTRAMUSCULAR; INTRAVENOUS at 20:42

## 2022-05-24 RX ADMIN — SODIUM CHLORIDE 1000 ML: 0.9 INJECTION, SOLUTION INTRAVENOUS at 19:22

## 2022-05-24 NOTE — ED PROVIDER NOTES
History  Chief Complaint   Patient presents with    Sore Throat     Yesterday started with a sore throat; has sinus congestion and headache and body aches  [facility] and nothing found so sent to ER  Patient is a 68-year-old female who was sent in from outside facility for rule out peritonsillar abscess  Patient states that she has normal state health until yesterday when she woke up with a sore throat  She states that her children and other friends noticed that her voice is changed  She has sore throat and difficulty swallowing solid foods  She states that she is tolerating liquids well  She is taking over-the-counter medications without relief  She has no difficulty sleeping  She has no recent surgeries or dental work to oral max still of region  No neck pain  No headache  No earache  She states she had some fevers last night  History provided by:  Patient  Sore Throat  Location:  Generalized  Quality:  Aching, sharp and sore  Severity:  Moderate  Onset quality:  Gradual  Timing:  Constant  Progression:  Worsening  Chronicity:  New  Relieved by:  Nothing  Worsened by:  Nothing  Ineffective treatments:  Acetaminophen and OTC medications  Associated symptoms: trouble swallowing and voice change    Associated symptoms: no abdominal pain, no adenopathy, no chest pain, no chills, no cough, no drooling, no ear discharge, no ear pain, no epistaxis, no eye discharge, no fever, no headaches, no neck stiffness, no night sweats, no plugged ear sensation, no postnasal drip, no rash, no rhinorrhea, no shortness of breath, no sinus congestion and no stridor    Risk factors: no exposure to strep, no exposure to mono, no sick contacts, no recent dental procedure, no recent endoscopy and no recent ENT procedure        Prior to Admission Medications   Prescriptions Last Dose Informant Patient Reported? Taking?    Myfembree 40-1-0 5 MG TABS   Yes No   Sig: Take 1 tablet by mouth daily   albuterol (Ventolin HFA) 90 mcg/act inhaler   No No   Sig: Inhale 2 puffs every 4 (four) hours as needed for wheezing   amitriptyline (ELAVIL) 25 mg tablet   No No   Sig: Take 2 tablets (50 mg total) by mouth daily at bedtime   cyclobenzaprine (FLEXERIL) 5 mg tablet   No No   Sig: Take 1 tablet (5 mg total) by mouth 3 (three) times a day as needed for muscle spasms   fluticasone (FLONASE) 50 mcg/act nasal spray   No No   Si spray into each nostril daily   fluticasone-salmeterol (Advair) 250-50 mcg/dose inhaler   No No   Sig: Inhale 1 puff 2 (two) times a day Rinse mouth after use    loratadine (CLARITIN) 10 mg tablet   No No   Sig: Take 1 tablet (10 mg total) by mouth daily   Patient not taking: Reported on 2022   montelukast (SINGULAIR) 10 mg tablet   No No   Sig: Take 1 tablet (10 mg total) by mouth daily at bedtime   Patient not taking: Reported on 2022   naproxen (Naprosyn) 500 mg tablet   No No   Sig: Take 1 tablet (500 mg total) by mouth 2 (two) times a day as needed for mild pain   oxybutynin (DITROPAN XL) 15 MG 24 hr tablet   Yes No   Sig: Take 15 mg by mouth daily   prochlorperazine (COMPAZINE) 10 mg tablet   No No   Sig: Take 1 tablet (10 mg total) by mouth every 8 (eight) hours as needed for nausea (migraine) Take with Decadron      Facility-Administered Medications: None       Past Medical History:   Diagnosis Date    Anemia     Asthma     Fibroids     Heart murmur        History reviewed  No pertinent surgical history  History reviewed  No pertinent family history  I have reviewed and agree with the history as documented      E-Cigarette/Vaping    E-Cigarette Use Never User      E-Cigarette/Vaping Substances    Nicotine No     THC No     CBD No     Flavoring No     Other No     Unknown No      Social History     Tobacco Use    Smoking status: Never Smoker    Smokeless tobacco: Never Used   Vaping Use    Vaping Use: Never used   Substance Use Topics    Alcohol use: Yes     Comment: rarely; about 3x a year    Drug use: Never       Review of Systems   Constitutional: Negative  Negative for chills, fever and night sweats  HENT: Positive for sore throat, trouble swallowing and voice change  Negative for drooling, ear discharge, ear pain, nosebleeds, postnasal drip and rhinorrhea  Eyes: Negative  Negative for pain, discharge and visual disturbance  Respiratory: Negative  Negative for cough, shortness of breath and stridor  Cardiovascular: Negative  Negative for chest pain and palpitations  Gastrointestinal: Negative  Negative for abdominal pain and vomiting  Genitourinary: Negative  Negative for dysuria and hematuria  Musculoskeletal: Negative  Negative for arthralgias, back pain and neck stiffness  Skin: Negative  Negative for color change and rash  Neurological: Negative  Negative for seizures, syncope and headaches  Hematological: Negative  Negative for adenopathy  Psychiatric/Behavioral: Negative  All other systems reviewed and are negative  Physical Exam  Physical Exam  Vitals and nursing note reviewed  Constitutional:       General: She is not in acute distress  Appearance: She is well-developed  HENT:      Head: Normocephalic and atraumatic  Right Ear: Hearing, tympanic membrane, ear canal and external ear normal       Left Ear: Hearing, tympanic membrane, ear canal and external ear normal       Mouth/Throat:      Mouth: Mucous membranes are moist       Pharynx: Uvula midline  Posterior oropharyngeal erythema present  No oropharyngeal exudate or uvula swelling  Tonsils: No tonsillar exudate or tonsillar abscesses  Comments: Patient maintaining airway and secretions  No stridor   No brawniness under tongue  Patient has postnasal drip as well as posterior pharyngeal erythema  There is no exudate noted  Uvula midline without edema  Patient states that her voice is changed      Eyes:      General: Lids are normal  Extraocular Movements: Extraocular movements intact  Conjunctiva/sclera: Conjunctivae normal       Pupils: Pupils are equal, round, and reactive to light  Neck:      Trachea: Trachea normal    Cardiovascular:      Rate and Rhythm: Normal rate and regular rhythm  Pulses:           Radial pulses are 2+ on the right side and 2+ on the left side  Heart sounds: No murmur heard  Pulmonary:      Effort: Pulmonary effort is normal  No respiratory distress  Breath sounds: Normal breath sounds  Abdominal:      Palpations: Abdomen is soft  Tenderness: There is no abdominal tenderness  Musculoskeletal:      Cervical back: Neck supple  Lymphadenopathy:      Cervical: Cervical adenopathy present  Right cervical: Superficial cervical adenopathy present  Left cervical: Superficial cervical adenopathy present  Skin:     General: Skin is warm and dry  Capillary Refill: Capillary refill takes less than 2 seconds  Neurological:      General: No focal deficit present  Mental Status: She is alert and oriented to person, place, and time  GCS: GCS eye subscore is 4  GCS verbal subscore is 5  GCS motor subscore is 6  Cranial Nerves: Cranial nerves are intact  Sensory: Sensation is intact  Motor: Motor function is intact  Coordination: Coordination is intact  Psychiatric:         Mood and Affect: Mood normal          Behavior: Behavior normal  Behavior is cooperative           Vital Signs  ED Triage Vitals [05/24/22 1854]   Temperature Pulse Respirations Blood Pressure SpO2   99 5 °F (37 5 °C) 90 16 137/70 99 %      Temp Source Heart Rate Source Patient Position - Orthostatic VS BP Location FiO2 (%)   Oral Monitor Sitting Left arm --      Pain Score       7           Vitals:    05/24/22 1854   BP: 137/70   Pulse: 90   Patient Position - Orthostatic VS: Sitting         Visual Acuity      ED Medications  Medications   sodium chloride 0 9 % bolus 1,000 mL (0 mL Intravenous Stopped 5/24/22 2041)   dexamethasone (PF) (DECADRON) injection 10 mg (10 mg Intravenous Given 5/24/22 1921)   ketorolac (TORADOL) injection 30 mg (30 mg Intravenous Given 5/24/22 2042)       Diagnostic Studies  Results Reviewed     Procedure Component Value Units Date/Time    Magnesium [[id number]]  (Normal) Collected: 05/24/22 2034    Lab Status: Final result Specimen: Blood from Arm, Right Updated: 05/24/22 2116     Magnesium 2 1 mg/dL     Comprehensive metabolic panel [[id number]]  (Abnormal) Collected: 05/24/22 2034    Lab Status: Final result Specimen: Blood from Arm, Right Updated: 05/24/22 2116     Sodium 139 mmol/L      Potassium 3 5 mmol/L      Chloride 105 mmol/L      CO2 25 mmol/L      ANION GAP 9 mmol/L      BUN 7 mg/dL      Creatinine 0 85 mg/dL      Glucose 94 mg/dL      Calcium 9 2 mg/dL      AST 26 U/L      ALT 24 U/L      Alkaline Phosphatase 59 U/L      Total Protein 8 4 g/dL      Albumin 4 5 g/dL      Total Bilirubin 0 43 mg/dL      eGFR 93 ml/min/1 73sq m     Narrative:      [facility] guidelines for Chronic Kidney Disease (CKD):     Stage 1 with normal or high GFR (GFR > 90 mL/min/1 73 square meters)    Stage 2 Mild CKD (GFR = 60-89 mL/min/1 73 square meters)    Stage 3A Moderate CKD (GFR = 45-59 mL/min/1 73 square meters)    Stage 3B Moderate CKD (GFR = 30-44 mL/min/1 73 square meters)    Stage 4 Severe CKD (GFR = 15-29 mL/min/1 73 square meters)    Stage 5 End Stage CKD (GFR <15 mL/min/1 73 square meters)  Note: GFR calculation is accurate only with a steady state creatinine    Mononucleosis screen [[id number]] Collected: 05/24/22 2034    Lab Status:  In process Specimen: Blood from Arm, Right Updated: 05/24/22 2047    COVID/FLU/RSV - 2 hour TAT [[id number]]  (Abnormal) Collected: 05/24/22 1922    Lab Status: Final result Specimen: Nasopharyngeal Swab Updated: 05/24/22 2024     SARS-CoV-2 Positive     INFLUENZA A PCR Negative     INFLUENZA B PCR Negative RSV PCR Negative    Narrative:      FOR PEDIATRIC PATIENTS - copy/paste COVID Guidelines URL to browser: [email or web address]  [email or web address]    SARS-CoV-2 assay is a Nucleic Acid Amplification assay intended for the  qualitative detection of nucleic acid from SARS-CoV-2 in nasopharyngeal  swabs  Results are for the presumptive identification of SARS-CoV-2 RNA  Positive results are indicative of infection with SARS-CoV-2, the virus  causing COVID-19, but do not rule out bacterial infection or co-infection  with other viruses  Laboratories within the [place] and its  territories are required to report all positive results to the appropriate  public health authorities  Negative results do not preclude SARS-CoV-2  infection and should not be used as the sole basis for treatment or other  patient management decisions  Negative results must be combined with  clinical observations, patient history, and epidemiological information  This test has not been FDA cleared or approved  This test has been authorized by FDA under an Emergency Use Authorization  (EUA)  This test is only authorized for the duration of time the  declaration that circumstances exist justifying the authorization of the  emergency use of an in vitro diagnostic tests for detection of SARS-CoV-2  virus and/or diagnosis of COVID-19 infection under section 564(b)(1) of  the Act, 21 U  S C  271SLN-4(R)(7), unless the authorization is terminated  or revoked sooner  The test has been validated but independent review by FDA  and CLIA is pending  Test performed using [employer]pert: This RT-PCR assay targets N2,  a region unique to SARS-CoV-2  A conserved region in the E-gene was chosen  for pan-Sarbecovirus detection which includes SARS-CoV-2      Strep A PCR [[id number]]  (Normal) Collected: 05/24/22 1922    Lab Status: Final result Specimen: Throat Updated: 05/24/22 2010     STREP A PCR Not Detected Urine Microscopic [[id number]]  (Abnormal) Collected: 05/24/22 1931    Lab Status: Final result Specimen: Urine, Clean Catch Updated: 05/24/22 2010     RBC, UA 4-10 /hpf      WBC, UA 0-1 /hpf      Epithelial Cells Moderate /hpf      Bacteria, UA Moderate /hpf      MUCUS THREADS Occasional    Protime-INR [[id number]]  (Normal) Collected: 05/24/22 1922    Lab Status: Final result Specimen: Blood from Arm, Right Updated: 05/24/22 1955     Protime 13 3 seconds      INR 1 05    APTT [[id number]]  (Normal) Collected: 05/24/22 1922    Lab Status: Final result Specimen: Blood from Arm, Right Updated: 05/24/22 1955     PTT 28 seconds     UA (URINE) with reflex to Scope [[id number]]  (Abnormal) Collected: 05/24/22 1931    Lab Status: Final result Specimen: Urine, Clean Catch Updated: 05/24/22 1954     Color, UA Yellow     Clarity, UA Slightly Cloudy     Specific [place], UA 1 020     pH, UA 6 0     Leukocytes, UA Negative     Nitrite, UA Negative     Protein, UA Negative mg/dl      Glucose, UA Negative mg/dl      Ketones, UA Negative mg/dl      Bilirubin, UA Negative     Blood,  0     UROBILINOGEN UA 1 0 mg/dL     CBC and differential [[id number]]  (Abnormal) Collected: 05/24/22 1922    Lab Status: Final result Specimen: Blood from Arm, Right Updated: 05/24/22 1940     WBC 4 59 Thousand/uL      RBC 4 68 Million/uL      Hemoglobin 13 8 g/dL      Hematocrit 44 3 %      MCV 95 fL      MCH 29 5 pg      MCHC 31 2 g/dL      RDW 12 5 %      MPV 10 8 fL      Platelets 315 Thousands/uL      nRBC 0 /100 WBCs      Neutrophils Relative 46 %      Immat GRANS % 0 %      Lymphocytes Relative 37 %      Monocytes Relative 15 %      Eosinophils Relative 1 %      Basophils Relative 1 %      Neutrophils Absolute 2 10 Thousands/µL      Immature Grans Absolute 0 02 Thousand/uL      Lymphocytes Absolute 1 71 Thousands/µL      Monocytes Absolute 0 70 Thousand/µL      Eosinophils Absolute 0 03 Thousand/µL      Basophils Absolute 0 03 Thousands/µL POCT pregnancy, urine [[id number]]  (Normal) Resulted: 05/24/22 1934    Lab Status: Final result Updated: 05/24/22 1934     EXT PREG TEST UR (Ref: Negative) negative     Control valid                 CT soft tissue neck wo contrast   Final Result by Georgiana Gilford, MD (05/24 2131)      No evidence of pharyngitis, abscess or lymphadenopathy  Workstation performed: [id number]                    Procedures  Procedures         ED Course  ED Course as of 05/24/22 2220   Tue May 24, 2022   1906 Patientis a 27year old female sent in by  for rule out PTA  Patient with onset of worsening sore throat and difficulty eating  On exam, patient maintaining airway and secretions  There is posterior pharyngeal erythema with mild swelling on the right  Uvula midline  Will reach out to radiology for approval for CT IV contrast rule out peritonsillar abscess  She had a strep test today that was negative  Will test COVID, flu, RSV as well as labs CT with or without as well as mono      Portions of the record may have been created with voice recognition software  Occasional wrong word or "sound a like" substitutions may have occurred due to the inherent limitations of voice recognition software  Read the chart carefully and recognize, using context, where substitutions have occurred  1108 Dr Montemayor Pi from Radiology and I discussed  case - will CT without given shortage of IV nationally   1957 Patient's labs are stable with no evidence of end-organ damage  Pregnancy negative  Pending COVID and strep  Patient's labs need to be drawn however given no IV contrast is clear for CT   2026 Patient is COVID positive  Patient resting in bed maintaining airway and secretions no stridor  Patient was given Decadron  Pending repeat medications as well as CT  Patient was updated on this   2117 Patient's CMP magnesium stable  Pending CT   2134 CT without acute pathology    Patient tolerating her own liquids in the room   COVID positive  2137 Patient resting in bed  Maintaining airway and secretions  Long discussion with patient regarding final attack for 24-48 hours to resolve  restrictions as well   will give continue dose of steroids and DC home  CT without acute pathology  No antibiotics indicated at this time  Will DC home  Patient with return to ER instructions given  SBIRT 20yo+    Flowsheet Row Most Recent Value   SBIRT (23 yo +)    In order to provide better care to our patients, we are screening all of our patients for alcohol and drug use  Would it be okay to ask you these screening questions? No Filed at: 05/24/2022 1907                    MDM  Number of Diagnoses or Management Options  Diagnosis management comments:     Differential includes but not limited to:  Viral pharyngitis, mono, retropharyngeal abscess, peritonsillar abscess, strep pharyngitis, Sunday's angina, other bacterial causes such as but not limited to (Neisseria, pertussis, Chlamydia, mycoplasma, syphilis); neoplasm, GERD, allergies, chemical injury, fungal infection, mastoiditis, sinusitis, hand-foot-mouth disease  Jaswinder Rene              Amount and/or Complexity of Data Reviewed  Clinical lab tests: ordered and reviewed  Tests in the radiology section of CPT®: reviewed and ordered  Tests in the medicine section of CPT®: reviewed and ordered  Review and summarize past medical records: yes  Independent visualization of images, tracings, or specimens: yes        Disposition  Final diagnoses:   Pharyngitis   Lab test positive for detection of COVID-19 virus     Time reflects when diagnosis was documented in both MDM as applicable and the Disposition within this note     Time User Action Codes Description Comment    5/24/2022  7:38 PM Patricia Watters Add [J02 9] Pharyngitis     5/24/2022  8:34 PM Gerardo Martin Add [U07 1] Lab test positive for detection of COVID-19 virus       ED Disposition     ED Disposition Discharge    Condition   Stable    Date/Time   Tue May 24, 2022  9:39 PM    Comment   Rafael Fletcher discharge to home/self care                 Follow-up Information     Follow up With Specialties Details Why Contact Info Additional [address] [facility] Schedule an appointment as soon as possible for a visit in 1 week  [address], [address] [facility] [id number]  [address] [facility] [facility] [address], [address], [address], [address] [place] [address]          Discharge Medication List as of 5/24/2022  9:40 PM      CONTINUE these medications which have NOT CHANGED    Details   albuterol (Ventolin HFA) 90 mcg/act inhaler Inhale 2 puffs every 4 (four) hours as needed for wheezing, Starting Wed 12/15/2021, Normal      amitriptyline (ELAVIL) 25 mg tablet Take 2 tablets (50 mg total) by mouth daily at bedtime, Starting Thu 7/8/2021, Normal      cyclobenzaprine (FLEXERIL) 5 mg tablet Take 1 tablet (5 mg total) by mouth 3 (three) times a day as needed for muscle spasms, Starting Thu 3/17/2022, Normal      fluticasone (FLONASE) 50 mcg/act nasal spray 1 spray into each nostril daily, Starting Wed 12/15/2021, Normal      fluticasone-salmeterol (Advair) 250-50 mcg/dose inhaler Inhale 1 puff 2 (two) times a day Rinse mouth after use , Starting Wed 9/15/2021, Normal      loratadine (CLARITIN) 10 mg tablet Take 1 tablet (10 mg total) by mouth daily, Starting Wed 12/15/2021, Normal      montelukast (SINGULAIR) 10 mg tablet Take 1 tablet (10 mg total) by mouth daily at bedtime, Starting Wed 12/15/2021, Normal      Myfembree 40-1-0 5 MG TABS Take 1 tablet by mouth daily, Starting Tue 8/17/2021, Historical Med      naproxen (Naprosyn) 500 mg tablet Take 1 tablet (500 mg total) by mouth 2 (two) times a day as needed for mild pain, Starting Thu 3/17/2022, Normal      oxybutynin (DITROPAN XL) 15 MG 24 hr tablet Take 15 mg by mouth daily, Starting Mon 9/28/2020, Historical Med      prochlorperazine (COMPAZINE) 10 mg tablet Take 1 tablet (10 mg total) by mouth every 8 (eight) hours as needed for nausea (migraine) Take with Decadron, Starting Thu 7/8/2021, Normal             No discharge procedures on file      PDMP Review     None          ED Provider  Electronically Signed by           Scott Schneider DO  05/24/22 8044

## 2022-05-24 NOTE — PROGRESS NOTES
[facility]        NAME: Anuradha Carrillo is a 27 y o  female  : 1995    MRN: [id number]  DATE: May 24, 2022  TIME: 5:14 PM    Assessment and Plan   Sore throat [J02 9]  1  Sore throat     Pt presents with complaints of sore throat, history of recurrent tonsillitis and strep  Rapid strep in clinic is negative, will send for culture  Pt has symptoms concerning for possible paulette-tonsillar, tonsillar, and retropharyngeal abscess reporting voice changes, trouble swallowing, and pain in the jaw and neck  Recommend further evaluation with possible imaging of the neck  Pt directed to the emergency department  Pt has requested transfer to [address] [place] [address]  Referral order placed  Patient Instructions   Proceed to [facility] [facility] Emergency Department     Chief Complaint     Chief Complaint   Patient presents with    Sore Throat     Pt c/o sinus congestion, swollen tonsils, light headed, dizzy, fever beginning yesterday  Pt did home and pcr covid test on  and both were negative  History of Present Illness       27year old female presents with complaint of     Sore Throat   This is a recurrent (pt reports several episodes of strep and tonsilitis in the past and states that she had been told she should undergo a tonsillectomy ) problem  Episode onset: 2 days ago  The problem has been gradually worsening  The pain is worse on the left side  The maximum temperature recorded prior to her arrival was 102 - 102 9 F (102 yesterday)  The pain is at a severity of 6/10  The pain is moderate  Associated symptoms include swollen glands and trouble swallowing (pt reports that she had a [place] valero get stuck earlier, but has been tolerating liquids just fine)  Pertinent negatives include no congestion, coughing, diarrhea, drooling, ear pain, headaches, shortness of breath, stridor or vomiting   Hoarse voice: pt reports she feels her voice is deepter than usual  She has had no exposure to strep  She has tried cool liquids for the symptoms  The treatment provided no relief  Review of Systems   Review of Systems   HENT: Positive for sore throat, trouble swallowing (pt reports that she had a french valero get stuck earlier, but has been tolerating liquids just fine) and voice change (pt reports deepening of her voice)  Negative for congestion, drooling and ear pain  Hoarse voice: pt reports she feels her voice is deepter than usual     Respiratory: Negative for cough, shortness of breath and stridor  Cardiovascular: Negative for chest pain  Gastrointestinal: Negative for diarrhea, nausea and vomiting  Neurological: Negative for headaches           Current Medications       Current Outpatient Medications:     albuterol (Ventolin HFA) 90 mcg/act inhaler, Inhale 2 puffs every 4 (four) hours as needed for wheezing, Disp: 18 g, Rfl: 1    amitriptyline (ELAVIL) 25 mg tablet, Take 2 tablets (50 mg total) by mouth daily at bedtime, Disp: 60 tablet, Rfl: 5    cyclobenzaprine (FLEXERIL) 5 mg tablet, Take 1 tablet (5 mg total) by mouth 3 (three) times a day as needed for muscle spasms, Disp: 30 tablet, Rfl: 0    fluticasone (FLONASE) 50 mcg/act nasal spray, 1 spray into each nostril daily, Disp: 16 g, Rfl: 2    fluticasone-salmeterol (Advair) 250-50 mcg/dose inhaler, Inhale 1 puff 2 (two) times a day Rinse mouth after use , Disp: 60 blister, Rfl: 1    Myfembree 40-1-0 5 MG TABS, Take 1 tablet by mouth daily, Disp: , Rfl:     naproxen (Naprosyn) 500 mg tablet, Take 1 tablet (500 mg total) by mouth 2 (two) times a day as needed for mild pain, Disp: 30 tablet, Rfl: 0    oxybutynin (DITROPAN XL) 15 MG 24 hr tablet, Take 15 mg by mouth daily, Disp: , Rfl:     loratadine (CLARITIN) 10 mg tablet, Take 1 tablet (10 mg total) by mouth daily (Patient not taking: Reported on 5/24/2022), Disp: 30 tablet, Rfl: 1    montelukast (SINGULAIR) 10 mg tablet, Take 1 tablet (10 mg total) by mouth daily at bedtime (Patient not taking: Reported on 5/24/2022), Disp: 90 tablet, Rfl: 3    prochlorperazine (COMPAZINE) 10 mg tablet, Take 1 tablet (10 mg total) by mouth every 8 (eight) hours as needed for nausea (migraine) Take with Decadron, Disp: 14 tablet, Rfl: 0    Current Allergies     Allergies as of 05/24/2022 - Reviewed 05/24/2022   Allergen Reaction Noted    Eggs or egg-derived products - food allergy Anaphylaxis 06/06/2018    Peanut oil - food allergy Anaphylaxis 06/06/2018    Baking soda-fluoride [sodium fluoride] Swelling 08/10/2018    Benzocaine Swelling 06/06/2018    Aloe vera Hives, Itching, and Rash 04/25/2019            The following portions of the patient's history were reviewed and updated as appropriate: allergies, current medications, past family history, past medical history, past social history, past surgical history and problem list      Past Medical History:   Diagnosis Date    Anemia     Asthma     Fibroids     Heart murmur        History reviewed  No pertinent surgical history  No family history on file  Medications have been verified  Objective   /56   Pulse 86   Temp (!) 97 3 °F (36 3 °C)   Resp 20   Ht 5' 6" (1 676 m)   Wt 96 6 kg (213 lb)   SpO2 99%   BMI 34 38 kg/m²   No LMP recorded  Physical Exam     Physical Exam  Vitals and nursing note reviewed  Constitutional:       General: She is awake  She is not in acute distress  Appearance: Normal appearance  She is well-developed and well-groomed  She is not ill-appearing, toxic-appearing or diaphoretic  HENT:      Head: Normocephalic and atraumatic  Right Ear: Hearing, tympanic membrane, ear canal and external ear normal       Left Ear: Hearing, tympanic membrane, ear canal and external ear normal       Nose: Mucosal edema present  No nasal deformity, congestion or rhinorrhea  Right Turbinates: Not enlarged, swollen or pale  Left Turbinates: Not enlarged, swollen or pale  Mouth/Throat:      Lips: Pink  No lesions  Pharynx: Uvula midline  Pharyngeal swelling and posterior oropharyngeal erythema present  No oropharyngeal exudate or uvula swelling  Tonsils: Tonsillar exudate (right side) present  No tonsillar abscesses  2+ on the right  2+ on the left  Eyes:      General: Lids are normal  Vision grossly intact  Gaze aligned appropriately  Neck:      Trachea: Phonation normal  Tracheal tenderness present  No tracheal deviation  Cardiovascular:      Rate and Rhythm: Normal rate  Pulmonary:      Effort: Pulmonary effort is normal       Comments: Patient is speaking in full sentences with no increased respiratory effort  No audible wheezing or stridor  Musculoskeletal:      Cervical back: Normal range of motion  Lymphadenopathy:      Cervical: Cervical adenopathy present  Right cervical: Superficial cervical adenopathy present  No deep or posterior cervical adenopathy  Left cervical: Superficial cervical adenopathy present  No deep or posterior cervical adenopathy  Skin:     General: Skin is warm and dry  Neurological:      Mental Status: She is alert and oriented to person, place, and time  Coordination: Coordination is intact  Gait: Gait is intact  Psychiatric:         Attention and Perception: Attention and perception normal          Mood and Affect: Mood and affect normal          Speech: Speech normal          Behavior: Behavior normal  Behavior is cooperative  Note: Portions of this record may have been created with voice recognition software  Occasional wrong word or "sound a like" substitutions may have occurred due to the inherent limitations of voice recognition software  Please read the chart carefully and recognize, using context, where substitutions have occurred  *

## 2022-05-25 LAB — HETEROPH AB SER QL: NEGATIVE

## 2022-05-25 NOTE — DISCHARGE INSTRUCTIONS
ALTERNATE TYLENOL & MOTRIN FOR PAIN CONTROL    ANY WORSENING SYMPTOMS CALL 911 OR RETURN TO ER    URINE MONO SCREEN WILL BE RESULTED AND FROM 24-48 HOURS

## 2022-05-25 NOTE — ED NOTES
2nd draw from blood work, noted smooth flowing IV, pt tolerated well MD sushant Arnold, RN  05/24/22 2017

## 2022-05-26 LAB — BACTERIA THROAT CULT: NORMAL

## 2022-06-24 ENCOUNTER — TELEPHONE (OUTPATIENT)
Dept: NEUROLOGY | Facility: CLINIC | Age: 27
End: 2022-06-24

## 2022-06-27 ENCOUNTER — OFFICE VISIT (OUTPATIENT)
Dept: FAMILY MEDICINE CLINIC | Facility: CLINIC | Age: 27
End: 2022-06-27

## 2022-06-27 VITALS
TEMPERATURE: 97.7 F | RESPIRATION RATE: 19 BRPM | HEART RATE: 85 BPM | DIASTOLIC BLOOD PRESSURE: 64 MMHG | OXYGEN SATURATION: 98 % | SYSTOLIC BLOOD PRESSURE: 102 MMHG | HEIGHT: 66 IN | BODY MASS INDEX: 33.75 KG/M2 | WEIGHT: 210 LBS

## 2022-06-27 DIAGNOSIS — F32.1 CURRENT MODERATE EPISODE OF MAJOR DEPRESSIVE DISORDER WITHOUT PRIOR EPISODE (HCC): Primary | ICD-10-CM

## 2022-06-27 DIAGNOSIS — J45.20 MILD INTERMITTENT ASTHMA WITHOUT COMPLICATION: ICD-10-CM

## 2022-06-27 PROCEDURE — 99213 OFFICE O/P EST LOW 20 MIN: CPT | Performed by: INTERNAL MEDICINE

## 2022-06-27 RX ORDER — ALBUTEROL SULFATE 90 UG/1
2 AEROSOL, METERED RESPIRATORY (INHALATION) EVERY 4 HOURS PRN
Qty: 18 G | Refills: 1 | Status: SHIPPED | OUTPATIENT
Start: 2022-06-27
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
## 2022-06-27 NOTE — TELEPHONE ENCOUNTER
Patient needs refills on albuterol (Ventolin HFA) 90 mcg/act inhaler  She forgot to ask for refills at time of visit
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
## 2022-07-08 ENCOUNTER — OFFICE VISIT (OUTPATIENT)
Dept: NEUROLOGY | Facility: CLINIC | Age: 27
End: 2022-07-08
Payer: COMMERCIAL

## 2022-07-08 ENCOUNTER — TELEPHONE (OUTPATIENT)
Dept: FAMILY MEDICINE CLINIC | Facility: CLINIC | Age: 27
End: 2022-07-08

## 2022-07-08 VITALS
RESPIRATION RATE: 18 BRPM | BODY MASS INDEX: 33.4 KG/M2 | SYSTOLIC BLOOD PRESSURE: 112 MMHG | WEIGHT: 212.8 LBS | HEIGHT: 67 IN | DIASTOLIC BLOOD PRESSURE: 57 MMHG | HEART RATE: 73 BPM | TEMPERATURE: 97.1 F

## 2022-07-08 DIAGNOSIS — G43.709 CHRONIC MIGRAINE WITHOUT AURA WITHOUT STATUS MIGRAINOSUS, NOT INTRACTABLE: Primary | ICD-10-CM

## 2022-07-08 DIAGNOSIS — R51.9 NONINTRACTABLE HEADACHE, UNSPECIFIED CHRONICITY PATTERN, UNSPECIFIED HEADACHE TYPE: ICD-10-CM

## 2022-07-08 DIAGNOSIS — R29.898 WEAKNESS OF LEFT UPPER EXTREMITY: ICD-10-CM

## 2022-07-08 DIAGNOSIS — R29.898 MOTOR PROBLEMS WITH LIMBS: ICD-10-CM

## 2022-07-08 DIAGNOSIS — R51.9 NONINTRACTABLE HEADACHE, UNSPECIFIED CHRONICITY PATTERN, UNSPECIFIED HEADACHE TYPE: Primary | ICD-10-CM

## 2022-07-08 PROCEDURE — 99214 OFFICE O/P EST MOD 30 MIN: CPT | Performed by: PHYSICIAN ASSISTANT

## 2022-07-08 RX ORDER — KETOROLAC TROMETHAMINE 10 MG/1
TABLET, FILM COATED ORAL
Qty: 10 TABLET | Refills: 1 | Status: SHIPPED | OUTPATIENT
Start: 2022-07-08 | End: 2022-10-07

## 2022-07-08 NOTE — PATIENT INSTRUCTIONS
Start supplements for migraine prevention, as outlined on the supplement list  I sent in Rx for toradol 10mg to use at onset of migraine if needed  Will hold off on re-starting amitriptyline until you see psychiatry    Once they evaluate and decide on a medication to prescribe for mood, please let me know so I can make the best decision on your migraine preventative medication  Make sure you are staying well hydrated, eating regularly/not skipping meals, and getting adequate sleep

## 2022-07-08 NOTE — TELEPHONE ENCOUNTER
Memory Setting from [facility] neurology contacted office requesting Ambulatory referral for pt  appt is today at 9:15 am, She stated she send a message to clinical pool  Please advice         Dx; [phone] [phone]
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
## 2022-07-08 NOTE — ASSESSMENT & PLAN NOTE
Patient previously on amitriptyline 50mg HS for headaches and sleep, but has not taken in 6 months due to she ran out of medication (she never requested a refill from us)  She notes migraines have increased in frequency  She usually just lets them pass by lying in a dark, quiet room and sleeping, does not take anything for them  She has an upcoming intake with a psychiatrist and anticipates they will be starting her on something for anxiety and depression  I would like to hold off on re-starting amitriptyline (although it did help migraines), until we know what psychiatry is going to Rx for her  She is going to call me when the Rx something for her mood  In the meantime, will start magnesium oxide, B2 and coQ10 for migraine prevention  I am also going to send in toradol for abortive therapy in case she wants to use that  We also discussed lifestyle factors that contribute to migraines  Plan:   For migraine preventative therapy-  -hold off on Rx preventative until we know what psychiatry will be prescribing for mood  -start magnesium oxide 200-550 mg daily and if tolerated increase to 400-500 mg daily (discussed potential GI side effects)  -start B2 (riboflavin) 400 mg daily  -start Co Q10 100 mg b i d   -discussed maintaining adequate hydration, eating regularly/not skipping meals and getting plenty of sleep    For migraine abortive therapy-  -sent prescription for oral Toradol 10 mg to be taken at onset of migraine if needed  -limit the use of any OTC analgesics to no more than 2-3 times per week to prevent rebound headache
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
## 2022-08-01 ENCOUNTER — CLINICAL SUPPORT (OUTPATIENT)
Dept: FAMILY MEDICINE CLINIC | Facility: CLINIC | Age: 27
End: 2022-08-01

## 2022-08-01 DIAGNOSIS — Z11.1 ENCOUNTER FOR PPD TEST: Primary | ICD-10-CM

## 2022-08-01 PROCEDURE — 86580 TB INTRADERMAL TEST: CPT | Performed by: FAMILY MEDICINE

## 2022-08-03 ENCOUNTER — CLINICAL SUPPORT (OUTPATIENT)
Dept: FAMILY MEDICINE CLINIC | Facility: CLINIC | Age: 27
End: 2022-08-03

## 2022-08-03 DIAGNOSIS — Z11.1 ENCOUNTER FOR PPD SKIN TEST READING: Primary | ICD-10-CM

## 2022-08-03 LAB
INDURATION: 0 MM
TB SKIN TEST: NEGATIVE

## 2022-08-25 ENCOUNTER — OFFICE VISIT (OUTPATIENT)
Dept: DENTISTRY | Facility: CLINIC | Age: 27
End: 2022-08-25

## 2022-08-25 VITALS — TEMPERATURE: 98 F | HEART RATE: 65 BPM | DIASTOLIC BLOOD PRESSURE: 70 MMHG | SYSTOLIC BLOOD PRESSURE: 97 MMHG

## 2022-08-25 DIAGNOSIS — K03.6 ACCRETIONS ON TEETH: ICD-10-CM

## 2022-08-25 DIAGNOSIS — Z01.20 ENCOUNTER FOR DENTAL EXAMINATION: Primary | ICD-10-CM

## 2022-08-25 DIAGNOSIS — K03.6 DENTAL CALCULUS: ICD-10-CM

## 2022-08-25 PROBLEM — K05.30 PERIODONTITIS: Status: ACTIVE | Noted: 2022-08-25

## 2022-08-25 PROCEDURE — D1110 PROPHYLAXIS - ADULT: HCPCS

## 2022-08-25 PROCEDURE — D0120 PERIODIC ORAL EVALUATION - ESTABLISHED PATIENT: HCPCS
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
## 2022-09-01 ENCOUNTER — OFFICE VISIT (OUTPATIENT)
Dept: DENTISTRY | Facility: CLINIC | Age: 27
End: 2022-09-01

## 2022-09-01 VITALS — HEART RATE: 96 BPM | DIASTOLIC BLOOD PRESSURE: 76 MMHG | SYSTOLIC BLOOD PRESSURE: 120 MMHG | TEMPERATURE: 98.4 F

## 2022-09-01 DIAGNOSIS — K02.9 DENTAL CARIES: Primary | ICD-10-CM

## 2022-09-01 PROCEDURE — D2392 RESIN-BASED COMPOSITE - 2 SURFACES, POSTERIOR: HCPCS | Performed by: DENTIST

## 2022-09-01 NOTE — DENTAL PROCEDURE DETAILS
NOTE: Patient is allergic to benzocaine, known reaction is facial swelling  Due for next hygiene recall Feb 2023  [facility], [facility], ASA 2 - Patient with mild systemic disease with no functional limitations  Patient reports pain level of 0  Patient presents for restorative treatment #3-OL  EOE WNL  IOE shows no swelling or sinus tracts  Anesthesia: None  Isolation: Cotton roll isolation achieved  Tx:  Primary caries removed  No matrix used  Selective etched for 12 seconds with 37% phosphoric acid and rinsed, Ivoclar Adhese Universal bond placed with VivaPen 20 second scrub, air dried for 5 seconds and light cured,, and restored with Tetric Evoflow and Evoceram composite shade A2  Occlusion checked with articulation paper and Margins checked with explorer  Adjusted as needed  Finished and polished  Patient satisfied and dismissed alert and ambulatory  Behavior ++, very good for injection      NV: 6 Month Recall

## 2022-10-07 ENCOUNTER — OFFICE VISIT (OUTPATIENT)
Dept: FAMILY MEDICINE CLINIC | Facility: CLINIC | Age: 27
End: 2022-10-07

## 2022-10-07 VITALS
HEART RATE: 74 BPM | WEIGHT: 219 LBS | SYSTOLIC BLOOD PRESSURE: 114 MMHG | BODY MASS INDEX: 34.37 KG/M2 | OXYGEN SATURATION: 98 % | DIASTOLIC BLOOD PRESSURE: 68 MMHG | TEMPERATURE: 98.6 F | HEIGHT: 67 IN | RESPIRATION RATE: 18 BRPM

## 2022-10-07 DIAGNOSIS — R29.898 WEAKNESS OF LEFT UPPER EXTREMITY: Primary | ICD-10-CM

## 2022-10-07 DIAGNOSIS — F32.1 CURRENT MODERATE EPISODE OF MAJOR DEPRESSIVE DISORDER WITHOUT PRIOR EPISODE (HCC): ICD-10-CM

## 2022-10-07 PROCEDURE — 99213 OFFICE O/P EST LOW 20 MIN: CPT | Performed by: FAMILY MEDICINE

## 2022-10-09 NOTE — ASSESSMENT & PLAN NOTE
Patient continues to have depression, she also reports insomnia  PHQ 9 = 18     PLAN:   - continue with meditation   - Sleep hygiene education was preformed   - Will do PHQ9 repeat at next visit, if still elevated will strongly recommend antidepressants   - Gave patient strict ED precautions if patient has any suicidal ideations or intentions

## 2022-10-09 NOTE — ASSESSMENT & PLAN NOTE
She continues to have left-sided neck pain and left arm weakness  Patient has been following with pain management, neurology and chiropractor  MRI was negative for any spinal cord injury  She did have minimal bulging at C4-C5 and C5-C6  EMG however shows evidence of asymmetry in the distal motor latencies for the long thoracic nerve indicative of brachial plexus involvement on the left side  She did have left scapular winging on exam by pain management on 01/08/2021 which could represent left brachial plexus injury  Has completed phyiscal therapy without much improvement       PLAN:   - Will do Functional Capacity Evaluation   - Will do social work referral to help with disability claims   - Will consider repeat physical therapy session
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
## 2022-10-20 ENCOUNTER — TELEPHONE (OUTPATIENT)
Dept: NEUROLOGY | Facility: CLINIC | Age: 27
End: 2022-10-20

## 2022-10-28 ENCOUNTER — PATIENT OUTREACH (OUTPATIENT)
Dept: FAMILY MEDICINE CLINIC | Facility: CLINIC | Age: 27
End: 2022-10-28

## 2022-10-28 NOTE — PROGRESS NOTES
FRANCO ADRIAN did receive referral from provider regarding Pt needs assistance with applying for disability  After chart review FRANCO ADRIAN did place a call to Pt, introduced herself, her role, and explained Pt the purpose of this call  Pt stated that she applied for disability herself  FRANCO ADRIAN asked Pt if there is other social needs that she would like to share  Pt declined social needs at this time  FRANCO ADRIAN informed Pt that she can contact the FRANCO  for further support, and this referral will be closed due to she denied social needs at time  Pt seems understanding and thankful for the FRANCO  assistance  FRANCO ADRIAN will close this case due to Pt declined social needs at this time  FRANCO ADRIAN is remain available for further assistance as needed

## 2022-11-02 ENCOUNTER — HOSPITAL ENCOUNTER (EMERGENCY)
Facility: HOSPITAL | Age: 27
Discharge: HOME | End: 2022-11-02
Attending: EMERGENCY MEDICINE

## 2022-11-02 ENCOUNTER — APPOINTMENT (EMERGENCY)
Dept: RADIOLOGY | Facility: HOSPITAL | Age: 27
End: 2022-11-02

## 2022-11-02 VITALS
DIASTOLIC BLOOD PRESSURE: 62 MMHG | HEART RATE: 75 BPM | OXYGEN SATURATION: 99 % | SYSTOLIC BLOOD PRESSURE: 110 MMHG | TEMPERATURE: 98 F | RESPIRATION RATE: 18 BRPM

## 2022-11-02 DIAGNOSIS — M25.559 HIP PAIN: Primary | ICD-10-CM

## 2022-11-02 DIAGNOSIS — M54.50 LOWER BACK PAIN: ICD-10-CM

## 2022-11-02 RX ORDER — KETOROLAC TROMETHAMINE 30 MG/ML
15 INJECTION, SOLUTION INTRAMUSCULAR; INTRAVENOUS ONCE
Status: COMPLETED | OUTPATIENT
Start: 2022-11-02 | End: 2022-11-02

## 2022-11-02 RX ORDER — ACETAMINOPHEN 325 MG/1
975 TABLET ORAL ONCE
Status: COMPLETED | OUTPATIENT
Start: 2022-11-02 | End: 2022-11-02

## 2022-11-02 RX ORDER — METHOCARBAMOL 500 MG/1
500 TABLET, FILM COATED ORAL ONCE
Status: COMPLETED | OUTPATIENT
Start: 2022-11-02 | End: 2022-11-02

## 2022-11-02 RX ORDER — METHOCARBAMOL 500 MG/1
500 TABLET, FILM COATED ORAL 2 TIMES DAILY
Qty: 20 TABLET | Refills: 0 | Status: SHIPPED | OUTPATIENT
Start: 2022-11-02

## 2022-11-02 RX ORDER — LIDOCAINE 50 MG/G
1 PATCH TOPICAL ONCE
Status: DISCONTINUED | OUTPATIENT
Start: 2022-11-02 | End: 2022-11-02 | Stop reason: HOSPADM

## 2022-11-02 RX ADMIN — METHOCARBAMOL 500 MG: 500 TABLET ORAL at 12:08

## 2022-11-02 RX ADMIN — KETOROLAC TROMETHAMINE 15 MG: 30 INJECTION, SOLUTION INTRAMUSCULAR; INTRAVENOUS at 12:07

## 2022-11-02 RX ADMIN — LIDOCAINE 5% 1 PATCH: 700 PATCH TOPICAL at 12:08

## 2022-11-02 RX ADMIN — ACETAMINOPHEN 975 MG: 325 TABLET ORAL at 12:08

## 2022-11-02 NOTE — ED PROVIDER NOTES
History  Chief Complaint   Patient presents with   • Hip Pain     Pt c/o r sided hip pain worsening with walking since last Sunday, pt denies any known injury       HPI    Prior to Admission Medications   Prescriptions Last Dose Informant Patient Reported? Taking? Myfembree 40-1-0 5 MG TABS   Yes No   Sig: Take 1 tablet by mouth daily   albuterol (PROVENTIL HFA,VENTOLIN HFA) 90 mcg/act inhaler   No No   Sig: inhale 2 puffs by mouth and INTO THE LUNGS every 4 hours if needed for wheezing   amitriptyline (ELAVIL) 25 mg tablet   No No   Sig: Take 2 tablets (50 mg total) by mouth daily at bedtime   cyclobenzaprine (FLEXERIL) 5 mg tablet   No No   Sig: Take 1 tablet (5 mg total) by mouth 3 (three) times a day as needed for muscle spasms   loratadine (CLARITIN) 10 mg tablet   No No   Sig: Take 1 tablet (10 mg total) by mouth daily   naproxen (Naprosyn) 500 mg tablet   No No   Sig: Take 1 tablet (500 mg total) by mouth 2 (two) times a day as needed for mild pain   oxybutynin (DITROPAN XL) 15 MG 24 hr tablet   Yes No   Sig: Take 15 mg by mouth daily      Facility-Administered Medications: None       Past Medical History:   Diagnosis Date   • Anemia    • Asthma    • Fibroids    • Heart murmur        History reviewed  No pertinent surgical history  History reviewed  No pertinent family history  I have reviewed and agree with the history as documented      E-Cigarette/Vaping   • E-Cigarette Use Never User      E-Cigarette/Vaping Substances   • Nicotine No    • THC No    • CBD No    • Flavoring No    • Other No    • Unknown No      Social History     Tobacco Use   • Smoking status: Never Smoker   • Smokeless tobacco: Never Used   Vaping Use   • Vaping Use: Never used   Substance Use Topics   • Alcohol use: Yes     Comment: rarely; about 3x a year   • Drug use: Never        Review of Systems    Physical Exam  ED Triage Vitals   Temperature Pulse Respirations Blood Pressure SpO2   11/02/22 0931 11/02/22 0931 11/02/22 0931 11/02/22 0931 11/02/22 0931   98 °F (36 7 °C) 75 18 110/62 99 %      Temp Source Heart Rate Source Patient Position - Orthostatic VS BP Location FiO2 (%)   11/02/22 0931 11/02/22 0931 11/02/22 0931 11/02/22 0931 --   Oral Monitor Sitting Left arm       Pain Score       11/02/22 1207       9             Orthostatic Vital Signs  Vitals:    11/02/22 0931   BP: 110/62   Pulse: 75   Patient Position - Orthostatic VS: Sitting       Physical Exam    ED Medications  Medications   lidocaine (LIDODERM) 5 % patch 1 patch (1 patch Topical Medication Applied 11/2/22 1208)   ketorolac (TORADOL) injection 15 mg (15 mg Intramuscular Given 11/2/22 1207)   methocarbamol (ROBAXIN) tablet 500 mg (500 mg Oral Given 11/2/22 1208)   acetaminophen (TYLENOL) tablet 975 mg (975 mg Oral Given 11/2/22 1208)       Diagnostic Studies  Results Reviewed     None                 XR lumbar spine 2 or 3 views   Final Result by Mirian Delgado MD (11/02 1101)      Normal examination  Workstation performed: [id number]         XR hip/pelv 2-3 vws right   Final Result by Mirian Delgado MD (11/02 1100)      No acute osseous abnormality  Workstation performed: [id number]               Procedures  Procedures      ED Course                                       MDM    Disposition  Final diagnoses:   Hip pain   Lower back pain     Time reflects when diagnosis was documented in both MDM as applicable and the Disposition within this note     Time User Action Codes Description Comment    11/2/2022  1:00 PM Dayan Smith Add [M25 559] Hip pain     11/2/2022  1:00 PM Dayan Smith Add [M54 50] Lower back pain       ED Disposition     ED Disposition   Discharge    Condition   Stable    Date/Time   Wed Nov 2, 2022  1:00 PM    [address] [facility] discharge to home/self care                 Follow-up Information     Follow up With Specialties Details Why Contact Info Additional Information    Belia Buerger, DO Family Medicine Call  As needed [address] Emergency Department Emergency Medicine Go to  If symptoms worsen Susan 10 [phone]  [facility] [address] [facility], [address], [address], [place], [address] [facility]    [address] [facility] Physical Therapy Call  As needed [phone]          Patient's Medications   Discharge Prescriptions    METHOCARBAMOL (ROBAXIN) 500 MG TABLET    Take 1 tablet (500 mg total) by mouth 2 (two) times a day       Start Date: 11/2/2022 End Date: --       Order Dose: 500 mg       Quantity: 20 tablet    Refills: 0         PDMP Review     None           ED Provider  Attending physically available and evaluated Pepe Weeks I managed the patient along with the ED Attending      Electronically Signed by (11/02 1101)      Normal examination  Workstation performed: [id number]         XR hip/pelv 2-3 vws right   Final Result by Torey Marrero MD (11/02 1100)      No acute osseous abnormality  Workstation performed: [id number]               Procedures  Procedures      ED Course                                       MDM  Number of Diagnoses or Management Options  Hip pain  Lower back pain  Diagnosis management comments: This is a 33 y/o female presenting for evaluation of right sided hip pain and right lower back pain that started 10 days ago  Her symptoms are worsened with walking  She reports no recent injuries, falls, or direct trauma to the right side of her back or her hip  Mild tenderness to palpation over the right hip and right SI joint as well as paraspinal lumbar muscles on the right side  No obvious deformity  Normal range of motion  No midline CTLS spine tenderness to palpation, no step-offs  Neurovascularly intact  Will evaluate with x-rays of the lumbar spine and right hip, symptomatic management  Symptoms moderately improved with treatment  No acute abnormality on x-rays  Follow-up with orthopedics/PT/comprehensive spine  Discussed all findings, treatment, red flags/return precautions, and outpatient follow-up and the patient/family understands and agrees  Stable for discharge  Disposition  Final diagnoses:   Hip pain   Lower back pain     Time reflects when diagnosis was documented in both MDM as applicable and the Disposition within this note     Time User Action Codes Description Comment    11/2/2022  1:00 PM Orlando Rousseau Add [M25 559] Hip pain     11/2/2022  1:00 PM Orlando Rousseau Add [M54 50] Lower back pain       ED Disposition     ED Disposition   Discharge    Condition   Stable    Date/Time   Wed Nov 2, 2022  1:00 PM    [address] [facility] discharge to home/self care                 Follow-up Information     Follow up With Specialties Details Why Contact Info Additional Information    Sharad Navarro DO Family Medicine Call  As needed [address] [facility] [place]  [address]        [address] [facility] Emergency Department Emergency Medicine Go to  If symptoms worsen [address] 10 [phone]  [address] [facility] [address] Emergency Department, [address], [place], [place], [address] [facility]    [address] [facility] Physical Therapy Call  As needed [phone]          Patient's Medications   Discharge Prescriptions    METHOCARBAMOL (ROBAXIN) 500 MG TABLET    Take 1 tablet (500 mg total) by mouth 2 (two) times a day       Start Date: 11/2/2022 End Date: --       Order Dose: 500 mg       Quantity: 20 tablet    Refills: 0         PDMP Review     None           ED Provider  Attending physically available and evaluated Yola Glover I managed the patient along with the ED Attending      Electronically Signed by         Milly Alvarez MD  11/09/22 1111

## 2022-11-03 NOTE — ED ATTENDING ATTESTATION
11/2/2022  IKylah MD, saw and evaluated the patient  I have discussed the patient with the resident/non-physician practitioner and agree with the resident's/non-physician practitioner's findings, Plan of Care, and MDM as documented in the resident's/non-physician practitioner's note, except where noted  All available labs and Radiology studies were reviewed  I was present for key portions of any procedure(s) performed by the resident/non-physician practitioner and I was immediately available to provide assistance  At this point I agree with the current assessment done in the Emergency Department  I have conducted an independent evaluation of this patient a history and physical is as follows:    ED Course     Patient presents for evaluation due to R sided hip pain for several days       Critical Care Time  Procedures

## 2022-11-04 ENCOUNTER — OFFICE VISIT (OUTPATIENT)
Dept: DENTISTRY | Facility: CLINIC | Age: 27
End: 2022-11-04

## 2022-11-04 VITALS — DIASTOLIC BLOOD PRESSURE: 69 MMHG | TEMPERATURE: 97.8 F | HEART RATE: 81 BPM | SYSTOLIC BLOOD PRESSURE: 115 MMHG

## 2022-11-04 DIAGNOSIS — M26.4 MALOCCLUSION: Primary | ICD-10-CM
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
## 2022-11-07 ENCOUNTER — OFFICE VISIT (OUTPATIENT)
Dept: NEUROLOGY | Facility: CLINIC | Age: 27
End: 2022-11-07

## 2022-11-07 VITALS
DIASTOLIC BLOOD PRESSURE: 58 MMHG | HEART RATE: 91 BPM | RESPIRATION RATE: 18 BRPM | TEMPERATURE: 97.6 F | SYSTOLIC BLOOD PRESSURE: 117 MMHG | WEIGHT: 215 LBS | HEIGHT: 67 IN | BODY MASS INDEX: 33.74 KG/M2

## 2022-11-07 DIAGNOSIS — R29.898 WEAKNESS OF LEFT UPPER EXTREMITY: ICD-10-CM

## 2022-11-07 DIAGNOSIS — G43.709 CHRONIC MIGRAINE WITHOUT AURA WITHOUT STATUS MIGRAINOSUS, NOT INTRACTABLE: Primary | ICD-10-CM

## 2022-11-07 RX ORDER — QUETIAPINE FUMARATE 100 MG/1
100 TABLET, FILM COATED ORAL
COMMUNITY

## 2022-11-07 RX ORDER — PENTOSAN POLYSULFATE SODIUM 100 MG/1
CAPSULE, GELATIN COATED ORAL
COMMUNITY

## 2022-11-07 RX ORDER — HYDROXYZINE HYDROCHLORIDE 10 MG/1
TABLET, FILM COATED ORAL
COMMUNITY
Start: 2022-10-25

## 2022-11-07 RX ORDER — TOPIRAMATE 25 MG/1
TABLET ORAL
Qty: 60 TABLET | Refills: 5 | Status: SHIPPED | OUTPATIENT
Start: 2022-11-07

## 2022-11-07 RX ORDER — RELUGOLIX, ESTRADIOL HEMIHYDRATE, AND NORETHINDRONE ACETATE 40; 1; .5 MG/1; MG/1; MG/1
TABLET, FILM COATED ORAL
COMMUNITY

## 2022-11-07 NOTE — PATIENT INSTRUCTIONS
Start Topamax 25mg at bedtime x 2 weeks, then increase to 2 at bedtime (50mg)  Continue to follow with psychiatry for mood management  Make sure you are staying well hydrated, eating regularly, and getting adequate sleep  Follow up in 3 months
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
## 2022-11-07 NOTE — ASSESSMENT & PLAN NOTE
Patient previously on amitriptyline 50mg HS for headaches and sleep, but stopped taking early 2022 due to running out (she never requested a refill from us)  Migraines increased in frequency after stopping the medication  At timing of last visit she reported having an upcoming intake with a psychiatrist and anticipated they would start her on something for anxiety and depression  We decided to hold off on restarting amitriptyline until we knew what psych was prescribing  She is currently taking Seroquel and hydroxyzine  She is not sure how much this is assisting with mood management  Encouraged her to discuss with her psychiatrist     We decided to try a different migraine preventative  Will trial Topamax  Side effects discussed  No history of kidney stones  Plan:   For migraine preventative therapy-  -start Topamax 25mg HS x 2 weeks, then increase to 50mg HS  -discussed maintaining adequate hydration, eating regularly/not skipping meals and getting plenty of sleep     For migraine abortive therapy-  -limit the use of any OTC analgesics to no more than 2-3 times per week to prevent rebound headache
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
## 2022-11-09 ENCOUNTER — OFFICE VISIT (OUTPATIENT)
Dept: FAMILY MEDICINE CLINIC | Facility: CLINIC | Age: 27
End: 2022-11-09

## 2022-11-09 VITALS
SYSTOLIC BLOOD PRESSURE: 122 MMHG | BODY MASS INDEX: 34.07 KG/M2 | RESPIRATION RATE: 18 BRPM | DIASTOLIC BLOOD PRESSURE: 66 MMHG | WEIGHT: 217.1 LBS | OXYGEN SATURATION: 99 % | TEMPERATURE: 97.8 F | HEIGHT: 67 IN | HEART RATE: 81 BPM

## 2022-11-09 DIAGNOSIS — F32.1 CURRENT MODERATE EPISODE OF MAJOR DEPRESSIVE DISORDER WITHOUT PRIOR EPISODE (HCC): Primary | ICD-10-CM

## 2022-11-09 DIAGNOSIS — S09.90XS HEAD, FACE & NECK INJURY, SEQUELA: ICD-10-CM

## 2022-11-09 DIAGNOSIS — S09.90XS HEAD, FACE & NECK INJURY, SEQUELA: Primary | ICD-10-CM

## 2022-11-09 DIAGNOSIS — S09.93XS HEAD, FACE & NECK INJURY, SEQUELA: Primary | ICD-10-CM

## 2022-11-09 DIAGNOSIS — S09.93XS HEAD, FACE & NECK INJURY, SEQUELA: ICD-10-CM

## 2022-11-09 DIAGNOSIS — S19.9XXS HEAD, FACE & NECK INJURY, SEQUELA: ICD-10-CM

## 2022-11-09 DIAGNOSIS — S19.9XXS HEAD, FACE & NECK INJURY, SEQUELA: Primary | ICD-10-CM

## 2022-11-09 RX ORDER — ACETAMINOPHEN 500 MG
1000 TABLET ORAL EVERY 8 HOURS SCHEDULED
Qty: 180 TABLET | Refills: 2 | Status: SHIPPED | OUTPATIENT
Start: 2022-11-09 | End: 2023-02-07

## 2022-11-09 RX ORDER — IBUPROFEN 600 MG/1
600 TABLET ORAL EVERY 8 HOURS SCHEDULED
Qty: 90 TABLET | Refills: 2 | Status: SHIPPED | OUTPATIENT
Start: 2022-11-09 | End: 2023-02-07
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
## 2022-11-09 NOTE — ASSESSMENT & PLAN NOTE
Patient continues to have depression, she also reports insomnia  Previous PHQ 9 = 18 current PHQ 9 = 17    PLAN:   - continue with medication Atarax and Seroquel for sleep   - patient is getting established with a psychiatrist, therefore will hold off on starting any antidepressant at this time  However strongly recommend an antidepressant, patient is agreeable to starting medication at this time    - Gave patient strict ED precautions if patient has any suicidal ideations or intentions
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
## 2022-11-28 ENCOUNTER — OFFICE VISIT (OUTPATIENT)
Dept: AUDIOLOGY | Age: 27
End: 2022-11-28

## 2022-11-28 DIAGNOSIS — H90.3 SENSORY HEARING LOSS, BILATERAL: Primary | ICD-10-CM

## 2022-11-28 NOTE — PROGRESS NOTES
Hearing Aid Visit:    Name:  Ministerio Montano  :  1995  Age:  32 y o  Date of Evaluation: 22     Ministerio Montano is being seen for a hearing aid visit  Shanel Childress is fit with [employer] Mini [employer] hearing aid(s)  Left serial [id number]  Warranty date: 24 (Loss/Damage and repair)    Dome/Earmold: 10DB       Patient reports a new alarm at work is too loud  Action:  Decreased MPO  Dispensed a pack of domes  Recommendations: Follow up PRN        Estefania Marsh , CCC-A  Clinical Audiologist

## 2022-12-06 ENCOUNTER — OFFICE VISIT (OUTPATIENT)
Dept: AUDIOLOGY | Age: 27
End: 2022-12-06

## 2022-12-06 DIAGNOSIS — H90.3 SENSORY HEARING LOSS, BILATERAL: Primary | ICD-10-CM

## 2022-12-06 NOTE — PROGRESS NOTES
Hearing Aid Visit:    Name:  Kailee Rodriguez  :  1995  Age:  32 y o  Date of Evaluation: 22     Kailee Rodriguez is being seen for a hearing aid visit  Kailee Rodriguez is fit with [employer] Mini [employer] hearing aid(s)  Left serial OOPPEQ [id number]  Warranty date: 24 (Loss/Damage and repair)    Dome/Earmold: 10DB       Patient reports she may be moving to [facility] and had questions about continued care  Action:  Told patient to reach out to local Audiology clinics or [facility] to find [facility] participating [facility] in the area  Recommendations: Follow up Estefania Gurrola , KELVIN-A  Clinical Audiologist

## 2022-12-09 ENCOUNTER — OFFICE VISIT (OUTPATIENT)
Dept: FAMILY MEDICINE CLINIC | Facility: CLINIC | Age: 27
End: 2022-12-09

## 2022-12-09 VITALS
BODY MASS INDEX: 34.37 KG/M2 | OXYGEN SATURATION: 98 % | HEIGHT: 67 IN | SYSTOLIC BLOOD PRESSURE: 118 MMHG | WEIGHT: 219 LBS | DIASTOLIC BLOOD PRESSURE: 74 MMHG | RESPIRATION RATE: 18 BRPM | TEMPERATURE: 97.8 F | HEART RATE: 74 BPM

## 2022-12-09 DIAGNOSIS — S19.9XXS HEAD, FACE & NECK INJURY, SEQUELA: ICD-10-CM

## 2022-12-09 DIAGNOSIS — F32.1 CURRENT MODERATE EPISODE OF MAJOR DEPRESSIVE DISORDER WITHOUT PRIOR EPISODE (HCC): Primary | ICD-10-CM

## 2022-12-09 DIAGNOSIS — S09.93XS HEAD, FACE & NECK INJURY, SEQUELA: ICD-10-CM

## 2022-12-09 DIAGNOSIS — S09.90XS HEAD, FACE & NECK INJURY, SEQUELA: ICD-10-CM

## 2022-12-09 RX ORDER — GABAPENTIN 100 MG/1
100 CAPSULE ORAL
Qty: 30 CAPSULE | Refills: 2 | Status: SHIPPED | OUTPATIENT
Start: 2022-12-09 | End: 2023-03-09
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
## 2022-12-09 NOTE — ASSESSMENT & PLAN NOTE
Patient continues to have depression, her insomnia has improved  She is sleeping 6-8 hrs daily   Patient does work overnights     PLAN:   - continue with medication Atarax and Seroquel for sleep   - Gave patient strict ED precautions if patient has any suicidal ideations or intentions
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
## 2022-12-20 ENCOUNTER — TELEPHONE (OUTPATIENT)
Dept: PSYCHIATRY | Facility: CLINIC | Age: 27
End: 2022-12-20

## 2022-12-20 NOTE — TELEPHONE ENCOUNTER
Pt called and left a voicemail about trying to reach [employer]   She is requesting a return call at [phone]

## 2023-01-24 ENCOUNTER — TELEPHONE (OUTPATIENT)
Dept: NEUROLOGY | Facility: CLINIC | Age: 28
End: 2023-01-24

## 2023-01-24 NOTE — TELEPHONE ENCOUNTER
Called and spoke with pt to confirm upcoming appt on 02/07/23  Pt requested to cancel appt stated she moved

## 2023-01-25 ENCOUNTER — OFFICE VISIT (OUTPATIENT)
Dept: FAMILY MEDICINE CLINIC | Facility: CLINIC | Age: 28
End: 2023-01-25

## 2023-01-25 VITALS
TEMPERATURE: 98.9 F | BODY MASS INDEX: 33.27 KG/M2 | HEART RATE: 76 BPM | SYSTOLIC BLOOD PRESSURE: 126 MMHG | RESPIRATION RATE: 18 BRPM | WEIGHT: 212 LBS | HEIGHT: 67 IN | DIASTOLIC BLOOD PRESSURE: 72 MMHG | OXYGEN SATURATION: 98 %

## 2023-01-25 DIAGNOSIS — J45.20 MILD INTERMITTENT ASTHMA WITHOUT COMPLICATION: ICD-10-CM

## 2023-01-25 DIAGNOSIS — F32.1 CURRENT MODERATE EPISODE OF MAJOR DEPRESSIVE DISORDER WITHOUT PRIOR EPISODE (HCC): Primary | ICD-10-CM

## 2023-01-25 DIAGNOSIS — M54.50 LOWER BACK PAIN: ICD-10-CM

## 2023-01-25 DIAGNOSIS — S09.93XS HEAD, FACE & NECK INJURY, SEQUELA: ICD-10-CM

## 2023-01-25 DIAGNOSIS — S09.90XS HEAD, FACE & NECK INJURY, SEQUELA: ICD-10-CM

## 2023-01-25 DIAGNOSIS — S19.9XXS HEAD, FACE & NECK INJURY, SEQUELA: ICD-10-CM

## 2023-01-25 RX ORDER — GABAPENTIN 100 MG/1
100 CAPSULE ORAL 2 TIMES DAILY
Qty: 180 CAPSULE | Refills: 0 | Status: SHIPPED | OUTPATIENT
Start: 2023-01-25 | End: 2023-04-25

## 2023-01-25 RX ORDER — QUETIAPINE FUMARATE 100 MG/1
100 TABLET, FILM COATED ORAL
Qty: 90 TABLET | Refills: 2 | Status: SHIPPED | OUTPATIENT
Start: 2023-01-25

## 2023-01-25 RX ORDER — METHOCARBAMOL 500 MG/1
500 TABLET, FILM COATED ORAL 2 TIMES DAILY
Qty: 20 TABLET | Refills: 0 | Status: SHIPPED | OUTPATIENT
Start: 2023-01-25

## 2023-01-25 RX ORDER — IBUPROFEN 600 MG/1
600 TABLET ORAL EVERY 8 HOURS SCHEDULED
Qty: 90 TABLET | Refills: 2 | Status: SHIPPED | OUTPATIENT
Start: 2023-01-25 | End: 2023-04-25

## 2023-01-25 RX ORDER — ALBUTEROL SULFATE 90 UG/1
2 AEROSOL, METERED RESPIRATORY (INHALATION) EVERY 4 HOURS PRN
Qty: 54 G | Refills: 2 | Status: SHIPPED | OUTPATIENT
Start: 2023-01-25

## 2023-01-25 RX ORDER — HYDROXYZINE HYDROCHLORIDE 10 MG/1
10 TABLET, FILM COATED ORAL EVERY 6 HOURS PRN
Qty: 30 TABLET | Refills: 0 | Status: SHIPPED | OUTPATIENT
Start: 2023-01-25 | End: 2023-02-24

## 2023-01-25 NOTE — ASSESSMENT & PLAN NOTE
Patient continues to have depression however her mood has greatly improved, her insomnia has improved  She is sleeping 6-8 hrs daily  Patient does work overnights     Had previously been on Seroquel for sleep and had great improvement       PLAN:   - continue with medication Atarax and Seroquel for sleep   - Gave patient strict ED precautions if patient has any suicidal ideations or intentions   - patient is established with therapist
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
## 2023-02-13 ENCOUNTER — TELEPHONE (OUTPATIENT)
Dept: FAMILY MEDICINE CLINIC | Facility: CLINIC | Age: 28
End: 2023-02-13

## 2023-02-13 NOTE — TELEPHONE ENCOUNTER
Hi, this is Gely Hernández and I'm calling from [facility] legal services  This call is in regards to a request which was sent to your location for a patient's billing from sent medical records  The patient's name is Jaleel Rico  I'll spread that out for you  The first name is Kylah Pascal and the last name is Ruba Cuevas and the YOB: 1995  And we had sent this request recently to you, a subpoena, and it was delivered on February 2nd and we wanted to get an update on it if you have received it and if you are working on the request which has been sent to you  And I would request you to please give me a call back at the earliest of your convenience at [phone]  And the extension is [phone] for the queries  You can always give me a call back or else you can leave me, leave me an e-mail and I'll give you a call back  My e-mail is [email or web address] at [facility] legal [facility], which is [email or web address]  And yes, you can always give me a call back on that  Thank you  Have a good day

## 2023-02-16 NOTE — TELEPHONE ENCOUNTER
I have not received request in regards pt  I contacted the phone number christel provided and I left a message for a return call

## 2023-02-24 ENCOUNTER — TELEPHONE (OUTPATIENT)
Dept: FAMILY MEDICINE CLINIC | Facility: CLINIC | Age: 28
End: 2023-02-24

## 2023-02-24 NOTE — TELEPHONE ENCOUNTER
Subpoena received request of the records has been faxed to [facility] on 02/24/23, confirmation received  Scanned to chart

## 2023-03-28 ENCOUNTER — TELEPHONE (OUTPATIENT)
Dept: FAMILY MEDICINE CLINIC | Facility: CLINIC | Age: 28
End: 2023-03-28

## 2023-03-28 NOTE — TELEPHONE ENCOUNTER
Record request from [facility] received 3/28/23  Faxed to [phone]  Receipt received

## 2024-02-06 ENCOUNTER — TELEPHONE (OUTPATIENT)
Dept: FAMILY MEDICINE CLINIC | Facility: CLINIC | Age: 29
End: 2024-02-06

## 2024-02-06 NOTE — TELEPHONE ENCOUNTER
Subpoena received request of the records has been faxed to [facility] on 02/06/24, confirmation received. Scanned to chart.

## 2024-02-21 PROBLEM — H66.004 RECURRENT ACUTE SUPPURATIVE OTITIS MEDIA OF RIGHT EAR WITHOUT SPONTANEOUS RUPTURE OF TYMPANIC MEMBRANE: Status: RESOLVED | Noted: 2019-10-25 | Resolved: 2024-02-21
